# Patient Record
Sex: MALE | Race: WHITE | Employment: UNEMPLOYED | URBAN - METROPOLITAN AREA
[De-identification: names, ages, dates, MRNs, and addresses within clinical notes are randomized per-mention and may not be internally consistent; named-entity substitution may affect disease eponyms.]

---

## 2017-03-08 ENCOUNTER — HOSPITAL ENCOUNTER (EMERGENCY)
Age: 82
Discharge: HOME | End: 2017-03-08
Attending: EMERGENCY MEDICINE
Payer: MEDICARE

## 2017-03-08 ENCOUNTER — APPOINTMENT (OUTPATIENT)
Dept: GENERAL RADIOLOGY | Age: 82
End: 2017-03-08
Attending: EMERGENCY MEDICINE
Payer: MEDICARE

## 2017-03-08 VITALS
OXYGEN SATURATION: 99 % | RESPIRATION RATE: 17 BRPM | HEIGHT: 64 IN | TEMPERATURE: 97.9 F | HEART RATE: 75 BPM | DIASTOLIC BLOOD PRESSURE: 73 MMHG | WEIGHT: 145 LBS | SYSTOLIC BLOOD PRESSURE: 184 MMHG | BODY MASS INDEX: 24.75 KG/M2

## 2017-03-08 DIAGNOSIS — R53.1 WEAKNESS: Primary | ICD-10-CM

## 2017-03-08 LAB
ALBUMIN SERPL BCP-MCNC: 3.3 G/DL (ref 3.2–4.6)
ALBUMIN/GLOB SERPL: 0.8 {RATIO} (ref 1.2–3.5)
ALP SERPL-CCNC: 83 U/L (ref 50–136)
ALT SERPL-CCNC: 17 U/L (ref 12–65)
ANION GAP BLD CALC-SCNC: 10 MMOL/L (ref 7–16)
APTT PPP: 27.3 SEC (ref 23.5–31.7)
AST SERPL W P-5'-P-CCNC: 16 U/L (ref 15–37)
BASOPHILS # BLD AUTO: 0 K/UL (ref 0–0.2)
BASOPHILS # BLD: 1 % (ref 0–2)
BILIRUB SERPL-MCNC: 0.3 MG/DL (ref 0.2–1.1)
BUN SERPL-MCNC: 30 MG/DL (ref 8–23)
CALCIUM SERPL-MCNC: 8.5 MG/DL (ref 8.3–10.4)
CHLORIDE SERPL-SCNC: 105 MMOL/L (ref 98–107)
CO2 SERPL-SCNC: 27 MMOL/L (ref 21–32)
CREAT SERPL-MCNC: 1.78 MG/DL (ref 0.8–1.5)
DIFFERENTIAL METHOD BLD: ABNORMAL
EOSINOPHIL # BLD: 0.3 K/UL (ref 0–0.8)
EOSINOPHIL NFR BLD: 4 % (ref 0.5–7.8)
ERYTHROCYTE [DISTWIDTH] IN BLOOD BY AUTOMATED COUNT: 14.1 % (ref 11.9–14.6)
GLOBULIN SER CALC-MCNC: 3.9 G/DL (ref 2.3–3.5)
GLUCOSE SERPL-MCNC: 96 MG/DL (ref 65–100)
HCT VFR BLD AUTO: 40.9 % (ref 41.1–50.3)
HGB BLD-MCNC: 14.1 G/DL (ref 13.6–17.2)
IMM GRANULOCYTES # BLD: 0 K/UL (ref 0–0.5)
IMM GRANULOCYTES NFR BLD AUTO: 0.2 % (ref 0–5)
INR PPP: 1 (ref 0.9–1.2)
LYMPHOCYTES # BLD AUTO: 23 % (ref 13–44)
LYMPHOCYTES # BLD: 1.9 K/UL (ref 0.5–4.6)
MAGNESIUM SERPL-MCNC: 2 MG/DL (ref 1.8–2.4)
MCH RBC QN AUTO: 31.8 PG (ref 26.1–32.9)
MCHC RBC AUTO-ENTMCNC: 34.5 G/DL (ref 31.4–35)
MCV RBC AUTO: 92.3 FL (ref 79.6–97.8)
MONOCYTES # BLD: 0.8 K/UL (ref 0.1–1.3)
MONOCYTES NFR BLD AUTO: 9 % (ref 4–12)
NEUTS SEG # BLD: 5 K/UL (ref 1.7–8.2)
NEUTS SEG NFR BLD AUTO: 63 % (ref 43–78)
PLATELET # BLD AUTO: 172 K/UL (ref 150–450)
PMV BLD AUTO: 10.7 FL (ref 10.8–14.1)
POTASSIUM SERPL-SCNC: 3.9 MMOL/L (ref 3.5–5.1)
PROT SERPL-MCNC: 7.2 G/DL (ref 6.3–8.2)
PROTHROMBIN TIME: 10.6 SEC (ref 9.6–12)
RBC # BLD AUTO: 4.43 M/UL (ref 4.23–5.67)
SODIUM SERPL-SCNC: 142 MMOL/L (ref 136–145)
TROPONIN I SERPL-MCNC: 0.02 NG/ML (ref 0.02–0.05)
WBC # BLD AUTO: 8 K/UL (ref 4.3–11.1)

## 2017-03-08 PROCEDURE — 85025 COMPLETE CBC W/AUTO DIFF WBC: CPT | Performed by: EMERGENCY MEDICINE

## 2017-03-08 PROCEDURE — 99285 EMERGENCY DEPT VISIT HI MDM: CPT | Performed by: EMERGENCY MEDICINE

## 2017-03-08 PROCEDURE — 85610 PROTHROMBIN TIME: CPT | Performed by: EMERGENCY MEDICINE

## 2017-03-08 PROCEDURE — 85730 THROMBOPLASTIN TIME PARTIAL: CPT | Performed by: EMERGENCY MEDICINE

## 2017-03-08 PROCEDURE — 81003 URINALYSIS AUTO W/O SCOPE: CPT | Performed by: EMERGENCY MEDICINE

## 2017-03-08 PROCEDURE — 71020 XR CHEST PA LAT: CPT

## 2017-03-08 PROCEDURE — 84484 ASSAY OF TROPONIN QUANT: CPT | Performed by: EMERGENCY MEDICINE

## 2017-03-08 PROCEDURE — 93005 ELECTROCARDIOGRAM TRACING: CPT | Performed by: EMERGENCY MEDICINE

## 2017-03-08 PROCEDURE — 96360 HYDRATION IV INFUSION INIT: CPT | Performed by: EMERGENCY MEDICINE

## 2017-03-08 PROCEDURE — 82271 OCCULT BLOOD OTHER SOURCES: CPT

## 2017-03-08 PROCEDURE — 83735 ASSAY OF MAGNESIUM: CPT | Performed by: EMERGENCY MEDICINE

## 2017-03-08 PROCEDURE — 80053 COMPREHEN METABOLIC PANEL: CPT | Performed by: EMERGENCY MEDICINE

## 2017-03-08 PROCEDURE — 74011250636 HC RX REV CODE- 250/636: Performed by: EMERGENCY MEDICINE

## 2017-03-08 RX ADMIN — SODIUM CHLORIDE 500 ML: 900 INJECTION, SOLUTION INTRAVENOUS at 18:30

## 2017-03-08 NOTE — ED PROVIDER NOTES
HPI Comments: 81 yo male with a history of CAD s/p CABG, HTN, and anemia on iron presents to ED with black stool that began this morning. Patient also with over 2 weeks of generalized fatigue/weakness. He has been seen at [facility] ED and by PMD for same. Patient denies focal weakness or numbness. No headache. No dizziness. No fever/chills. No chest pain, shortness of breath, or syncope. No abdominal pain or nausea/vomiting. Patient is a 80 y.o. male presenting with anal bleeding. The history is provided by the patient and a relative. Rectal Bleeding    Pertinent negatives include no abdominal pain, no dysuria, no chills, no fever, no nausea, no vomiting and no diarrhea. Past Medical History:   Diagnosis Date    CAD (coronary artery disease)        Past Surgical History:   Procedure Laterality Date    CARDIAC SURG PROCEDURE UNLIST           History reviewed. No pertinent family history. Social History     Social History    Marital status:      Spouse name: N/A    Number of children: N/A    Years of education: N/A     Occupational History    Not on file. Social History Main Topics    Smoking status: Current Every Day Smoker    Smokeless tobacco: Not on file    Alcohol use Not on file    Drug use: Not on file    Sexual activity: Not on file     Other Topics Concern    Not on file     Social History Narrative    No narrative on file         ALLERGIES: Review of patient's allergies indicates no known allergies. Review of Systems   Constitutional: Positive for fatigue. Negative for chills and fever. HENT: Negative for congestion, rhinorrhea and sore throat. Eyes: Negative for pain and discharge. Respiratory: Negative for chest tightness and shortness of breath. Cardiovascular: Negative for chest pain, palpitations and leg swelling. Gastrointestinal: Negative for abdominal pain, diarrhea, nausea and vomiting.    Endocrine: Negative for cold intolerance and heat intolerance. Genitourinary: Negative for dysuria, flank pain and hematuria. Musculoskeletal: Negative for arthralgias, joint swelling and neck stiffness. Skin: Negative for pallor and wound. Allergic/Immunologic: Negative for environmental allergies. Neurological: Positive for weakness. Negative for dizziness, syncope, speech difficulty, light-headedness and numbness. Hematological: Negative for adenopathy. Psychiatric/Behavioral: Negative for suicidal ideas. Vitals:    03/08/17 1715 03/08/17 1812   BP: 155/64 137/63   Pulse: 77    Resp: 18    Temp: 98 °F (36.7 °C)    SpO2: 96%    Weight: 65.8 kg (145 lb)    Height: 5' 4\" (1.626 m)             Physical Exam   Constitutional: He is oriented to person, place, and time. He appears well-developed and well-nourished. No distress. HENT:   Head: Normocephalic and atraumatic. Mouth/Throat: Oropharynx is clear and moist.   Eyes: Conjunctivae and EOM are normal.   Neck: Normal range of motion. Neck supple. Cardiovascular: Normal rate, regular rhythm and intact distal pulses. Pulmonary/Chest: Effort normal and breath sounds normal. No respiratory distress. Abdominal: Soft. He exhibits no distension. There is no tenderness. Genitourinary:   Genitourinary Comments: Dark stool, hemoccult negative   Musculoskeletal: Normal range of motion. He exhibits no edema or tenderness. Neurological: He is alert and oriented to person, place, and time. Skin: Skin is warm and dry. Psychiatric: He has a normal mood and affect. MDM  Number of Diagnoses or Management Options  Diagnosis management comments: CXR with no acute disease. Labs reviewed, creatinine of 1.78, no comparison, patient given IVF here in ED, otherwise labs unremarkable. Patient with ~ 2 weeks of generalized weakness, normal exam here in ED, no signs of GI bleeding, will d/c home, PMD follow up.     ED Course       Procedures

## 2017-03-08 NOTE — ED TRIAGE NOTES
Reports black stools since this AM. Is not on a blood thinner. Denies pain. Reports 2 weeks ago was at ED for shaking. States feeling weak and has had some confusion.

## 2017-03-09 LAB
ATRIAL RATE: 75 BPM
CALCULATED P AXIS, ECG09: 56 DEGREES
CALCULATED R AXIS, ECG10: 11 DEGREES
CALCULATED T AXIS, ECG11: 8 DEGREES
DIAGNOSIS, 93000: NORMAL
DIASTOLIC BP, ECG02: NORMAL MMHG
P-R INTERVAL, ECG05: 182 MS
Q-T INTERVAL, ECG07: 396 MS
QRS DURATION, ECG06: 82 MS
QTC CALCULATION (BEZET), ECG08: 442 MS
SYSTOLIC BP, ECG01: NORMAL MMHG
VENTRICULAR RATE, ECG03: 75 BPM

## 2017-03-09 NOTE — ED NOTES
I have reviewed discharge instructions with the patient. The patient verbalized understanding. Ambulatory with family at bedside.

## 2017-03-09 NOTE — DISCHARGE INSTRUCTIONS
Weakness: Care Instructions  Your Care Instructions  Weakness is a lack of physical or muscle strength. You may feel that you need to make extra effort to move your arms, legs, or other muscles. Generalized weakness means that you feel weak in most areas of your body. Another type of weakness may affect just one muscle or group of muscles. You may feel weak and tired after you have done too much activity, such as taking an extra-long hike. This is not a serious problem. It often goes away on its own. Feeling weak can also be caused by medical conditions like thyroid problems, depression, or a virus. Sometimes the cause can be serious. Your doctor may want to do more tests to try to find the cause of the weakness. The doctor has checked you carefully, but problems can develop later. If you notice any problems or new symptoms, get medical treatment right away. Follow-up care is a key part of your treatment and safety. Be sure to make and go to all appointments, and call your doctor if you are having problems. It's also a good idea to know your test results and keep a list of the medicines you take. How can you care for yourself at home? · Rest when you feel tired. · Be safe with medicines. If your doctor prescribed medicine, take it exactly as prescribed. Call your doctor if you think you are having a problem with your medicine. You will get more details on the specific medicines your doctor prescribes. · Do not skip meals. Eating a balanced diet may increase your energy level. · Get some physical activity every day, but do not get too tired. When should you call for help? Call your doctor now or seek immediate medical care if:  · You have new or worse weakness. · You are dizzy or lightheaded, or you feel like you may faint. Watch closely for changes in your health, and be sure to contact your doctor if:  · You do not get better as expected. Where can you learn more?   Go to http://phil.info/. Enter [id number] in the search box to learn more about \"Weakness: Care Instructions. \"  Current as of: May 27, 2016  Content Version: 11.1  © 7194-5640 [employer]. Care instructions adapted under license by [employer] (which disclaims liability or warranty for this information). If you have questions about a medical condition or this instruction, always ask your healthcare professional. [address] any warranty or liability for your use of this information.

## 2017-06-12 ENCOUNTER — APPOINTMENT (OUTPATIENT)
Dept: CT IMAGING | Age: 82
End: 2017-06-12
Attending: EMERGENCY MEDICINE
Payer: MEDICARE

## 2017-06-12 ENCOUNTER — APPOINTMENT (OUTPATIENT)
Dept: GENERAL RADIOLOGY | Age: 82
End: 2017-06-12
Attending: EMERGENCY MEDICINE
Payer: MEDICARE

## 2017-06-12 ENCOUNTER — HOSPITAL ENCOUNTER (EMERGENCY)
Age: 82
Discharge: HOME | End: 2017-06-12
Attending: EMERGENCY MEDICINE
Payer: MEDICARE

## 2017-06-12 VITALS
HEART RATE: 76 BPM | BODY MASS INDEX: 24.75 KG/M2 | TEMPERATURE: 98.1 F | OXYGEN SATURATION: 95 % | WEIGHT: 145 LBS | RESPIRATION RATE: 18 BRPM | SYSTOLIC BLOOD PRESSURE: 120 MMHG | HEIGHT: 64 IN | DIASTOLIC BLOOD PRESSURE: 50 MMHG

## 2017-06-12 DIAGNOSIS — F03.90 DEMENTIA WITHOUT BEHAVIORAL DISTURBANCE, UNSPECIFIED DEMENTIA TYPE: ICD-10-CM

## 2017-06-12 DIAGNOSIS — R60.0 BILATERAL EDEMA OF LOWER EXTREMITY: Primary | ICD-10-CM

## 2017-06-12 LAB
ALBUMIN SERPL BCP-MCNC: 3.3 G/DL (ref 3.2–4.6)
ALBUMIN/GLOB SERPL: 0.9 {RATIO} (ref 1.2–3.5)
ALP SERPL-CCNC: 67 U/L (ref 50–136)
ALT SERPL-CCNC: 20 U/L (ref 12–65)
ANION GAP BLD CALC-SCNC: 4 MMOL/L (ref 7–16)
AST SERPL W P-5'-P-CCNC: 17 U/L (ref 15–37)
BASOPHILS # BLD AUTO: 0 K/UL (ref 0–0.2)
BASOPHILS # BLD: 0 % (ref 0–2)
BILIRUB SERPL-MCNC: 0.4 MG/DL (ref 0.2–1.1)
BNP SERPL-MCNC: 132 PG/ML
BUN SERPL-MCNC: 18 MG/DL (ref 8–23)
CALCIUM SERPL-MCNC: 9 MG/DL (ref 8.3–10.4)
CHLORIDE SERPL-SCNC: 104 MMOL/L (ref 98–107)
CO2 SERPL-SCNC: 31 MMOL/L (ref 21–32)
CREAT SERPL-MCNC: 1.63 MG/DL (ref 0.8–1.5)
DIFFERENTIAL METHOD BLD: ABNORMAL
EOSINOPHIL # BLD: 0.3 K/UL (ref 0–0.8)
EOSINOPHIL NFR BLD: 3 % (ref 0.5–7.8)
ERYTHROCYTE [DISTWIDTH] IN BLOOD BY AUTOMATED COUNT: 14.1 % (ref 11.9–14.6)
GLOBULIN SER CALC-MCNC: 3.8 G/DL (ref 2.3–3.5)
GLUCOSE SERPL-MCNC: 92 MG/DL (ref 65–100)
HCT VFR BLD AUTO: 36.3 % (ref 41.1–50.3)
HGB BLD-MCNC: 12.1 G/DL (ref 13.6–17.2)
IMM GRANULOCYTES # BLD: 0 K/UL (ref 0–0.5)
IMM GRANULOCYTES NFR BLD AUTO: 0.1 % (ref 0–5)
LYMPHOCYTES # BLD AUTO: 25 % (ref 13–44)
LYMPHOCYTES # BLD: 1.9 K/UL (ref 0.5–4.6)
MCH RBC QN AUTO: 31.3 PG (ref 26.1–32.9)
MCHC RBC AUTO-ENTMCNC: 33.3 G/DL (ref 31.4–35)
MCV RBC AUTO: 94 FL (ref 79.6–97.8)
MONOCYTES # BLD: 0.6 K/UL (ref 0.1–1.3)
MONOCYTES NFR BLD AUTO: 8 % (ref 4–12)
NEUTS SEG # BLD: 5 K/UL (ref 1.7–8.2)
NEUTS SEG NFR BLD AUTO: 64 % (ref 43–78)
PLATELET # BLD AUTO: 155 K/UL (ref 150–450)
PMV BLD AUTO: 11.5 FL (ref 10.8–14.1)
POTASSIUM SERPL-SCNC: 4 MMOL/L (ref 3.5–5.1)
PROT SERPL-MCNC: 7.1 G/DL (ref 6.3–8.2)
RBC # BLD AUTO: 3.86 M/UL (ref 4.23–5.67)
SODIUM SERPL-SCNC: 139 MMOL/L (ref 136–145)
TROPONIN I SERPL-MCNC: <0.04 NG/ML (ref 0.02–0.05)
TSH SERPL DL<=0.005 MIU/L-ACNC: 1.88 UIU/ML (ref 0.36–3.74)
WBC # BLD AUTO: 7.8 K/UL (ref 4.3–11.1)

## 2017-06-12 PROCEDURE — 93005 ELECTROCARDIOGRAM TRACING: CPT | Performed by: EMERGENCY MEDICINE

## 2017-06-12 PROCEDURE — 71020 XR CHEST PA LAT: CPT

## 2017-06-12 PROCEDURE — 84484 ASSAY OF TROPONIN QUANT: CPT | Performed by: EMERGENCY MEDICINE

## 2017-06-12 PROCEDURE — 84443 ASSAY THYROID STIM HORMONE: CPT | Performed by: EMERGENCY MEDICINE

## 2017-06-12 PROCEDURE — 99285 EMERGENCY DEPT VISIT HI MDM: CPT | Performed by: EMERGENCY MEDICINE

## 2017-06-12 PROCEDURE — 85025 COMPLETE CBC W/AUTO DIFF WBC: CPT | Performed by: EMERGENCY MEDICINE

## 2017-06-12 PROCEDURE — 70450 CT HEAD/BRAIN W/O DYE: CPT

## 2017-06-12 PROCEDURE — 80053 COMPREHEN METABOLIC PANEL: CPT | Performed by: EMERGENCY MEDICINE

## 2017-06-12 PROCEDURE — 81003 URINALYSIS AUTO W/O SCOPE: CPT | Performed by: EMERGENCY MEDICINE

## 2017-06-12 PROCEDURE — 83880 ASSAY OF NATRIURETIC PEPTIDE: CPT | Performed by: EMERGENCY MEDICINE

## 2017-06-12 RX ORDER — DOCUSATE SODIUM 100 MG/1
100 CAPSULE, LIQUID FILLED ORAL 2 TIMES DAILY
COMMUNITY

## 2017-06-12 RX ORDER — LANOLIN ALCOHOL/MO/W.PET/CERES
325 CREAM (GRAM) TOPICAL
COMMUNITY

## 2017-06-12 RX ORDER — DICLOFENAC SODIUM 10 MG/G
GEL TOPICAL 4 TIMES DAILY
COMMUNITY

## 2017-06-12 RX ORDER — OMEPRAZOLE 20 MG/1
20 CAPSULE, DELAYED RELEASE ORAL DAILY
COMMUNITY

## 2017-06-12 RX ORDER — LISINOPRIL 40 MG/1
40 TABLET ORAL DAILY
COMMUNITY
End: 2017-08-22

## 2017-06-12 RX ORDER — LEVOTHYROXINE SODIUM 100 UG/1
100 TABLET ORAL
COMMUNITY

## 2017-06-12 RX ORDER — MELOXICAM 7.5 MG/1
7.5 TABLET ORAL DAILY
COMMUNITY
End: 2017-08-22

## 2017-06-12 RX ORDER — NICOTINE 7MG/24HR
1 PATCH, TRANSDERMAL 24 HOURS TRANSDERMAL EVERY 24 HOURS
COMMUNITY

## 2017-06-12 RX ORDER — HYDROCHLOROTHIAZIDE 25 MG/1
25 TABLET ORAL DAILY
COMMUNITY
End: 2017-08-22

## 2017-06-12 RX ORDER — BACLOFEN 10 MG/1
10 TABLET ORAL 2 TIMES DAILY
COMMUNITY

## 2017-06-12 RX ORDER — FINASTERIDE 5 MG/1
5 TABLET, FILM COATED ORAL DAILY
COMMUNITY

## 2017-06-12 NOTE — ED TRIAGE NOTES
Pt in with family. Per family pt with increased confusion over past 2 weeks. Pt with fall yesterday. States pt in incontinent of stool and urine. States was told to come to ed by nurse at [place]. Pt denies pain. Pt alert to place and time on triage. Pt states he was out of lasix until yesterday and has increased swelling in legs. Denies sob or cp.

## 2017-06-13 LAB
ATRIAL RATE: 60 BPM
CALCULATED P AXIS, ECG09: 15 DEGREES
CALCULATED R AXIS, ECG10: 2 DEGREES
CALCULATED T AXIS, ECG11: 19 DEGREES
DIAGNOSIS, 93000: NORMAL
P-R INTERVAL, ECG05: 174 MS
Q-T INTERVAL, ECG07: 424 MS
QRS DURATION, ECG06: 72 MS
QTC CALCULATION (BEZET), ECG08: 424 MS
VENTRICULAR RATE, ECG03: 60 BPM

## 2017-06-13 NOTE — DISCHARGE INSTRUCTIONS
Continue current medications. Call to speak with , Ami Faustin at [phone] after 9 AM tomorrow. Leg and Ankle Edema: Care Instructions  Your Care Instructions  Swelling in the legs, ankles, and feet is called edema. It is common after you sit or stand for a while. Long plane flights or car rides often cause swelling in the legs and feet. You may also have swelling if you have to stand for long periods of time at your job. Problems with the veins in the legs (varicose veins) and changes in hormones can also cause swelling. Sometimes the swelling in the ankles and feet is caused by a more serious problem, such as heart failure, infection, blood clots, or liver or kidney disease. Follow-up care is a key part of your treatment and safety. Be sure to make and go to all appointments, and call your doctor if you are having problems. Its also a good idea to know your test results and keep a list of the medicines you take. How can you care for yourself at home? · If your doctor gave you medicine, take it as prescribed. Call your doctor if you think you are having a problem with your medicine. · Whenever you are resting, raise your legs up. Try to keep the swollen area higher than the level of your heart. · Take breaks from standing or sitting in one position. ¨ Walk around to increase the blood flow in your lower legs. ¨ Move your feet and ankles often while you stand, or tighten and relax your leg muscles. · Wear support stockings. Put them on in the morning, before swelling gets worse. · Eat a balanced diet. Lose weight if you need to. · Limit the amount of salt (sodium) in your diet. Salt holds fluid in the body and may increase swelling. When should you call for help? Call 911 anytime you think you may need emergency care. For example, call if:  · You have symptoms of a blood clot in your lung (called a pulmonary embolism). These may include:  ¨ Sudden chest pain.   ¨ Trouble breathing. ¨ Coughing up blood. Call your doctor now or seek immediate medical care if:  · You have signs of a blood clot, such as:  ¨ Pain in your calf, back of the knee, thigh, or groin. ¨ Redness and swelling in your leg or groin. · You have symptoms of infection, such as:  ¨ Increased pain, swelling, warmth, or redness. ¨ Red streaks or pus. ¨ A fever. Watch closely for changes in your health, and be sure to contact your doctor if:  · Your swelling is getting worse. · You have new or worsening pain in your legs. · You do not get better as expected. Where can you learn more? Go to http://kevan-gabriel.info/. Enter I431 in the search box to learn more about \"Leg and Ankle Edema: Care Instructions. \"  Current as of: May 27, 2016  Content Version: 11.2  © 1473-4765 [employer]. Care instructions adapted under license by [employer] (which disclaims liability or warranty for this information). If you have questions about a medical condition or this instruction, always ask your healthcare professional. [place] [address] any warranty or liability for your use of this information.

## 2017-06-13 NOTE — PROGRESS NOTES
Referral from Dr Zhou Crawford to follow up with son regarding plan for patient when he goes to snf or rehab (?). Call to son and left message on voicemail.

## 2017-06-13 NOTE — ED PROVIDER NOTES
HPI Comments: 17-year-old gentleman lives with a son. Uses a walker granulate. Has a history of hypertension coronary artery disease and bypass grafting. Also history of I believe congestive heart failure. That out of his hydrochlorothiazide and lisinopril for the last 3 weeks. Seen increasing swelling in his legs. No chest pain or shortness of breath. Does have to seem to have confusion and some weakness that is worse over the last couple days as well. No fever no vomiting no focal numbness or weakness. Patient is a 80 y.o. male presenting with lower extremity edema. The history is provided by the patient. Leg Swelling    This is a new problem. The current episode started more than 1 week ago. The problem has been gradually worsening. The pain is mild. Pertinent negatives include no numbness, no back pain and no neck pain. There has been no history of extremity trauma. Past Medical History:   Diagnosis Date    CAD (coronary artery disease)        Past Surgical History:   Procedure Laterality Date    CARDIAC SURG PROCEDURE UNLIST           History reviewed. No pertinent family history. Social History     Social History    Marital status:      Spouse name: N/A    Number of children: N/A    Years of education: N/A     Occupational History    Not on file. Social History Main Topics    Smoking status: Current Every Day Smoker    Smokeless tobacco: Not on file    Alcohol use Not on file    Drug use: Not on file    Sexual activity: Not on file     Other Topics Concern    Not on file     Social History Narrative         ALLERGIES: Review of patient's allergies indicates no known allergies. Review of Systems   Constitutional: Negative for chills and fever. HENT: Negative for ear pain. Respiratory: Negative for cough and shortness of breath. Cardiovascular: Positive for leg swelling. Negative for chest pain and palpitations.    Gastrointestinal: Negative for abdominal pain, diarrhea and vomiting. Genitourinary: Negative for dysuria and flank pain. Musculoskeletal: Negative for back pain and neck pain. Skin: Negative for color change and rash. Neurological: Negative for syncope, weakness, numbness and headaches. All other systems reviewed and are negative. Vitals:    06/12/17 1710   BP: 122/46   Pulse: 75   Resp: 19   Temp: 98.1 °F (36.7 °C)   SpO2: 95%   Weight: 65.8 kg (145 lb)   Height: 5' 4\" (1.626 m)            Physical Exam   Constitutional: He appears well-developed and well-nourished. No distress. HENT:   Head: Normocephalic and atraumatic. Mouth/Throat: Oropharynx is clear and moist. No oropharyngeal exudate. Eyes: Conjunctivae and EOM are normal. Pupils are equal, round, and reactive to light. Neck: Normal range of motion. Neck supple. Cardiovascular: Normal rate, regular rhythm and intact distal pulses. No murmur heard. Pulmonary/Chest: Breath sounds normal. No respiratory distress. Abdominal: Soft. Bowel sounds are normal. He exhibits no mass. There is no tenderness. There is no rebound and no guarding. No hernia. Musculoskeletal: He exhibits edema (1-2+ lower extremity pitting bilateral). Neurological: He is alert. He has normal strength. He is disoriented. Gait normal. GCS eye subscore is 4. GCS verbal subscore is 4. GCS motor subscore is 5. Nl speech  No drift. Normal finger-nose testing. Skin: Skin is warm and dry. Psychiatric: He has a normal mood and affect. His speech is normal.   Nursing note and vitals reviewed. MDM  Number of Diagnoses or Management Options  Diagnosis management comments: Patient did have a fall last night, we'll obtain head imaging to rule out stroke or bleed. Assessment dehydration, electrolyte abnormality, infection or congestive heart failure.        Amount and/or Complexity of Data Reviewed  Clinical lab tests: ordered and reviewed  Tests in the radiology section of CPT®: ordered and reviewed  Tests in the medicine section of CPT®: ordered and reviewed  Independent visualization of images, tracings, or specimens: yes    Risk of Complications, Morbidity, and/or Mortality  Presenting problems: moderate  Diagnostic procedures: low  Management options: moderate      ED Course       Procedures    Results Include:    Recent Results (from the past 24 hour(s))   CBC WITH AUTOMATED DIFF    Collection Time: 06/12/17  6:16 PM   Result Value Ref Range    WBC 7.8 4.3 - 11.1 K/uL    RBC 3.86 (L) 4.23 - 5.67 M/uL    HGB 12.1 (L) 13.6 - 17.2 g/dL    HCT 36.3 (L) 41.1 - 50.3 %    MCV 94.0 79.6 - 97.8 FL    MCH 31.3 26.1 - 32.9 PG    MCHC 33.3 31.4 - 35.0 g/dL    RDW 14.1 11.9 - 14.6 %    PLATELET 352 319 - 159 K/uL    MPV 11.5 10.8 - 14.1 FL    DF AUTOMATED      NEUTROPHILS 64 43 - 78 %    LYMPHOCYTES 25 13 - 44 %    MONOCYTES 8 4.0 - 12.0 %    EOSINOPHILS 3 0.5 - 7.8 %    BASOPHILS 0 0.0 - 2.0 %    IMMATURE GRANULOCYTES 0.1 0.0 - 5.0 %    ABS. NEUTROPHILS 5.0 1.7 - 8.2 K/UL    ABS. LYMPHOCYTES 1.9 0.5 - 4.6 K/UL    ABS. MONOCYTES 0.6 0.1 - 1.3 K/UL    ABS. EOSINOPHILS 0.3 0.0 - 0.8 K/UL    ABS. BASOPHILS 0.0 0.0 - 0.2 K/UL    ABS. IMM. GRANS. 0.0 0.0 - 0.5 K/UL   METABOLIC PANEL, COMPREHENSIVE    Collection Time: 06/12/17  6:16 PM   Result Value Ref Range    Sodium 139 136 - 145 mmol/L    Potassium 4.0 3.5 - 5.1 mmol/L    Chloride 104 98 - 107 mmol/L    CO2 31 21 - 32 mmol/L    Anion gap 4 (L) 7 - 16 mmol/L    Glucose 92 65 - 100 mg/dL    BUN 18 8 - 23 MG/DL    Creatinine 1.63 (H) 0.8 - 1.5 MG/DL    GFR est AA 52 (L) >60 ml/min/1.73m2    GFR est non-AA 43 (L) >60 ml/min/1.73m2    Calcium 9.0 8.3 - 10.4 MG/DL    Bilirubin, total 0.4 0.2 - 1.1 MG/DL    ALT (SGPT) 20 12 - 65 U/L    AST (SGOT) 17 15 - 37 U/L    Alk.  phosphatase 67 50 - 136 U/L    Protein, total 7.1 6.3 - 8.2 g/dL    Albumin 3.3 3.2 - 4.6 g/dL    Globulin 3.8 (H) 2.3 - 3.5 g/dL    A-G Ratio 0.9 (L) 1.2 - 3.5     TROPONIN I    Collection Time: 06/12/17  6:16 PM   Result Value Ref Range    Troponin-I, Qt. <0.04 0.02 - 0.05 NG/ML   BNP    Collection Time: 06/12/17  6:16 PM   Result Value Ref Range     pg/mL   TSH 3RD GENERATION    Collection Time: 06/12/17  6:16 PM   Result Value Ref Range    TSH 1.876 0.358 - 3.740 uIU/mL   EKG, 12 LEAD, INITIAL    Collection Time: 06/12/17  8:34 PM   Result Value Ref Range    Ventricular Rate 60 BPM    Atrial Rate 60 BPM    P-R Interval 174 ms    QRS Duration 72 ms    Q-T Interval 424 ms    QTC Calculation (Bezet) 424 ms    Calculated P Axis 15 degrees    Calculated R Axis 2 degrees    Calculated T Axis 19 degrees    Diagnosis       Sinus rhythm with occasional Premature ventricular complexes and Premature   atrial complexes  Anterior infarct , age undetermined  Abnormal ECG  When compared with ECG of 12-JUN-2017 20:34,  Previous ECG has undetermined rhythm, needs review       Xr Chest Pa Lat    Result Date: 6/12/2017  Frontal and lateral views of the chest COMPARISON: March 8, 2017 INDICATION: Chest pain FINDINGS: There are stable midline sternotomy wires. Left shoulder prosthesis is partially visualized. There are degenerative changes of the spine. Lungs are underinflated. No focal pulmonary consolidation, pleural effusion or pneumothorax. No pulmonary edema. Cardiac mediastinal contour appears within normal limits. IMPRESSION: No pulmonary consolidation. No significant change. Ct Head Wo Cont    Result Date: 6/12/2017  Noncontrast CT of the brain. COMPARISON: None. INDICATION: Fall. Trauma. Altered mental status TECHNIQUE: Contiguous axial images were obtained from the skull base through the vertex without IV contrast. Radiation dose reduction techniques were used for this study:  Our CT scanners use one or all of the following: Automated exposure control, adjustment of the mA and/or kVp according to patient's size, iterative reconstruction.  FINDINGS: There is no acute intracranial hemorrhage or evidence for acute territorial infarction. There is no mass effect, midline shift or hydrocephalus. There is no extra-axial fluid collection. The cerebellum and brainstem are grossly unremarkable. Periventricular diffuse hypodensities are nonspecific and likely secondary to chronic small vessel disease. There is generalized cerebral volume loss, age-related. The visualized orbits and the globes are intact. There is mild mucosal thickening of the ethmoid air cells. Mastoid air cells are aerated. Surrounding bones are intact. IMPRESSION: No acute intracranial abnormality. No sign of any other problems requiring admission to the hospital.  Patient's son takes care of him but he evidently is leaving to either go to long term or rehabilitation in the next 45 days and the patient may not have anyone to take care of him. We'll put him for social service and case management to contact family to see if any of the arrangements regarding assisted living can be made. Suspect patient's edema will be taking care of by the medications he is now restarted.

## 2017-06-14 NOTE — PROGRESS NOTES
Son's significant other called back. States patient lives at home with his son but his son is 'in a lot of trouble' and is 'probably going to care home'. States he has a court date coming up so they are not sure yet States patient needs assistance with ADLs and cannot live on his own. States son Mary Mclain is his stepson and he has no one else. States she has been in [facility] the home to assess him today. Vicki Louie is also looking into [facility] in [facility] and sent her paperwork yesterday to fill out for the [facility] nursing home in [facility]. States she would rather he not go to [facility] as it is a long drive for her to visit. Offered to find a nursing home closer to her but she states they don't have the money for it. Understands that the [facility] may be their only option. States she is 'tired' and can't keep doing all this. Notified I would call Ricardo Hannah at the [facility] and discuss with her. Call to Rudy Bowman and left a message.

## 2017-06-14 NOTE — PROGRESS NOTES
Call received from Haydee MELENDEZ at the [place]. [facility] patient is on her 'concerned veterans' list and is a high priority. [facility] she has already set up home health aides and a safe place at adult  until he gets into [facility] in [facility]. [facility] there is a 3-4 mth wait there but she is following him closely to make sure he is taken care of in the meantime. Denies need for assistance.

## 2017-06-14 NOTE — PROGRESS NOTES
No call from son. Called again ([phone]) and left another message. Also called son's wife [facility] ([phone]) and left a message with her as well.

## 2017-08-01 ENCOUNTER — HOSPITAL ENCOUNTER (INPATIENT)
Age: 82
LOS: 21 days | Discharge: NURSING, REHAB OR LONG-TERM CARE | DRG: 853 | End: 2017-08-22
Attending: HOSPITALIST | Admitting: HOSPITALIST
Payer: MEDICARE

## 2017-08-01 PROBLEM — N39.0 UTI (URINARY TRACT INFECTION): Status: ACTIVE | Noted: 2017-08-01

## 2017-08-01 PROBLEM — R50.9 FEVER: Status: ACTIVE | Noted: 2017-08-01

## 2017-08-01 PROBLEM — R41.82 ALTERED MENTAL STATUS: Status: ACTIVE | Noted: 2017-08-01

## 2017-08-01 PROBLEM — J18.9 CAP (COMMUNITY ACQUIRED PNEUMONIA): Status: ACTIVE | Noted: 2017-08-01

## 2017-08-01 LAB
ANION GAP BLD CALC-SCNC: 7 MMOL/L (ref 7–16)
BASOPHILS # BLD AUTO: 0 K/UL (ref 0–0.2)
BASOPHILS # BLD: 0 % (ref 0–2)
BUN SERPL-MCNC: 28 MG/DL (ref 8–23)
CALCIUM SERPL-MCNC: 7.8 MG/DL (ref 8.3–10.4)
CHLORIDE SERPL-SCNC: 107 MMOL/L (ref 98–107)
CO2 SERPL-SCNC: 26 MMOL/L (ref 21–32)
CREAT SERPL-MCNC: 1.78 MG/DL (ref 0.8–1.5)
CRP SERPL-MCNC: 9.2 MG/DL (ref 0–0.9)
DIFFERENTIAL METHOD BLD: ABNORMAL
EOSINOPHIL # BLD: 0 K/UL (ref 0–0.8)
EOSINOPHIL NFR BLD: 0 % (ref 0.5–7.8)
ERYTHROCYTE [DISTWIDTH] IN BLOOD BY AUTOMATED COUNT: 14 % (ref 11.9–14.6)
FOLATE SERPL-MCNC: 13.7 NG/ML (ref 3.1–17.5)
GLUCOSE SERPL-MCNC: 118 MG/DL (ref 65–100)
HCT VFR BLD AUTO: 30.8 % (ref 41.1–50.3)
HGB BLD-MCNC: 10.6 G/DL (ref 13.6–17.2)
IMM GRANULOCYTES # BLD: 0 K/UL (ref 0–0.5)
IMM GRANULOCYTES NFR BLD AUTO: 0.3 % (ref 0–5)
LYMPHOCYTES # BLD AUTO: 10 % (ref 13–44)
LYMPHOCYTES # BLD: 1.1 K/UL (ref 0.5–4.6)
MAGNESIUM SERPL-MCNC: 1.9 MG/DL (ref 1.8–2.4)
MCH RBC QN AUTO: 31.4 PG (ref 26.1–32.9)
MCHC RBC AUTO-ENTMCNC: 34.4 G/DL (ref 31.4–35)
MCV RBC AUTO: 91.1 FL (ref 79.6–97.8)
MM INDURATION POC: NORMAL (ref 0–5)
MONOCYTES # BLD: 1.2 K/UL (ref 0.1–1.3)
MONOCYTES NFR BLD AUTO: 11 % (ref 4–12)
NEUTS SEG # BLD: 9 K/UL (ref 1.7–8.2)
NEUTS SEG NFR BLD AUTO: 79 % (ref 43–78)
PLATELET # BLD AUTO: 116 K/UL (ref 150–450)
PMV BLD AUTO: 11 FL (ref 10.8–14.1)
POTASSIUM SERPL-SCNC: 3.6 MMOL/L (ref 3.5–5.1)
PPD POC: NORMAL NEGATIVE
RBC # BLD AUTO: 3.38 M/UL (ref 4.23–5.67)
SODIUM SERPL-SCNC: 140 MMOL/L (ref 136–145)
TSH SERPL DL<=0.005 MIU/L-ACNC: 0.38 UIU/ML (ref 0.36–3.74)
VIT B12 SERPL-MCNC: 225 PG/ML (ref 193–986)
WBC # BLD AUTO: 11.4 K/UL (ref 4.3–11.1)

## 2017-08-01 PROCEDURE — 74011250636 HC RX REV CODE- 250/636: Performed by: HOSPITALIST

## 2017-08-01 PROCEDURE — 94760 N-INVAS EAR/PLS OXIMETRY 1: CPT

## 2017-08-01 PROCEDURE — 86580 TB INTRADERMAL TEST: CPT | Performed by: HOSPITALIST

## 2017-08-01 PROCEDURE — 82607 VITAMIN B-12: CPT | Performed by: HOSPITALIST

## 2017-08-01 PROCEDURE — 74011000302 HC RX REV CODE- 302: Performed by: HOSPITALIST

## 2017-08-01 PROCEDURE — 80048 BASIC METABOLIC PNL TOTAL CA: CPT | Performed by: HOSPITALIST

## 2017-08-01 PROCEDURE — 99407 BEHAV CHNG SMOKING > 10 MIN: CPT

## 2017-08-01 PROCEDURE — 65270000029 HC RM PRIVATE

## 2017-08-01 PROCEDURE — 84443 ASSAY THYROID STIM HORMONE: CPT | Performed by: HOSPITALIST

## 2017-08-01 PROCEDURE — 97162 PT EVAL MOD COMPLEX 30 MIN: CPT

## 2017-08-01 PROCEDURE — 85025 COMPLETE CBC W/AUTO DIFF WBC: CPT | Performed by: NURSE PRACTITIONER

## 2017-08-01 PROCEDURE — 77010033678 HC OXYGEN DAILY

## 2017-08-01 PROCEDURE — 82746 ASSAY OF FOLIC ACID SERUM: CPT | Performed by: HOSPITALIST

## 2017-08-01 PROCEDURE — 74011000258 HC RX REV CODE- 258: Performed by: HOSPITALIST

## 2017-08-01 PROCEDURE — 36415 COLL VENOUS BLD VENIPUNCTURE: CPT | Performed by: HOSPITALIST

## 2017-08-01 PROCEDURE — 83735 ASSAY OF MAGNESIUM: CPT | Performed by: HOSPITALIST

## 2017-08-01 PROCEDURE — 74011250637 HC RX REV CODE- 250/637: Performed by: HOSPITALIST

## 2017-08-01 PROCEDURE — 86140 C-REACTIVE PROTEIN: CPT | Performed by: HOSPITALIST

## 2017-08-01 RX ORDER — SODIUM CHLORIDE 0.9 % (FLUSH) 0.9 %
5-10 SYRINGE (ML) INJECTION AS NEEDED
Status: DISCONTINUED | OUTPATIENT
Start: 2017-08-01 | End: 2017-08-22 | Stop reason: HOSPADM

## 2017-08-01 RX ORDER — PANTOPRAZOLE SODIUM 40 MG/1
40 TABLET, DELAYED RELEASE ORAL
Status: DISCONTINUED | OUTPATIENT
Start: 2017-08-01 | End: 2017-08-22 | Stop reason: HOSPADM

## 2017-08-01 RX ORDER — NICOTINE 7MG/24HR
1 PATCH, TRANSDERMAL 24 HOURS TRANSDERMAL EVERY 24 HOURS
Status: DISCONTINUED | OUTPATIENT
Start: 2017-08-01 | End: 2017-08-22 | Stop reason: HOSPADM

## 2017-08-01 RX ORDER — ASPIRIN 81 MG/1
81 TABLET ORAL DAILY
COMMUNITY

## 2017-08-01 RX ORDER — ONDANSETRON 2 MG/ML
4 INJECTION INTRAMUSCULAR; INTRAVENOUS
Status: DISCONTINUED | OUTPATIENT
Start: 2017-08-01 | End: 2017-08-22 | Stop reason: HOSPADM

## 2017-08-01 RX ORDER — ENOXAPARIN SODIUM 100 MG/ML
40 INJECTION SUBCUTANEOUS EVERY 24 HOURS
Status: DISCONTINUED | OUTPATIENT
Start: 2017-08-01 | End: 2017-08-01

## 2017-08-01 RX ORDER — SODIUM CHLORIDE 9 MG/ML
125 INJECTION, SOLUTION INTRAVENOUS CONTINUOUS
Status: DISCONTINUED | OUTPATIENT
Start: 2017-08-01 | End: 2017-08-05

## 2017-08-01 RX ORDER — LEVOTHYROXINE SODIUM 100 UG/1
100 TABLET ORAL
Status: DISCONTINUED | OUTPATIENT
Start: 2017-08-01 | End: 2017-08-22 | Stop reason: HOSPADM

## 2017-08-01 RX ORDER — ALBUTEROL SULFATE 0.83 MG/ML
2.5 SOLUTION RESPIRATORY (INHALATION)
Status: DISCONTINUED | OUTPATIENT
Start: 2017-08-01 | End: 2017-08-03

## 2017-08-01 RX ORDER — LANOLIN ALCOHOL/MO/W.PET/CERES
325 CREAM (GRAM) TOPICAL
Status: DISCONTINUED | OUTPATIENT
Start: 2017-08-01 | End: 2017-08-22 | Stop reason: HOSPADM

## 2017-08-01 RX ORDER — ACETAMINOPHEN 325 MG/1
650 TABLET ORAL
Status: DISCONTINUED | OUTPATIENT
Start: 2017-08-01 | End: 2017-08-22 | Stop reason: HOSPADM

## 2017-08-01 RX ORDER — ASPIRIN 81 MG/1
81 TABLET ORAL DAILY
Status: DISCONTINUED | OUTPATIENT
Start: 2017-08-01 | End: 2017-08-22 | Stop reason: HOSPADM

## 2017-08-01 RX ORDER — BACLOFEN 10 MG/1
10 TABLET ORAL 2 TIMES DAILY
Status: DISCONTINUED | OUTPATIENT
Start: 2017-08-01 | End: 2017-08-22 | Stop reason: HOSPADM

## 2017-08-01 RX ORDER — DOCUSATE SODIUM 100 MG/1
100 CAPSULE, LIQUID FILLED ORAL 2 TIMES DAILY
Status: DISCONTINUED | OUTPATIENT
Start: 2017-08-01 | End: 2017-08-22 | Stop reason: HOSPADM

## 2017-08-01 RX ORDER — ENOXAPARIN SODIUM 100 MG/ML
30 INJECTION SUBCUTANEOUS EVERY 24 HOURS
Status: DISCONTINUED | OUTPATIENT
Start: 2017-08-02 | End: 2017-08-05

## 2017-08-01 RX ORDER — FINASTERIDE 5 MG/1
5 TABLET, FILM COATED ORAL DAILY
Status: DISCONTINUED | OUTPATIENT
Start: 2017-08-01 | End: 2017-08-22 | Stop reason: HOSPADM

## 2017-08-01 RX ORDER — SODIUM CHLORIDE 0.9 % (FLUSH) 0.9 %
5-10 SYRINGE (ML) INJECTION EVERY 8 HOURS
Status: DISCONTINUED | OUTPATIENT
Start: 2017-08-01 | End: 2017-08-22 | Stop reason: HOSPADM

## 2017-08-01 RX ADMIN — FINASTERIDE 5 MG: 5 TABLET, FILM COATED ORAL at 08:29

## 2017-08-01 RX ADMIN — Medication 10 ML: at 02:58

## 2017-08-01 RX ADMIN — ENOXAPARIN SODIUM 40 MG: 40 INJECTION SUBCUTANEOUS at 08:29

## 2017-08-01 RX ADMIN — DOCUSATE SODIUM 100 MG: 100 CAPSULE, LIQUID FILLED ORAL at 08:29

## 2017-08-01 RX ADMIN — Medication 10 ML: at 06:00

## 2017-08-01 RX ADMIN — DOCUSATE SODIUM 100 MG: 100 CAPSULE, LIQUID FILLED ORAL at 17:50

## 2017-08-01 RX ADMIN — SODIUM CHLORIDE 125 ML/HR: 900 INJECTION, SOLUTION INTRAVENOUS at 09:17

## 2017-08-01 RX ADMIN — BACLOFEN 10 MG: 10 TABLET ORAL at 17:50

## 2017-08-01 RX ADMIN — ASPIRIN 81 MG: 81 TABLET, COATED ORAL at 08:29

## 2017-08-01 RX ADMIN — CEFTRIAXONE 1 G: 1 INJECTION, POWDER, FOR SOLUTION INTRAMUSCULAR; INTRAVENOUS at 20:45

## 2017-08-01 RX ADMIN — PANTOPRAZOLE SODIUM 40 MG: 40 TABLET, DELAYED RELEASE ORAL at 06:00

## 2017-08-01 RX ADMIN — SODIUM CHLORIDE 125 ML/HR: 900 INJECTION, SOLUTION INTRAVENOUS at 16:31

## 2017-08-01 RX ADMIN — Medication 10 ML: at 14:00

## 2017-08-01 RX ADMIN — AZITHROMYCIN MONOHYDRATE 500 MG: 500 INJECTION, POWDER, LYOPHILIZED, FOR SOLUTION INTRAVENOUS at 21:16

## 2017-08-01 RX ADMIN — TUBERCULIN PURIFIED PROTEIN DERIVATIVE 5 UNITS: 5 INJECTION INTRADERMAL at 04:35

## 2017-08-01 RX ADMIN — BACLOFEN 10 MG: 10 TABLET ORAL at 08:29

## 2017-08-01 RX ADMIN — ACETAMINOPHEN 650 MG: 325 TABLET ORAL at 04:34

## 2017-08-01 RX ADMIN — Medication 10 ML: at 21:16

## 2017-08-01 RX ADMIN — SODIUM CHLORIDE 125 ML/HR: 900 INJECTION, SOLUTION INTRAVENOUS at 01:03

## 2017-08-01 RX ADMIN — FERROUS SULFATE TAB 325 MG (65 MG ELEMENTAL FE) 325 MG: 325 (65 FE) TAB at 06:00

## 2017-08-01 RX ADMIN — LEVOTHYROXINE SODIUM 100 MCG: 100 TABLET ORAL at 06:00

## 2017-08-01 NOTE — PROGRESS NOTES
Bedside report given to oncoming nurse, opportunity for questions given. Patient resting quietly in bed respirations are even and unlabored with no sign of distress noted at this time.

## 2017-08-01 NOTE — PROGRESS NOTES
Hospitalist Progress Note    2017  Admit Date: 2017 12:17 AM   NAME: Sherron Seals   :  10/18/1931   MRN:  [id number]   Attending: Michele Hurd MD  PCP:  None  Treatment Team: Attending Provider: Michele Hurd MD    Full Code   SUBJECTIVE:   Mr. Eileen Linton is a 81 yo male who presented as transfer from [address] [place] [address] with dx of UTI, fever, worsening renal failure, hypoxia, pneumonia (however CXR clear). Found to have UTI, febrile at 100.7, hypoxic, leukocytosis and ARF at [address] Pt started on rocephin and zithromycin and transferred DT. Today pt denies SOB, n/v/d, CP. On 3 L NC. On RA at home.       Past Medical History:   Diagnosis Date    CAD (coronary artery disease)      Recent Results (from the past 24 hour(s))   TSH 3RD GENERATION    Collection Time: 17  6:20 AM   Result Value Ref Range    TSH 0.378 0.358 - 3.740 uIU/mL   MAGNESIUM    Collection Time: 17  6:20 AM   Result Value Ref Range    Magnesium 1.9 1.8 - 2.4 mg/dL   VITAMIN B12    Collection Time: 17  6:20 AM   Result Value Ref Range    Vitamin B12 225 193 - 986 pg/mL   FOLATE    Collection Time: 17  6:20 AM   Result Value Ref Range    Folate 13.7 3.1 - 17.5 ng/mL   C REACTIVE PROTEIN, QT    Collection Time: 17  6:20 AM   Result Value Ref Range    C-Reactive protein 9.2 (H) 0.0 - 0.9 mg/dL   METABOLIC PANEL, BASIC    Collection Time: 17  6:20 AM   Result Value Ref Range    Sodium 140 136 - 145 mmol/L    Potassium 3.6 3.5 - 5.1 mmol/L    Chloride 107 98 - 107 mmol/L    CO2 26 21 - 32 mmol/L    Anion gap 7 7 - 16 mmol/L    Glucose 118 (H) 65 - 100 mg/dL    BUN 28 (H) 8 - 23 MG/DL    Creatinine 1.78 (H) 0.8 - 1.5 MG/DL    GFR est AA 47 (L) >60 ml/min/1.73m2    GFR est non-AA 39 (L) >60 ml/min/1.73m2    Calcium 7.8 (L) 8.3 - 10.4 MG/DL   CBC WITH AUTOMATED DIFF    Collection Time: 17  6:20 AM   Result Value Ref Range    WBC 11.4 (H) 4.3 - 11.1 K/uL    RBC 3.38 (L) 4.23 - 5.67 M/uL    HGB 10.6 (L) 13.6 - 17.2 g/dL    HCT 30.8 (L) 41.1 - 50.3 %    MCV 91.1 79.6 - 97.8 FL    MCH 31.4 26.1 - 32.9 PG    MCHC 34.4 31.4 - 35.0 g/dL    RDW 14.0 11.9 - 14.6 %    PLATELET 266 (L) 565 - 450 K/uL    MPV 11.0 10.8 - 14.1 FL    DF AUTOMATED      NEUTROPHILS 79 (H) 43 - 78 %    LYMPHOCYTES 10 (L) 13 - 44 %    MONOCYTES 11 4.0 - 12.0 %    EOSINOPHILS 0 (L) 0.5 - 7.8 %    BASOPHILS 0 0.0 - 2.0 %    IMMATURE GRANULOCYTES 0.3 0.0 - 5.0 %    ABS. NEUTROPHILS 9.0 (H) 1.7 - 8.2 K/UL    ABS. LYMPHOCYTES 1.1 0.5 - 4.6 K/UL    ABS. MONOCYTES 1.2 0.1 - 1.3 K/UL    ABS. EOSINOPHILS 0.0 0.0 - 0.8 K/UL    ABS. BASOPHILS 0.0 0.0 - 0.2 K/UL    ABS. IMM.  GRANS. 0.0 0.0 - 0.5 K/UL   PLEASE READ & DOCUMENT PPD TEST IN 24 HRS    Collection Time: 08/01/17  1:34 PM   Result Value Ref Range    PPD  Negative    mm Induration  00mm     No Known Allergies  Current Facility-Administered Medications   Medication Dose Route Frequency Provider Last Rate Last Dose    0.9% sodium chloride infusion  125 mL/hr IntraVENous CONTINUOUS Romario Ramos  mL/hr at 08/01/17 1631 125 mL/hr at 08/01/17 1631    sodium chloride (NS) flush 5-10 mL  5-10 mL IntraVENous Q8H Romario Ramos MD   10 mL at 08/01/17 1400    sodium chloride (NS) flush 5-10 mL  5-10 mL IntraVENous PRN Romario Ramos MD        acetaminophen (TYLENOL) tablet 650 mg  650 mg Oral Q4H PRN Romario Ramos MD   650 mg at 08/01/17 0434    albuterol (PROVENTIL VENTOLIN) nebulizer solution 2.5 mg  2.5 mg Nebulization Q6H PRN Romario Ramos MD        ondansetron [facility]) injection 4 mg  4 mg IntraVENous Q4H PRN Romario Ramos MD        aspirin delayed-release tablet 81 mg  81 mg Oral DAILY Romario Ramos MD   81 mg at 08/01/17 4312    baclofen (LIORESAL) tablet 10 mg  10 mg Oral BID Romario Ramos MD   10 mg at 08/01/17 1850    docusate sodium (COLACE) capsule 100 mg  100 mg Oral BID Romario Ramos MD   100 mg at 08/01/17 9472    ferrous sulfate tablet 325 mg  325 mg Oral CLEMENTINAB Esme Aldrich MD   325 mg at 17 0600    finasteride (PROSCAR) tablet 5 mg  5 mg Oral DAILY Esme Aldrich MD   5 mg at 17 8726    levothyroxine (SYNTHROID) tablet 100 mcg  100 mcg Oral MARSHALL Aldrich MD   100 mcg at 17 0600    nicotine (NICODERM CQ) 7 mg/24 hr patch 1 Patch  1 Patch TransDERmal Q24H Esme Aldrich MD   1 Patch at 17 0434    pantoprazole (PROTONIX) tablet 40 mg  40 mg Oral CLEMENTINAB Esme Aldrich MD   40 mg at 17 0600    azithromycin (ZITHROMAX) 500 mg in 0.9% sodium chloride (MBP/ADV) 250 mL  500 mg IntraVENous Q24H Esme Aldrich MD        cefTRIAXone (ROCEPHIN) 1 g in 0.9% sodium chloride (MBP/ADV) 50 mL MBP  1 g IntraVENous Q24H Peyton Driver, MD  Sueellen Lanes [place] 2017] READ PPD  1 Each Other Q24H Peyton Driver, MD  Sueellen Lanes [place] 2017] enoxaparin (LOVENOX) injection 30 mg  30 mg SubCUTAneous Q24H Esme Aldrich MD           Review of Systems negative with exception of pertinent positives noted above  PHYSICAL EXAM     Visit Vitals    /44 (BP 1 Location: Right arm)    Pulse 89    Temp 98.6 °F (37 °C)    Resp 19    Ht 5' 4\" (1.626 m)    Wt 67 kg (147 lb 9.6 oz)    SpO2 98%    BMI 25.34 kg/m2      Temp (24hrs), Av.4 °F (37.4 °C), Min:98.6 °F (37 °C), Max:100.8 °F (38.2 °C)    Oxygen Therapy  O2 Sat (%): 98 % (17 1553)  O2 Device: Nasal cannula (17 1406)  O2 Flow Rate (L/min): 3 l/min (17 1406)    Intake/Output Summary (Last 24 hours) at 17 1717  Last data filed at 17 0850   Gross per 24 hour   Intake              120 ml   Output              250 ml   Net             -130 ml      General: No acute distress    Lungs: Diminished all lobes bilaterally. Resp even and nonlabored  Heart:  S1S2 present without murmurs rubs gallops. RRR. No edema  Abdomen: Soft, non tender, non distended. BS present  Extremities: Moves ext spontaneously. Strength 4/5.   Neurologic:  No focal deficits.  A/O X3    Results summary of Diagnostic Studies/Procedures copied from within [facility] EMR:      Lavon Coe 96 Problems    Diagnosis Date Noted    CAP (community acquired pneumonia) 08/01/2017    Altered mental status 08/01/2017    UTI (urinary tract infection) 08/01/2017    Fever 08/01/2017     Plan:    CAP: continue rocephin, zithromax, CBC in AM    Acute metabolic encephalopathy: improved, a/o X3    UTI: continue rocephin, tx per [facility] UA results, no urine cx sent    ARF: continue IVF,  BMP in AM    Notes, labs, VS, diagnostic testing reviewed  Time spent with pt 30 min    DVT Prophylaxis: lovenox  Plan of Care Discussed with: Supervising MD Dr. Beni Ashley, care team, pt   Petros Engel NP

## 2017-08-01 NOTE — PROGRESS NOTES
TRANSFER - IN REPORT:    Verbal report received from Lillian Bourne, [address] (name) on Maria Del Rosario Pires  being received from [facility] (unit) for routine progression of care      Report consisted of patients Situation, Background, Assessment and   Recommendations(SBAR). Information from the following report(s) SBAR, Kardex, ED Summary, MAR, Recent Results and Cardiac Rhythm NSR was reviewed with the receiving nurse. Opportunity for questions and clarification was provided. Assessment completed upon patients arrival to unit and care assumed.

## 2017-08-01 NOTE — PROGRESS NOTES
Problem: Mobility Impaired (Adult and Pediatric)  Goal: *Acute Goals and Plan of Care (Insert Text)  STG:  (1.)Mr. Scheyder will move from supine to sit and sit to supine , scoot up and down and roll side to side with CONTACT GUARD ASSIST within 3 day(s). (2.)Mr. Scheyder will transfer from bed to chair and chair to bed with CONTACT GUARD ASSIST using the least restrictive device within 3 day(s). (3.)Mr. Scheyder will ambulate with CONTACT GUARD ASSIST for 30 feet with the least restrictive device within 3 day(s). LTG:  (1.)Mr. Scheyder will move from supine to sit and sit to supine , scoot up and down and roll side to side in bed with MODIFIED INDEPENDENCE within 7 day(s). (2.)Mr. Scheyder will transfer from bed to chair and chair to bed with MODIFIED INDEPENDENCE using the least restrictive device within 7 day(s). (3.)Mr. Scheyder will ambulate with SUPERVISION for 150+ feet with the least restrictive device within 7 day(s). ________________________________________________________________________________________________      PHYSICAL THERAPY: INITIAL ASSESSMENT, TREATMENT DAY: DAY OF ASSESSMENT, PM 8/1/2017  INPATIENT: Hospital Day: 1  Payor: Evan Simpson / Plan: [facility] MEDICARE CHOICE PPO/PFFS / Product Type: Managed Care Medicare /      NAME/AGE/GENDER: Jeanette Amanda is a 80 y.o. male          PRIMARY DIAGNOSIS: SEPSIS  CAP (community acquired pneumonia) CAP (community acquired pneumonia) CAP (community acquired pneumonia)        ICD-10: Treatment Diagnosis:       · Generalized Muscle Weakness (M62.81)  · Difficulty in walking, Not elsewhere classified (R26.2)   Precaution/Allergies:  Review of patient's allergies indicates no known allergies. ASSESSMENT:      Mr. Dulce Lucero is supine in bed upon contact and agreeable to PT evaluation this afternoon. Pt is oriented to person, place, and time. Pt reports 0/10 pain prior to activity.  Pt reports living alone with 6 steps to enter with B railing. Pt reports use of rollator for gait, independence with ADLs, reports 0 falls in past 6 months, and drives. Pt does not use supplemental O2 at baseline. Pt is min-modA with transition supine to sit. Pt at first has poor sitting balance but with cueing is able to maintain static sitting balance independently. Pt requesting to use bathroom. Pt is James with sit to stand to RW. Pt ambulated 10 ft into bathroom with James with increased difficulty with walker management and negotiation. Pt had episode of urinary incontinence while ambulating to bathroom but pt verbalized being unaware that he was urinating. Pt required max VC/TC for hand placement for safety with transition standing to sit on commode. Pt voided and required modA with sit to stand from commode (lower surface height) and began ambulating 10 ft with RW and James. Pt had increased difficulty with advancing L LE. Pt had an episode of instability and LOB with RW and was assisted back to bed with total A X 3 and assisted supine in bed with total A. Pt left supine in bed with all needs met and within reach. Pt would benefit from rehab following discharge from acute setting. Romina London will benefit from skilled PT (medically necessary) to address decreased strength, decreased balance, decreased functional tolerance, decreased cardiopulmonary endurance affecting participation in basic ADLs and functional tasks. This section established at most recent assessment   PROBLEM LIST (Impairments causing functional limitations):  1. Decreased Strength  2. Decreased ADL/Functional Activities  3. Decreased Transfer Abilities  4. Decreased Ambulation Ability/Technique  5. Decreased Balance  6. Decreased Activity Tolerance  7. Decreased Pacing Skills  8. Increased Fatigue  9. Increased Shortness of Breath  10.  Decreased Levy with Home Exercise Program    INTERVENTIONS PLANNED: (Benefits and precautions of physical therapy have been discussed with the patient.)  1. Balance Exercise  2. Bed Mobility  3. Family Education  4. Gait Training  5. Home Exercise Program (HEP)  6. Neuromuscular Re-education/Strengthening  7. Range of Motion (ROM)  8. Therapeutic Activites  9. Therapeutic Exercise/Strengthening  10. Transfer Training  11. Group Therapy      TREATMENT PLAN: Frequency/Duration: 3 times a week for duration of hospital stay  Rehabilitation Potential For Stated Goals: FAIR      RECOMMENDED REHABILITATION/EQUIPMENT: (at time of discharge pending progress): Continue Skilled Therapy and Rehab. HISTORY:   History of Present Injury/Illness (Reason for Referral):  See H&P below  81 y/o male with history of HTN, hypothyroidism, \"renal disorder\" and dementia is sent in transfer from [facility] with diagnosis of UTI, fever, worsening renal failure, hypoxemia and URI/pna. He is alert but a poor historian. His main complaint is of shivering. No family or visitors present to give more history. He states he lives with family. Workup at [facility] showed a UTI, temp 100.7, O2 sat 90% RA with PaO2 69. Cr up to 1.8 from 1.3. WBC ct 13.1, lactic acid 1.5 and . Chest xray was reported as normal. Do not know what patient's mental status baseline is to know if he is having delerium from acute illness or not. He was given IVF, rocephin and azithromycin and transferred to [address] [place] [address]. Patient denies chest pain, shortness of breath, nausea or vomiting. Has has malaise, weakness, cough and congestion. Past Medical History/Comorbidities:   Mr. Alix Romero  has a past medical history of CAD (coronary artery disease).   Mr. Alix Romero  has a past surgical history that includes cardiac surg procedure unlist.  Social History/Living Environment:   Home Environment: Private residence  # Steps to Enter: 6  Rails to Enter: Yes  Hand Rails : Bilateral  One/Two Story Residence: One story  Living Alone: Yes  Support Systems: Child(jessica)  Patient Expects to be Discharged to[de-identified] Private residence  Current DME Used/Available at Home: Lanelle Hope, rollator, Clearnce Acre, straight, Shower chair  Tub or Shower Type: Tub/Shower combination  Prior Level of Function/Work/Activity:  Lives alone, use of rollator for gait, indep with ADLs, 0 falls. Number of Personal Factors/Comorbidities that affect the Plan of Care: 1-2: MODERATE COMPLEXITY   EXAMINATION:   Most Recent Physical Functioning:   Gross Assessment:  AROM: Generally decreased, functional  Strength: Generally decreased, functional  Coordination: Generally decreased, functional  Sensation: Intact               Posture:     Balance:  Sitting: Intact  Standing: Impaired;Pull to stand; With support Bed Mobility:  Supine to Sit: Minimum assistance; Moderate assistance  Sit to Supine: Total assistance  Wheelchair Mobility:     Transfers:  Sit to Stand: Contact guard assistance;Minimum assistance  Stand to Sit: Minimum assistance  Gait:     Base of Support: Narrowed  Speed/Roshni: Slow;Shuffled  Step Length: Left shortened;Right shortened  Gait Abnormalities: Decreased step clearance; Step to gait  Distance (ft): 10 Feet (ft) (X2)  Assistive Device: Walker, rolling  Ambulation - Level of Assistance: Minimal assistance; Moderate assistance  Interventions: Safety awareness training; Tactile cues; Verbal cues       Body Structures Involved:  1. Heart  2. Lungs  3. Bones  4. Joints  5. Muscles Body Functions Affected:  1. Sensory/Pain  2. Cardio  3. Respiratory  4. Neuromusculoskeletal  5. Movement Related Activities and Participation Affected:  1. General Tasks and Demands  2. Mobility  3. Self Care  4. Domestic Life  5. Interpersonal Interactions and Relationships  6.  Community, Social and [place]   Number of elements that affect the Plan of Care: 4+: HIGH COMPLEXITY   CLINICAL PRESENTATION:   Presentation: Evolving clinical presentation with changing clinical characteristics: MODERATE COMPLEXITY   CLINICAL DECISION MAKIN [facility] [address] [id number] AM-PAC 6 Clicks   Basic Mobility Inpatient Short Form  How much difficulty does the patient currently have. .. Unable A Lot A Little None   1. Turning over in bed (including adjusting bedclothes, sheets and blankets)? [ ] 1   [ ] 2   [X] 3   [ ] 4   2. Sitting down on and standing up from a chair with arms ( e.g., wheelchair, bedside commode, etc.)   [ ] 1   [X] 2   [ ] 3   [ ] 4   3. Moving from lying on back to sitting on the side of the bed? [ ] 1   [ ] 2   [X] 3   [ ] 4   How much help from another person does the patient currently need. .. Total A Lot A Little None   4. Moving to and from a bed to a chair (including a wheelchair)? [ ] 1   [X] 2   [ ] 3   [ ] 4   5. Need to walk in hospital room? [ ] 1   [X] 2   [ ] 3   [ ] 4   6. Climbing 3-5 steps with a railing? [X] 1   [ ] 2   [ ] 3   [ ] 4   © 2007, Trustees of [address] [place], [place] [address], under license to [employer]. All rights reserved    Score:  Initial: 13 Most Recent: X (Date: -- )     Interpretation of Tool:  Represents activities that are increasingly more difficult (i.e. Bed mobility, Transfers, Gait). Score 24 23 22-20 19-15 14-10 9-7 6       Modifier CH CI CJ CK CL CM CN         · Mobility - Walking and Moving Around:               - CURRENT STATUS:    CL - 60%-79% impaired, limited or restricted               - GOAL STATUS:           CK - 40%-59% impaired, limited or restricted               - D/C STATUS:                       ---------------To be determined---------------  Payor: [employer] MEDICARE / Plan: [facility] [employer] MEDICARE CHOICE PPO/PFFS / Product Type: Managed Care Medicare /       Medical Necessity:     · Patient is expected to demonstrate progress in strength, range of motion, balance, coordination and functional technique to decrease assistance required with gait, transfers, and functional mobility.   Reason for Services/Other Comments:  · Patient continues to require skilled intervention due to decreased strength, decreased balance, decreased functional tolerance, decreased cardiopulmonary endurance affecting participation in basic ADLs and functional tasks. Use of outcome tool(s) and clinical judgement create a POC that gives a: Questionable prediction of patient's progress: MODERATE COMPLEXITY                 TREATMENT:   (In addition to Assessment/Re-Assessment sessions the following treatments were rendered)   Pre-treatment Symptoms/Complaints:  No complaints  Pain: Initial:   Pain Intensity 1: 0  Post Session:  0/10      Assessment/Reassessment only, no treatment provided today     Braces/Orthotics/Lines/Etc:   · IV  · O2 Device: Nasal cannula  Treatment/Session Assessment:    · Response to Treatment:  Pt participated well with PT ambulating 10 ft X 2 with RW and James-modA, increased difficulty advancing L LE  · Interdisciplinary Collaboration:  · Physical Therapist  · Registered Nurse  · Certified Nursing Assistant/Patient Care Technician  · After treatment position/precautions:  · Supine in bed  · Bed/Chair-wheels locked  · Bed in low position  · Call light within reach  · RN notified  · Compliance with Program/Exercises: Will assess as treatment progresses. · Recommendations/Intent for next treatment session: \"Next visit will focus on advancements to more challenging activities and reduction in assistance provided\".   Total Treatment Duration:  PT Patient Time In/Time Out  Time In: 1353  Time Out: [address]

## 2017-08-01 NOTE — PROGRESS NOTES
Bedside report  received from Oleksandr Jones, [facility]. Assessment completed. Bed is in low and locked position with floor free of objects. Patient AxOx3, and resting quietly in bed. No needs noted at present. Respirations even and non labored. Breath sounds are clear equal and diminished. Verbalized understanding to call for needs. Call light within reach. Will continue to monitor pt.

## 2017-08-01 NOTE — PROGRESS NOTES
Pt was brought to room via EMS pt. placed on bed, gown placed on pt, vitals checked, assessment done. Dual skin assessment done by this RN and Leopold Risser, RN. Pt has small, red, blanchable area on sacrum with no skin breakdown noted. Pt toe nails are yellow tinted but no injury noted on feet. Pt skin otherwise clean dry and intact with no breakdown noted.

## 2017-08-01 NOTE — H&P
Hospitalist H&P/Consult Note     Admit Date:  2017 12:17 AM   Name:  Amadou Callejas   Age:  80 y.o.  :  10/18/1931   MRN:  [id number]   PCP:  PROVIDER UNKNOWN  Treatment Team: Attending Provider: Nicole Linton MD    HPI:   79 y/o male with history of HTN, hypothyroidism, \"renal disorder\" and dementia is sent in transfer from [address] [place] [address] with diagnosis of UTI, fever, worsening renal failure, hypoxemia and URI/pna. He is alert but a poor historian. His main complaint is of shivering. No family or visitors present to give more history. He states he lives with family. Workup at [address] [place] [address] showed a UTI, temp 100.7, O2 sat 90% RA with PaO2 69. Cr up to 1.8 from 1.3. WBC ct 13.1, lactic acid 1.5 and . Chest xray was reported as normal. Do not know what patient's mental status baseline is to know if he is having delerium from acute illness or not. He was given IVF, rocephin and azithromycin and transferred to [address] [place] [address]. Patient denies chest pain, shortness of breath, nausea or vomiting. Has has malaise, weakness, cough and congestion. 10 systems reviewed and negative except as noted in HPI. Past Medical History:   Diagnosis Date    CAD (coronary artery disease)       Past Surgical History:   Procedure Laterality Date    CARDIAC SURG PROCEDURE UNLIST        Prior to Admission Medications   Prescriptions Last Dose Informant Patient Reported? Taking?   aspirin delayed-release 81 mg tablet 2017 at Unknown time  Yes Yes   Sig: Take 81 mg by mouth daily. Indications: myocardial infarction prevention, Myocardial Reinfarction Prevention   baclofen (LIORESAL) 10 mg tablet 2017 at Unknown time  Yes Yes   Sig: Take 10 mg by mouth two (2) times a day. diclofenac (VOLTAREN) 1 % gel Not Taking at Unknown time  Yes No   Sig: Apply  to affected area four (4) times daily. docusate sodium (COLACE) 100 mg capsule Not Taking at Unknown time  Yes No   Sig: Take 100 mg by mouth two (2) times a day. ferrous sulfate 325 mg (65 mg iron) tablet 2017 at Unknown time  Yes Yes   Sig: Take 325 mg by mouth Daily (before breakfast). finasteride (PROSCAR) 5 mg tablet 2017 at Unknown time  Yes Yes   Sig: Take 5 mg by mouth daily. hydroCHLOROthiazide (HYDRODIURIL) 25 mg tablet 2017 at Unknown time  Yes Yes   Sig: Take 25 mg by mouth daily. levothyroxine (SYNTHROID) 100 mcg tablet 2017 at Unknown time  Yes Yes   Sig: Take 100 mcg by mouth Daily (before breakfast). lisinopril (PRINIVIL, ZESTRIL) 40 mg tablet 2017 at Unknown time  Yes Yes   Sig: Take 40 mg by mouth daily. meloxicam (MOBIC) 7.5 mg tablet 2017 at Unknown time  Yes Yes   Sig: Take 7.5 mg by mouth daily. nicotine (NICODERM CQ) 7 mg/24 hr Not Taking at Unknown time  Yes No   Si Patch by TransDERmal route every twenty-four (24) hours. omeprazole (PRILOSEC) 20 mg capsule 2017 at Unknown time  Yes Yes   Sig: Take 20 mg by mouth daily. Facility-Administered Medications: None     No Known Allergies   Social History   Substance Use Topics    Smoking status: Current Every Day Smoker    Smokeless tobacco: Not on file    Alcohol use Not on file      No family history on file. There is no immunization history for the selected administration types on file for this patient. Objective:     Patient Vitals for the past 24 hrs:   Temp Pulse Resp BP SpO2   17 0028 98.6 °F (37 °C) 67 18 146/66 100 %     Oxygen Therapy  O2 Sat (%): 100 % (17 0028)  No intake or output data in the 24 hours ending 17 0159    Physical Exam:  General:    Alert but poor historian, knows he is in [address]:   Normal sclera. Extraocular movements intact. ENT:  Normocephalic, atraumatic. Dry mucous membranes  CV:   RRR. No murmur, rub, or gallop. Lungs:  Coarse bilaterally  Abdomen: Soft, nontender, nondistended. Bowel sounds normal.   Extremities: Warm and dry. No cyanosis or edema.   Neurologic: CN II-XII grossly intact. Sensation intact. Skin:     No rashes or jaundice. No wounds. Psych:  Normal mood and affect. I reviewed the labs, imaging, EKGs, telemetry, and other studies done this admission. Data Review:   No results found for this or any previous visit (from the past 24 hour(s)).     Imaging Studies:  CXR Results  (Last 48 hours)    None        CT Results  (Last 48 hours)    None          Assessment and Plan:     Hospital Problems as of 8/1/2017  Never Reviewed          Codes Class Noted - Resolved POA    * (Principal)CAP (community acquired pneumonia) ICD-10-CM: J18.9  ICD-9-CM: 486  8/1/2017 - Present Unknown        Altered mental status ICD-10-CM: R41.82  ICD-9-CM: 780.97  8/1/2017 - Present Yes        UTI (urinary tract infection) ICD-10-CM: N39.0  ICD-9-CM: 599.0  8/1/2017 - Present Yes        Fever ICD-10-CM: R50.9  ICD-9-CM: 780.60  8/1/2017 - Present Yes              PLAN:  · Admit inpatient, medical bed  · Bedrest, IVF--monitor renal function  · Hold ACE I and HCTZ  · O2 as needed  · IV rocephin and azithromycin  · PPD, case management consult for HHS vs rehab, PT/OT evals  · Nebulizer Rx, flutter valve and incentive spirometry  · Check TSH, B12, folate, Mg      Estimated LOS:  2 midnights    Signed:  Dalila Regalado MD

## 2017-08-01 NOTE — PROGRESS NOTES
Problem: Interdisciplinary Rounds  Goal: Interdisciplinary Rounds  Outcome: Progressing Towards Goal  Interdisciplinary team rounds were held 8/1/2017 with the following team members:Care Management, Nursing and Clinical Coordinator and the patient. Plan of care discussed. See clinical pathway and/or care plan for interventions and desired outcomes.

## 2017-08-02 LAB
ANION GAP BLD CALC-SCNC: 8 MMOL/L (ref 7–16)
BASOPHILS # BLD AUTO: 0 K/UL (ref 0–0.2)
BASOPHILS # BLD: 0 % (ref 0–2)
BUN SERPL-MCNC: 19 MG/DL (ref 8–23)
CALCIUM SERPL-MCNC: 7.9 MG/DL (ref 8.3–10.4)
CHLORIDE SERPL-SCNC: 112 MMOL/L (ref 98–107)
CO2 SERPL-SCNC: 23 MMOL/L (ref 21–32)
CREAT SERPL-MCNC: 1.25 MG/DL (ref 0.8–1.5)
CRP SERPL-MCNC: 14.9 MG/DL (ref 0–0.9)
DIFFERENTIAL METHOD BLD: ABNORMAL
EOSINOPHIL # BLD: 0.1 K/UL (ref 0–0.8)
EOSINOPHIL NFR BLD: 1 % (ref 0.5–7.8)
ERYTHROCYTE [DISTWIDTH] IN BLOOD BY AUTOMATED COUNT: 14 % (ref 11.9–14.6)
GLUCOSE SERPL-MCNC: 98 MG/DL (ref 65–100)
HCT VFR BLD AUTO: 32.4 % (ref 41.1–50.3)
HGB BLD-MCNC: 11.2 G/DL (ref 13.6–17.2)
IMM GRANULOCYTES # BLD: 0 K/UL (ref 0–0.5)
IMM GRANULOCYTES NFR BLD AUTO: 0.4 % (ref 0–5)
LYMPHOCYTES # BLD AUTO: 11 % (ref 13–44)
LYMPHOCYTES # BLD: 1 K/UL (ref 0.5–4.6)
MCH RBC QN AUTO: 31.6 PG (ref 26.1–32.9)
MCHC RBC AUTO-ENTMCNC: 34.6 G/DL (ref 31.4–35)
MCV RBC AUTO: 91.5 FL (ref 79.6–97.8)
MM INDURATION POC: 0 MM (ref 0–5)
MONOCYTES # BLD: 1.2 K/UL (ref 0.1–1.3)
MONOCYTES NFR BLD AUTO: 12 % (ref 4–12)
NEUTS SEG # BLD: 7.6 K/UL (ref 1.7–8.2)
NEUTS SEG NFR BLD AUTO: 76 % (ref 43–78)
PLATELET # BLD AUTO: 110 K/UL (ref 150–450)
PMV BLD AUTO: 10.7 FL (ref 10.8–14.1)
POTASSIUM SERPL-SCNC: 3.8 MMOL/L (ref 3.5–5.1)
PPD POC: NEGATIVE NEGATIVE
RBC # BLD AUTO: 3.54 M/UL (ref 4.23–5.67)
SODIUM SERPL-SCNC: 143 MMOL/L (ref 136–145)
WBC # BLD AUTO: 9.9 K/UL (ref 4.3–11.1)

## 2017-08-02 PROCEDURE — 94760 N-INVAS EAR/PLS OXIMETRY 1: CPT

## 2017-08-02 PROCEDURE — 36415 COLL VENOUS BLD VENIPUNCTURE: CPT | Performed by: HOSPITALIST

## 2017-08-02 PROCEDURE — 74011250636 HC RX REV CODE- 250/636: Performed by: HOSPITALIST

## 2017-08-02 PROCEDURE — 80048 BASIC METABOLIC PNL TOTAL CA: CPT | Performed by: HOSPITALIST

## 2017-08-02 PROCEDURE — 97166 OT EVAL MOD COMPLEX 45 MIN: CPT

## 2017-08-02 PROCEDURE — 74011250637 HC RX REV CODE- 250/637: Performed by: HOSPITALIST

## 2017-08-02 PROCEDURE — 74011000258 HC RX REV CODE- 258: Performed by: HOSPITALIST

## 2017-08-02 PROCEDURE — 77010033678 HC OXYGEN DAILY

## 2017-08-02 PROCEDURE — 86140 C-REACTIVE PROTEIN: CPT | Performed by: HOSPITALIST

## 2017-08-02 PROCEDURE — 85025 COMPLETE CBC W/AUTO DIFF WBC: CPT | Performed by: HOSPITALIST

## 2017-08-02 PROCEDURE — 65270000029 HC RM PRIVATE

## 2017-08-02 RX ADMIN — FERROUS SULFATE TAB 325 MG (65 MG ELEMENTAL FE) 325 MG: 325 (65 FE) TAB at 05:59

## 2017-08-02 RX ADMIN — BACLOFEN 10 MG: 10 TABLET ORAL at 17:15

## 2017-08-02 RX ADMIN — LEVOTHYROXINE SODIUM 100 MCG: 100 TABLET ORAL at 05:59

## 2017-08-02 RX ADMIN — AZITHROMYCIN MONOHYDRATE 500 MG: 500 INJECTION, POWDER, LYOPHILIZED, FOR SOLUTION INTRAVENOUS at 22:06

## 2017-08-02 RX ADMIN — BACLOFEN 10 MG: 10 TABLET ORAL at 08:53

## 2017-08-02 RX ADMIN — Medication 10 ML: at 05:59

## 2017-08-02 RX ADMIN — FINASTERIDE 5 MG: 5 TABLET, FILM COATED ORAL at 08:53

## 2017-08-02 RX ADMIN — SODIUM CHLORIDE 125 ML/HR: 900 INJECTION, SOLUTION INTRAVENOUS at 03:00

## 2017-08-02 RX ADMIN — PANTOPRAZOLE SODIUM 40 MG: 40 TABLET, DELAYED RELEASE ORAL at 05:59

## 2017-08-02 RX ADMIN — SODIUM CHLORIDE 125 ML/HR: 900 INJECTION, SOLUTION INTRAVENOUS at 11:12

## 2017-08-02 RX ADMIN — DOCUSATE SODIUM 100 MG: 100 CAPSULE, LIQUID FILLED ORAL at 17:15

## 2017-08-02 RX ADMIN — ENOXAPARIN SODIUM 30 MG: 40 INJECTION SUBCUTANEOUS at 09:04

## 2017-08-02 RX ADMIN — SODIUM CHLORIDE 125 ML/HR: 900 INJECTION, SOLUTION INTRAVENOUS at 20:08

## 2017-08-02 RX ADMIN — ACETAMINOPHEN 650 MG: 325 TABLET ORAL at 20:21

## 2017-08-02 RX ADMIN — ASPIRIN 81 MG: 81 TABLET, COATED ORAL at 08:53

## 2017-08-02 RX ADMIN — Medication 10 ML: at 22:07

## 2017-08-02 RX ADMIN — Medication 5 ML: at 14:00

## 2017-08-02 RX ADMIN — DOCUSATE SODIUM 100 MG: 100 CAPSULE, LIQUID FILLED ORAL at 08:53

## 2017-08-02 RX ADMIN — CEFTRIAXONE 1 G: 1 INJECTION, POWDER, FOR SOLUTION INTRAMUSCULAR; INTRAVENOUS at 20:21

## 2017-08-02 NOTE — PROGRESS NOTES
Problem: Self Care Deficits Care Plan (Adult)  Goal: *Acute Goals and Plan of Care (Insert Text)  1. Patient will complete lower body bathing and dressing with modified independence and adaptive equipment as needed. 2. Patient will complete toileting with modified independence. 3. Patient will tolerate 23 minutes of OT treatment with less than 3 rest breaks to increase activity tolerance for ADLs. 4. Patient will complete functional transfers with modified independence and adaptive equipment as needed. Timeframe: 7 visits     Comments:       OCCUPATIONAL THERAPY: Initial Assessment and AM 8/2/2017  INPATIENT: Hospital Day: 2  Payor: Peña Mendoza / Plan: [facility] HUMANA MEDICARE CHOICE PPO/PFFS / Product Type: Managed Care Medicare /      NAME/AGE/GENDER: Maria Del Rosario Pires is a 80 y.o. male          PRIMARY DIAGNOSIS:  SEPSIS  CAP (community acquired pneumonia) CAP (community acquired pneumonia) CAP (community acquired pneumonia)        ICD-10: Treatment Diagnosis:        · Generalized Muscle Weakness (M62.81)   Precautions/Allergies:         Review of patient's allergies indicates no known allergies. ASSESSMENT:      Mr. Betzy Gomez presents to hospital for above. Pt reports that he lives alone in a [facility], there are [facility] with bilateral hand rails. Pt has access to a cane and rollator, but he mostly uses the rollator for functional mobility. He is typically independent/mod I with ADLs/IADLs. Pt is no longer driving. Per chart, pt PMHx includes dementia and reports that he lives with family, so information gathered above may be inaccurate. Today, he is supine in bed upon arrival. He is alert and oriented to person, disoriented to place and situation. He moves supine to sitting at EOB with CGA/min A. Pt demonstrates intact sitting balance at EOB. Pt completes STS and functional transfer from bed to chair with CGA/min A and RW, demonstrating fair static/dynamic standing balance.  Pt requires tactile and verbal cueing with RW during transfer as he appear confused at times. He was left seated in chair with all needs met and all belongings within reach. Pt is functioning below his baseline and requires skilled OT services aimed at maximizing safety and independence with ADLs at pt lives alone. Will follow during acute stay. This section established at most recent assessment   PROBLEM LIST (Impairments causing functional limitations):  1. Decreased Strength  2. Decreased ADL/Functional Activities  3. Decreased Transfer Abilities  4. Decreased Ambulation Ability/Technique  5. Decreased Balance  6. Decreased Activity Tolerance  7. Decreased Pacing Skills  8. Decreased Work Simplification/Energy Conservation Techniques  9. Increased Fatigue  10. Increased Shortness of Breath  11. Decreased [place] with Home Exercise Program  12. Decreased Cognition    INTERVENTIONS PLANNED: (Benefits and precautions of occupational therapy have been discussed with the patient.)  1. Activities of daily living training  2. Adaptive equipment training  3. Balance training  4. Group therapy  5. Therapeutic activity  6. Therapeutic exercise      TREATMENT PLAN: Frequency/Duration: Follow patient 3x/week to address above goals. Rehabilitation Potential For Stated Goals: GOOD      RECOMMENDED REHABILITATION/EQUIPMENT: (at time of discharge pending progress): Continue Skilled Therapy. OCCUPATIONAL PROFILE AND HISTORY:   History of Present Injury/Illness (Reason for Referral):  See H&P  Past Medical History/Comorbidities:   Mr. Torie Goldman  has a past medical history of CAD (coronary artery disease).   Mr. Torie Goldman  has a past surgical history that includes cardiac surg procedure unlist.  Social History/Living Environment:   Home Environment: Private residence  # Steps to Enter: 6  Rails to Enter: Yes  Hand Rails : Bilateral  One/Two Story Residence: One story  Living Alone: Yes  Support Systems: Child(jessica)  Patient Expects to be Discharged to[de-identified] Private residence  Current DME Used/Available at Home: beba Abad Arvin Monica, straight, [address] chair  Tub or Shower Type: Tub/Shower combination  Prior Level of Function/Work/Activity:  [place]/mod I with ADLs/IADLs  Personal Factors:          Sex:  male        Age:  80 y.o. Number of Personal Factors/Comorbidities that affect the Plan of Care: Expanded review of therapy/medical records (1-2):  MODERATE COMPLEXITY   ASSESSMENT OF OCCUPATIONAL PERFORMANCE[de-identified]   Activities of Daily Living:           Basic ADLs (From Assessment) Complex ADLs (From Assessment)   Basic ADL  Feeding: Independent  Oral Facial Hygiene/Grooming: Modified Independent  Bathing: Minimum assistance  Upper Body Dressing: Modified independent  Lower Body Dressing: Moderate assistance  Toileting: Minimum assistance Instrumental ADL  Meal Preparation: Moderate assistance  Homemaking: Maximum assistance  Medication Management: Supervision  Financial Management: Setup   Grooming/Bathing/Dressing Activities of Daily Living     Cognitive Retraining  Safety/Judgement: Awareness of environment; Fall prevention;Home safety                       Bed/Mat Mobility  Supine to Sit: Minimum assistance  Sit to Stand: Contact guard assistance  Bed to Chair: Minimum assistance  Scooting: Moderate assistance          Most Recent Physical Functioning:   Gross Assessment:  AROM: Generally decreased, functional  Strength: Generally decreased, functional  Coordination: Within functional limits  Sensation: Intact               Posture:     Balance:  Sitting: Intact  Standing: Impaired  Standing - Static: Fair  Standing - Dynamic : Fair Bed Mobility:  Supine to Sit: Minimum assistance  Scooting:  Moderate assistance  Wheelchair Mobility:     Transfers:  Sit to Stand: Contact guard assistance  Stand to Sit: Contact guard assistance  Bed to Chair: Minimum assistance                    Patient Vitals for the past 6 hrs:       BP SpO2 Pulse 17 1101 126/56 100 % 60        Mental Status  Neurologic State: Alert  Orientation Level: Oriented to person, Disoriented to place, Disoriented to situation  Cognition: Follows commands  Perception: Appears intact  Perseveration: No perseveration noted  Safety/Judgement: Awareness of environment, Fall prevention, Home safety                               Physical Skills Involved:  1. Range of Motion  2. Balance  3. Strength  4. Activity Tolerance  5. Gross Motor Control Cognitive Skills Affected (resulting in the inability to perform in a timely and safe manner):  1. Executive Function  2. Immediate Memory  3. Short Term Recall  4. Comprehension Psychosocial Skills Affected:  1. Habits/Routines  2. Environmental Adaptation   Number of elements that affect the Plan of Care: 5+:  HIGH COMPLEXITY   CLINICAL DECISION MAKIN[address] [place] [address] AM-PAC 6 Clicks   Daily Activity Inpatient Short Form  How much help from another person does the patient currently need. .. Total A Lot A Little None   1. Putting on and taking off regular lower body clothing?   [ ] 1   [X] 2   [ ] 3   [ ] 4   2. Bathing (including washing, rinsing, drying)? [ ] 1   [ ] 2   [X] 3   [ ] 4   3. Toileting, which includes using toilet, bedpan or urinal?   [ ] 1   [ ] 2   [X] 3   [ ] 4   4. Putting on and taking off regular upper body clothing?   [ ] 1   [ ] 2   [ ] 3   [X] 4   5. Taking care of personal grooming such as brushing teeth? [ ] 1   [ ] 2   [ ] 3   [X] 4   6. Eating meals? [ ] 1   [ ] 2   [ ] 3   [X] 4   © , Trustees of [address] [place] [address], under license to [employer]. All rights reserved    Score:  Initial: 20 Most Recent: X (Date: -- )     Interpretation of Tool:  Represents activities that are increasingly more difficult (i.e. Bed mobility, Transfers, Gait).        Score 24 23 22-20 19-15 14-10 9-7 6       Modifier CH CI CJ CK CL CM CN         · Self Care:               - CURRENT STATUS:    CJ - 20%-39% impaired, limited or restricted               - GOAL STATUS:           CI - 1%-19% impaired, limited or restricted               - D/C STATUS:                       ---------------To be determined---------------  Payor: HUMANA MEDICARE / Plan: [facility] HUMANA MEDICARE CHOICE PPO/PFFS / Product Type: Managed Care Medicare /       Medical Necessity:     · Patient is expected to demonstrate progress in strength, balance, coordination, functional technique and activity tolerance to improve safety during ADLs. Reason for Services/Other Comments:  · Patient continues to require skilled intervention due to medical complications. Use of outcome tool(s) and clinical judgement create a POC that gives a: MODERATE COMPLEXITY             TREATMENT:   (In addition to Assessment/Re-Assessment sessions the following treatments were rendered)      Pre-treatment Symptoms/Complaints:    Pain: Initial:   Pain Intensity 1: 0  Post Session:  same      Assessment/Reassessment only, no treatment provided today     Braces/Orthotics/Lines/Etc:   · IV  · O2 Device: Nasal cannula  Treatment/Session Assessment:    · Response to Treatment:  eval only. · Interdisciplinary Collaboration:  · Physical Therapist  · Occupational Therapist  · Registered Nurse  · Certified Nursing Assistant/Patient Care Technician  · After treatment position/precautions:  · Up in chair  · Bed alarm/tab alert on  · Bed/Chair-wheels locked  · Call light within reach  · Compliance with Program/Exercises: compliant all of the time today. · Recommendations/Intent for next treatment session: \"Next visit will focus on advancements to more challenging activities and reduction in assistance provided\".   Total Treatment Duration:  OT Patient Time In/Time Out  Time In: 6594  Time Out: [address], [address]

## 2017-08-02 NOTE — PROGRESS NOTES
Pt. Alert and Oriented respirations are even and unlabored with no signs of distress, pt has no complaints of pain at this time. Bedside report given to oncoming nurse, opportunity for questions given.

## 2017-08-02 NOTE — PROGRESS NOTES
Family at beside. Family member states she is the granddtr is concerned about pt welfare.  consult ordered.

## 2017-08-02 NOTE — PROGRESS NOTES
Progress Note    2017  Admit Date: 2017 12:17 AM   NAME: Gilberto Gallegos   :  10/18/1931   MRN:  [id number]   Attending: Fabio Varela MD  PCP:  None  Treatment Team: Attending Provider: Fabio Varela MD; Utilization Review: Sylvia Person RN    Full Code   SUBJECTIVE:   Mr. Leonie Pike is a 79 yo male who presented as transfer from [facility] with dx of UTI, fever, worsening renal failure, hypoxia, pneumonia (however CXR clear). Found to have UTI, febrile at 100.7, hypoxic, leukocytosis and ARF at [facility]. Pt started on rocephin and zithromycin and transferred DT. Today pt sitting up in chair denies SOB, n/v/d, CP. On 3 L NC. On RA at home.   Anxious to be DC.         Past Medical History:   Diagnosis Date    CAD (coronary artery disease)      Recent Results (from the past 24 hour(s))   PLEASE READ & DOCUMENT PPD TEST IN 72 HRS    Collection Time: 17  4:30 AM   Result Value Ref Range    PPD negative Negative    mm Induration 0 mm   C REACTIVE PROTEIN, QT    Collection Time: 17  8:03 AM   Result Value Ref Range    C-Reactive protein 14.9 (H) 0.0 - 0.9 mg/dL   METABOLIC PANEL, BASIC    Collection Time: 17  8:03 AM   Result Value Ref Range    Sodium 143 136 - 145 mmol/L    Potassium 3.8 3.5 - 5.1 mmol/L    Chloride 112 (H) 98 - 107 mmol/L    CO2 23 21 - 32 mmol/L    Anion gap 8 7 - 16 mmol/L    Glucose 98 65 - 100 mg/dL    BUN 19 8 - 23 MG/DL    Creatinine 1.25 0.8 - 1.5 MG/DL    GFR est AA >60 >60 ml/min/1.73m2    GFR est non-AA 58 (L) >60 ml/min/1.73m2    Calcium 7.9 (L) 8.3 - 10.4 MG/DL   CBC WITH AUTOMATED DIFF    Collection Time: 17  8:03 AM   Result Value Ref Range    WBC 9.9 4.3 - 11.1 K/uL    RBC 3.54 (L) 4.23 - 5.67 M/uL    HGB 11.2 (L) 13.6 - 17.2 g/dL    HCT 32.4 (L) 41.1 - 50.3 %    MCV 91.5 79.6 - 97.8 FL    MCH 31.6 26.1 - 32.9 PG    MCHC 34.6 31.4 - 35.0 g/dL    RDW 14.0 11.9 - 14.6 %    PLATELET 673 (L) 628 - 450 K/uL    MPV 10.7 (L) 10.8 - 14.1 FL    DF AUTOMATED      NEUTROPHILS 76 43 - 78 %    LYMPHOCYTES 11 (L) 13 - 44 %    MONOCYTES 12 4.0 - 12.0 %    EOSINOPHILS 1 0.5 - 7.8 %    BASOPHILS 0 0.0 - 2.0 %    IMMATURE GRANULOCYTES 0.4 0.0 - 5.0 %    ABS. NEUTROPHILS 7.6 1.7 - 8.2 K/UL    ABS. LYMPHOCYTES 1.0 0.5 - 4.6 K/UL    ABS. MONOCYTES 1.2 0.1 - 1.3 K/UL    ABS. EOSINOPHILS 0.1 0.0 - 0.8 K/UL    ABS. BASOPHILS 0.0 0.0 - 0.2 K/UL    ABS. IMM.  GRANS. 0.0 0.0 - 0.5 K/UL     No Known Allergies  Current Facility-Administered Medications   Medication Dose Route Frequency Provider Last Rate Last Dose    0.9% sodium chloride infusion  125 mL/hr IntraVENous CONTINUOUS John Vivas  mL/hr at 08/02/17 1112 125 mL/hr at 08/02/17 1112    sodium chloride (NS) flush 5-10 mL  5-10 mL IntraVENous Q8H John Vivas MD   5 mL at 08/02/17 1400    sodium chloride (NS) flush 5-10 mL  5-10 mL IntraVENous PRN John Vivas MD        acetaminophen (TYLENOL) tablet 650 mg  650 mg Oral Q4H PRN John Vivas MD   650 mg at 08/01/17 0434    albuterol (PROVENTIL VENTOLIN) nebulizer solution 2.5 mg  2.5 mg Nebulization Q6H PRN John Vivas MD        [facility]) injection 4 mg  4 mg IntraVENous Q4H PRN John Vivas MD        aspirin delayed-release tablet 81 mg  81 mg Oral DAILY John Vivas MD   81 mg at 08/02/17 0046    baclofen (LIORESAL) tablet 10 mg  10 mg Oral BID John Vivas MD   10 mg at 08/02/17 8505    docusate sodium (COLACE) capsule 100 mg  100 mg Oral BID John Vivas MD   100 mg at 08/02/17 1808    ferrous sulfate tablet 325 mg  325 mg Oral ACB John Vivas MD   325 mg at 08/02/17 0559    finasteride (PROSCAR) tablet 5 mg  5 mg Oral DAILY John Vivas MD   5 mg at 08/02/17 4843    levothyroxine (SYNTHROID) tablet 100 mcg  100 mcg Oral ACB John Vivas MD   100 mcg at 08/02/17 0559    nicotine (NICODERM CQ) 7 mg/24 hr patch 1 Patch  1 Patch TransDERmal Q24H John Vivas MD   1 Patch at 17 0313    pantoprazole (PROTONIX) tablet 40 mg  40 mg Oral ACB Christian Lopez MD   40 mg at 17 0559    azithromycin (ZITHROMAX) 500 mg in 0.9% sodium chloride (MBP/ADV) 250 mL  500 mg IntraVENous Q24H Christian Lopez  mL/hr at 17 2116 500 mg at 17 2116    cefTRIAXone (ROCEPHIN) 1 g in 0.9% sodium chloride (MBP/ADV) 50 mL MBP  1 g IntraVENous Q24H Christian Lopez  mL/hr at 17 2045 1 g at 17 204    READ PPD  1 Each Other Q24H Christian Lopez MD   1 Each at 17 0400    enoxaparin (LOVENOX) injection 30 mg  30 mg SubCUTAneous Q24H Christian Lopez MD   30 mg at 17 6111       Review of Systems negative with exception of pertinent positives noted above  PHYSICAL EXAM     Visit Vitals    /43    Pulse 74    Temp 97.1 °F (36.2 °C)    Resp 19    Ht 5' 4\" (1.626 m)    Wt 67 kg (147 lb 9.6 oz)    SpO2 99%    BMI 25.34 kg/m2      Temp (24hrs), Av.5 °F (36.9 °C), Min:97.1 °F (36.2 °C), Max:100.4 °F (38 °C)    Oxygen Therapy  O2 Sat (%): 99 % (17 1458)  O2 Device: Nasal cannula (17)  O2 Flow Rate (L/min): 3 l/min (17)    Intake/Output Summary (Last 24 hours) at 17 1532  Last data filed at 17 1200   Gross per 24 hour   Intake              640 ml   Output              225 ml   Net              415 ml      General:                 No acute distress    Lungs:                    CTA bilaterally. Resp even and nonlabored  Heart:                      S1S2 present without murmurs rubs gallops. RRR. No edema  Abdomen:              Soft, non tender, non distended. BS present  Extremities:           Moves ext spontaneously. Strength 4/5. Neurologic:            No focal deficits.  A/O X3    Results summary of Diagnostic Studies/Procedures copied from within [facility] EMR:         Lavon Coe 96 Problems    Diagnosis Date Noted    CAP (community acquired pneumonia) 2017    Altered mental status 08/01/2017    UTI (urinary tract infection) 08/01/2017    Fever 08/01/2017     Plan:    CAP: continue rocephin, zithromax, CBC in AM     Acute metabolic encephalopathy: improved, a/o X3     UTI: continue rocephin, tx per [facility] UA results, no urine cx sent     ARF: improved, continue IVF,  BMP in AM     Notes, labs, VS, diagnostic testing reviewed  Time spent with pt 30 min     DVT Prophylaxis: lovenox  Plan of Care Discussed with: Supervising MD Dr. Keven Mendez, care team, pt   Prosper Encarnacion, SHAWANDA

## 2017-08-02 NOTE — PROGRESS NOTES
Pt became SOB while be assisted to the bathroom and hands were shaking. O2 sat on RA 95-96% will continue to monitor.

## 2017-08-02 NOTE — PROGRESS NOTES
[address] from [address] Patient in bed resting, awakens easily. No c/o pain or distress. Will continue to monitor.

## 2017-08-02 NOTE — PROGRESS NOTES
Per order pt off O2 @ 3L. Resp even and unlabored O2 sat at 95%. No s/sx distress. No c/o sob. Will continue to monitor.

## 2017-08-03 ENCOUNTER — APPOINTMENT (OUTPATIENT)
Dept: ULTRASOUND IMAGING | Age: 82
DRG: 853 | End: 2017-08-03
Attending: HOSPITALIST
Payer: MEDICARE

## 2017-08-03 LAB
ANION GAP BLD CALC-SCNC: 12 MMOL/L (ref 7–16)
BUN SERPL-MCNC: 15 MG/DL (ref 8–23)
CALCIUM SERPL-MCNC: 8.3 MG/DL (ref 8.3–10.4)
CHLORIDE SERPL-SCNC: 113 MMOL/L (ref 98–107)
CO2 SERPL-SCNC: 19 MMOL/L (ref 21–32)
CREAT SERPL-MCNC: 1.3 MG/DL (ref 0.8–1.5)
ERYTHROCYTE [DISTWIDTH] IN BLOOD BY AUTOMATED COUNT: 14.2 % (ref 11.9–14.6)
GLUCOSE SERPL-MCNC: 135 MG/DL (ref 65–100)
HCT VFR BLD AUTO: 38 % (ref 41.1–50.3)
HGB BLD-MCNC: 13.1 G/DL (ref 13.6–17.2)
MCH RBC QN AUTO: 32.4 PG (ref 26.1–32.9)
MCHC RBC AUTO-ENTMCNC: 34.5 G/DL (ref 31.4–35)
MCV RBC AUTO: 94.1 FL (ref 79.6–97.8)
MM INDURATION POC: 0 MM (ref 0–5)
PLATELET # BLD AUTO: 133 K/UL (ref 150–450)
PMV BLD AUTO: 10.7 FL (ref 10.8–14.1)
POTASSIUM SERPL-SCNC: 3.6 MMOL/L (ref 3.5–5.1)
PPD POC: NEGATIVE NEGATIVE
RBC # BLD AUTO: 4.04 M/UL (ref 4.23–5.67)
SODIUM SERPL-SCNC: 144 MMOL/L (ref 136–145)
WBC # BLD AUTO: 9.8 K/UL (ref 4.3–11.1)

## 2017-08-03 PROCEDURE — 76770 US EXAM ABDO BACK WALL COMP: CPT

## 2017-08-03 PROCEDURE — 94640 AIRWAY INHALATION TREATMENT: CPT

## 2017-08-03 PROCEDURE — 97530 THERAPEUTIC ACTIVITIES: CPT

## 2017-08-03 PROCEDURE — 74011000258 HC RX REV CODE- 258: Performed by: HOSPITALIST

## 2017-08-03 PROCEDURE — 36415 COLL VENOUS BLD VENIPUNCTURE: CPT | Performed by: HOSPITALIST

## 2017-08-03 PROCEDURE — 85027 COMPLETE CBC AUTOMATED: CPT | Performed by: HOSPITALIST

## 2017-08-03 PROCEDURE — 65270000029 HC RM PRIVATE

## 2017-08-03 PROCEDURE — 80048 BASIC METABOLIC PNL TOTAL CA: CPT | Performed by: HOSPITALIST

## 2017-08-03 PROCEDURE — 74011250637 HC RX REV CODE- 250/637: Performed by: HOSPITALIST

## 2017-08-03 PROCEDURE — 74011000250 HC RX REV CODE- 250: Performed by: NURSE PRACTITIONER

## 2017-08-03 PROCEDURE — 74011250636 HC RX REV CODE- 250/636: Performed by: HOSPITALIST

## 2017-08-03 RX ORDER — BUDESONIDE 0.5 MG/2ML
500 INHALANT ORAL
Status: DISCONTINUED | OUTPATIENT
Start: 2017-08-03 | End: 2017-08-22 | Stop reason: HOSPADM

## 2017-08-03 RX ORDER — IPRATROPIUM BROMIDE AND ALBUTEROL SULFATE 2.5; .5 MG/3ML; MG/3ML
3 SOLUTION RESPIRATORY (INHALATION)
Status: DISCONTINUED | OUTPATIENT
Start: 2017-08-03 | End: 2017-08-22

## 2017-08-03 RX ADMIN — Medication 10 ML: at 05:32

## 2017-08-03 RX ADMIN — Medication 5 ML: at 21:18

## 2017-08-03 RX ADMIN — SODIUM CHLORIDE 125 ML/HR: 900 INJECTION, SOLUTION INTRAVENOUS at 17:41

## 2017-08-03 RX ADMIN — PANTOPRAZOLE SODIUM 40 MG: 40 TABLET, DELAYED RELEASE ORAL at 05:32

## 2017-08-03 RX ADMIN — FINASTERIDE 5 MG: 5 TABLET, FILM COATED ORAL at 09:03

## 2017-08-03 RX ADMIN — ACETAMINOPHEN 650 MG: 325 TABLET ORAL at 04:46

## 2017-08-03 RX ADMIN — ASPIRIN 81 MG: 81 TABLET, COATED ORAL at 09:03

## 2017-08-03 RX ADMIN — FERROUS SULFATE TAB 325 MG (65 MG ELEMENTAL FE) 325 MG: 325 (65 FE) TAB at 05:32

## 2017-08-03 RX ADMIN — BUDESONIDE 500 MCG: 0.5 SUSPENSION RESPIRATORY (INHALATION) at 18:40

## 2017-08-03 RX ADMIN — LEVOTHYROXINE SODIUM 100 MCG: 100 TABLET ORAL at 05:32

## 2017-08-03 RX ADMIN — IPRATROPIUM BROMIDE AND ALBUTEROL SULFATE 3 ML: 2.5; .5 SOLUTION RESPIRATORY (INHALATION) at 18:40

## 2017-08-03 RX ADMIN — CEFTRIAXONE 1 G: 1 INJECTION, POWDER, FOR SOLUTION INTRAMUSCULAR; INTRAVENOUS at 21:13

## 2017-08-03 RX ADMIN — Medication 5 ML: at 14:00

## 2017-08-03 RX ADMIN — AZITHROMYCIN MONOHYDRATE 500 MG: 500 INJECTION, POWDER, LYOPHILIZED, FOR SOLUTION INTRAVENOUS at 21:13

## 2017-08-03 RX ADMIN — BACLOFEN 10 MG: 10 TABLET ORAL at 09:03

## 2017-08-03 RX ADMIN — DOCUSATE SODIUM 100 MG: 100 CAPSULE, LIQUID FILLED ORAL at 18:00

## 2017-08-03 RX ADMIN — ENOXAPARIN SODIUM 30 MG: 40 INJECTION SUBCUTANEOUS at 09:04

## 2017-08-03 RX ADMIN — BACLOFEN 10 MG: 10 TABLET ORAL at 18:00

## 2017-08-03 RX ADMIN — DOCUSATE SODIUM 100 MG: 100 CAPSULE, LIQUID FILLED ORAL at 09:03

## 2017-08-03 NOTE — PROGRESS NOTES
Bedside report  received from Kishan Leblanc, [address] [place] [address]. Assessment completed. Bed is in low and locked position with floor free of objects. Patient AxOx3, and resting quietly in bed. No needs noted at present. Respirations even and non labored. Verbalized understanding to call for needs. Call light within reach. Will continue to monitor pt.

## 2017-08-03 NOTE — PROGRESS NOTES
Audible wheezing heard while transfering pt back to bed from chair. Resp contacted.  Spoke with Saida-coming to the floor for eval. O2 sat 94%

## 2017-08-03 NOTE — PROGRESS NOTES
Problem: Mobility Impaired (Adult and Pediatric)  Goal: *Acute Goals and Plan of Care (Insert Text)  STG:  (1.)Mr. Scheyder will move from supine to sit and sit to supine , scoot up and down and roll side to side with CONTACT GUARD ASSIST within 3 day(s). (2.)Mr. Scheyder will transfer from bed to chair and chair to bed with CONTACT GUARD ASSIST using the least restrictive device within 3 day(s). (3.)Mr. Scheyder will ambulate with CONTACT GUARD ASSIST for 30 feet with the least restrictive device within 3 day(s). LTG:  (1.)Mr. Scheyder will move from supine to sit and sit to supine , scoot up and down and roll side to side in bed with MODIFIED INDEPENDENCE within 7 day(s). (2.)Mr. Scheyder will transfer from bed to chair and chair to bed with MODIFIED INDEPENDENCE using the least restrictive device within 7 day(s). (3.)Mr. Scheyder will ambulate with SUPERVISION for 150+ feet with the least restrictive device within 7 day(s). PHYSICAL THERAPY: Daily Note, Treatment Day: 1st and AM 8/3/2017  INPATIENT: Hospital Day: 3  Payor: Deedee Parson / Plan: [facility] HUMANA MEDICARE CHOICE PPO/PFFS / Product Type: [employer] Medicare /      NAME/AGE/GENDER: Ariela Sierra is a 80 y.o. male          PRIMARY DIAGNOSIS: SEPSIS  CAP (community acquired pneumonia) CAP (community acquired pneumonia) CAP (community acquired pneumonia)        ICD-10: Treatment Diagnosis:       · Generalized Muscle Weakness (M62.81)  · Difficulty in walking, Not elsewhere classified (R26.2)   Precaution/Allergies:  Review of patient's allergies indicates no known allergies. ASSESSMENT:      Mr. Mariajose Colunga was supine upon contact and agreeable to PT. Patient able to perform supine to sit with SBA and transfer to standing with CGA. Once standing patient able to increase gait distance to 61' with use of rolling walker, CGA-min assist and occasional cues for sequencing with rolling walker.  Patient returns to recliner chair to rest the reports he needs to use the bathroom. Patient able to ambulate to bathroom x 6' without assistive device requiring cues for improved safety when navigating into bathroom. Patient demonstrates fair static/dynamic standing balance during standing ADL activity. Patient returns to EOB and to supine with SBA. Overall good progress towards physical therapy goals as noted by increased gait distance. Will continue efforts. No goals have been met thus far. This section established at most recent assessment   PROBLEM LIST (Impairments causing functional limitations):  1. Decreased Strength  2. Decreased ADL/Functional Activities  3. Decreased Transfer Abilities  4. Decreased Ambulation Ability/Technique  5. Decreased Balance  6. Decreased Activity Tolerance  7. Decreased Pacing Skills  8. Increased Fatigue  9. Increased Shortness of Breath  10. Decreased [place] with Home Exercise Program    INTERVENTIONS PLANNED: (Benefits and precautions of physical therapy have been discussed with the patient.)  1. Balance Exercise  2. Bed Mobility  3. Family Education  4. Gait Training  5. Home Exercise Program (HEP)  6. Neuromuscular Re-education/Strengthening  7. Range of Motion (ROM)  8. Therapeutic Activites  9. Therapeutic Exercise/Strengthening  10. Transfer Training  11. Group Therapy      TREATMENT PLAN: Frequency/Duration: 3 times a week for duration of hospital stay  Rehabilitation Potential For Stated Goals: FAIR      RECOMMENDED REHABILITATION/EQUIPMENT: (at time of discharge pending progress): Continue Skilled Therapy and Rehab. HISTORY:   History of Present Injury/Illness (Reason for Referral):  See H&P below  81 y/o male with history of HTN, hypothyroidism, \"renal disorder\" and dementia is sent in transfer from [address] [place] [address] with diagnosis of UTI, fever, worsening renal failure, hypoxemia and URI/pna. He is alert but a poor historian. His main complaint is of shivering.  No family or visitors present to give more history. He states he lives with family. Workup at [facility] showed a UTI, temp 100.7, O2 sat 90% RA with PaO2 69. Cr up to 1.8 from 1.3. WBC ct 13.1, lactic acid 1.5 and . Chest xray was reported as normal. Do not know what patient's mental status baseline is to know if he is having delerium from acute illness or not. He was given IVF, rocephin and azithromycin and transferred to [address] [place] [address]. Patient denies chest pain, shortness of breath, nausea or vomiting. Has has malaise, weakness, cough and congestion. Past Medical History/Comorbidities:   Mr. Sawyer Floyd  has a past medical history of CAD (coronary artery disease). Mr. Sawyer Floyd  has a past surgical history that includes cardiac surg procedure unlist.  Social History/Living Environment:   Home Environment: Private residence  # Steps to Enter: 6  Rails to Enter: Yes  Hand Rails : Bilateral  One/Two Story Residence: One story  Living Alone: Yes  Support Systems: Child(jessica)  Patient Expects to be Discharged to[de-identified] Private residence  Current DME Used/Available at Home: Rom Miners, rollator, Jacqulyne Skeeters, straight, Shower chair  Tub or Shower Type: Tub/Shower combination  Prior Level of Function/Work/Activity:  Lives alone, use of rollator for gait, indep with ADLs, 0 falls. Number of Personal Factors/Comorbidities that affect the Plan of Care: 1-2: MODERATE COMPLEXITY   EXAMINATION:   Most Recent Physical Functioning:   Gross Assessment:                  Posture:     Balance:  Sitting: Intact  Standing: Impaired  Standing - Static: Fair  Standing - Dynamic : Fair Bed Mobility:  Supine to Sit: Stand-by asssistance; Additional time  Wheelchair Mobility:     Transfers:  Sit to Stand: Contact guard assistance  Stand to Sit: Contact guard assistance  Gait:     Base of Support: Narrowed  Speed/[place]: Slow;Shuffled  Step Length: Right shortened;Left shortened  Gait Abnormalities: Decreased step clearance;Trunk sway increased; Path deviations; Shuffling gait  Distance (ft): 60 Feet (ft)  Assistive Device: Walker, rolling  Ambulation - Level of Assistance: Minimal assistance;Contact guard assistance  Interventions: Safety awareness training; Tactile cues; Verbal cues       Body Structures Involved:  1. Heart  2. Lungs  3. Bones  4. Joints  5. Muscles Body Functions Affected:  1. Sensory/Pain  2. Cardio  3. Respiratory  4. Neuromusculoskeletal  5. Movement Related Activities and Participation Affected:  1. General Tasks and Demands  2. Mobility  3. Self Care  4. Domestic Life  5. Interpersonal Interactions and Relationships  6. Community, Social and [place]   Number of elements that affect the Plan of Care: 4+: HIGH COMPLEXITY   CLINICAL PRESENTATION:   Presentation: Evolving clinical presentation with changing clinical characteristics: MODERATE COMPLEXITY   CLINICAL DECISION MAKIN [facility] Mobility Inpatient Short Form  How much difficulty does the patient currently have. .. Unable A Lot A Little None   1. Turning over in bed (including adjusting bedclothes, sheets and blankets)? [ ] 1   [ ] 2   [X] 3   [ ] 4   2. Sitting down on and standing up from a chair with arms ( e.g., wheelchair, bedside commode, etc.)   [ ] 1   [X] 2   [ ] 3   [ ] 4   3. Moving from lying on back to sitting on the side of the bed? [ ] 1   [ ] 2   [X] 3   [ ] 4   How much help from another person does the patient currently need. .. Total A Lot A Little None   4. Moving to and from a bed to a chair (including a wheelchair)? [ ] 1   [X] 2   [ ] 3   [ ] 4   5. Need to walk in hospital room? [ ] 1   [X] 2   [ ] 3   [ ] 4   6. Climbing 3-5 steps with a railing? [X] 1   [ ] 2   [ ] 3   [ ] 4   © , Trustees of [address] [place] [address] [address] [id number], under license to [employer].  All rights reserved    Score:  Initial: 13 Most Recent: X (Date: -- )     Interpretation of Tool:  Represents activities that are increasingly more difficult (i.e. Bed mobility, Transfers, Gait). Score 24 23 22-20 19-15 14-10 9-7 6       Modifier CH CI CJ CK CL CM CN         · Mobility - Walking and Moving Around:               - CURRENT STATUS:    CL - 60%-79% impaired, limited or restricted               - GOAL STATUS:           CK - 40%-59% impaired, limited or restricted               - D/C STATUS:                       ---------------To be determined---------------  Payor: HUMANA MEDICARE / Plan: [facility] HUMANA MEDICARE CHOICE PPO/PFFS / Product Type: Managed Care Medicare /       Medical Necessity:     · Patient is expected to demonstrate progress in strength, range of motion, balance, coordination and functional technique to decrease assistance required with gait, transfers, and functional mobility. Reason for Services/Other Comments:  · Patient continues to require skilled intervention due to decreased strength, decreased balance, decreased functional tolerance, decreased cardiopulmonary endurance affecting participation in basic ADLs and functional tasks. Use of outcome tool(s) and clinical judgement create a POC that gives a: Questionable prediction of patient's progress: MODERATE COMPLEXITY                 TREATMENT:   (In addition to Assessment/Re-Assessment sessions the following treatments were rendered)   Pre-treatment Symptoms/Complaints:  No complaints  Pain: Initial:   Pain Intensity 1: 0  Post Session:  0/10      Therapeutic Activity: (    26 Minutes): Therapeutic activities including bed mobility training, transfer training from various surface heights, static/dynamic standing balance activities, ambulation on level ground, posture training, instruction in sequencing with rolling walker, and patient education to improve mobility, strength, balance and activity tolerance. Required moderate Safety awareness training; Tactile cues; Verbal cues to promote static and dynamic balance in standing and promote coordination of bilateral, lower extremity(s). Braces/Orthotics/Lines/Etc:   · IV  Treatment/Session Assessment:    · Response to Treatment:  See above  · Interdisciplinary Collaboration:  · Physical Therapy Assistant and Registered Nurse  · After treatment position/precautions:  · Supine in bed, Bed alarm/tab alert on, Bed/Chair-wheels locked, Bed in low position, Call light within reach and RN notified  · Compliance with Program/Exercises: Will assess as treatment progresses. · Recommendations/Intent for next treatment session: \"Next visit will focus on advancements to more challenging activities and reduction in assistance provided\".   Total Treatment Duration:PT Patient Time In/Time Out  Time In: 1340  Time Out: 120 Roxi Collins, JANE

## 2017-08-03 NOTE — PROGRESS NOTES
Received report from [place], [facility]. Pt is awake and watching. No c/o pain or distress. Will continue to monitor.

## 2017-08-03 NOTE — PROGRESS NOTES
Patient resting quietly in bed respirations are even and unlabored with no sign of distress noted at this time. Bedside report given to oncoming nurse, opportunity for questions given.

## 2017-08-03 NOTE — PROGRESS NOTES
Progress Note    8/3/2017  Admit Date: 2017 12:17 AM   NAME: Nathanael Sheerer Scheyder   :  10/18/1931   MRN:  [id number]   Attending: Keyon Hassan MD  PCP:  None  Treatment Team: Attending Provider: Keyon Hassan MD; Utilization Review: Eli Wallace RN; Care Manager: April García    Full Code   SUBJECTIVE:   Mr. Klaus Carr is a 81 yo male who presented as transfer from [address] [place] [address] with dx of UTI, fever, worsening renal failure, hypoxia, pneumonia (however CXR clear).  Found to have UTI, febrile at 100.7, hypoxic, leukocytosis and ARF at [address]  Pt started on rocephin and zithromycin and transferred DT. Pt febrile last night. On RA now. Reports feeling better. Does have minimal wheezing. Denies CP, SOB, n/v/d.        Past Medical History:   Diagnosis Date    CAD (coronary artery disease)      Recent Results (from the past 24 hour(s))   PLEASE READ & DOCUMENT PPD TEST IN 48 HRS    Collection Time: 17  4:49 AM   Result Value Ref Range    PPD negative Negative    mm Induration 0 mm   CBC W/O DIFF    Collection Time: 17  9:31 AM   Result Value Ref Range    WBC 9.8 4.3 - 11.1 K/uL    RBC 4.04 (L) 4.23 - 5.67 M/uL    HGB 13.1 (L) 13.6 - 17.2 g/dL    HCT 38.0 (L) 41.1 - 50.3 %    MCV 94.1 79.6 - 97.8 FL    MCH 32.4 26.1 - 32.9 PG    MCHC 34.5 31.4 - 35.0 g/dL    RDW 14.2 11.9 - 14.6 %    PLATELET 506 (L) 016 - 450 K/uL    MPV 10.7 (L) 10.8 - 75.8 FL   METABOLIC PANEL, BASIC    Collection Time: 17  9:31 AM   Result Value Ref Range    Sodium 144 136 - 145 mmol/L    Potassium 3.6 3.5 - 5.1 mmol/L    Chloride 113 (H) 98 - 107 mmol/L    CO2 19 (L) 21 - 32 mmol/L    Anion gap 12 7 - 16 mmol/L    Glucose 135 (H) 65 - 100 mg/dL    BUN 15 8 - 23 MG/DL    Creatinine 1.30 0.8 - 1.5 MG/DL    GFR est AA >60 >60 ml/min/1.73m2    GFR est non-AA 56 (L) >60 ml/min/1.73m2    Calcium 8.3 8.3 - 10.4 MG/DL     No Known Allergies  Current Facility-Administered Medications   Medication Dose Route Frequency Provider Last Rate Last Dose    0.9% sodium chloride infusion  125 mL/hr IntraVENous CONTINUOUS Jeri Mitchell  mL/hr at 08/02/17 2008 125 mL/hr at 08/02/17 2008    sodium chloride (NS) flush 5-10 mL  5-10 mL IntraVENous Q8H Jeri Mitchell MD   5 mL at 08/03/17 1400    sodium chloride (NS) flush 5-10 mL  5-10 mL IntraVENous PRN Jeri Mitchell MD        acetaminophen (TYLENOL) tablet 650 mg  650 mg Oral Q4H PRN Jeri Mitchell MD   650 mg at 08/03/17 0446    albuterol (PROVENTIL VENTOLIN) nebulizer solution 2.5 mg  2.5 mg Nebulization Q6H PRN Jeri Mitchell MD        ondansetron [facility]) injection 4 mg  4 mg IntraVENous Q4H PRN Jeri Mitchell MD        aspirin delayed-release tablet 81 mg  81 mg Oral DAILY Jeri Mitchell MD   81 mg at 08/03/17 9250    baclofen (LIORESAL) tablet 10 mg  10 mg Oral BID Jeri Mitchell MD   10 mg at 08/03/17 7812    docusate sodium (COLACE) capsule 100 mg  100 mg Oral BID Jeri Mitchell MD   100 mg at 08/03/17 8751    ferrous sulfate tablet 325 mg  325 mg Oral ACB Jeri Mitchell MD   325 mg at 08/03/17 0532    finasteride (PROSCAR) tablet 5 mg  5 mg Oral DAILY Jeri Mitchell MD   5 mg at 08/03/17 7356    levothyroxine (SYNTHROID) tablet 100 mcg  100 mcg Oral ACB Jeri Mitchell MD   100 mcg at 08/03/17 0532    nicotine (NICODERM CQ) 7 mg/24 hr patch 1 Patch  1 Patch TransDERmal Q24H Jeri Mitchell MD   1 Patch at 08/03/17 0420    pantoprazole (PROTONIX) tablet 40 mg  40 mg Oral ACB Jeri Mitchell MD   40 mg at 08/03/17 0532    azithromycin (ZITHROMAX) 500 mg in 0.9% sodium chloride (MBP/ADV) 250 mL  500 mg IntraVENous Q24H Jeri Mitchell  mL/hr at 08/02/17 2206 500 mg at 08/02/17 2206    cefTRIAXone (ROCEPHIN) 1 g in 0.9% sodium chloride (MBP/ADV) 50 mL MBP  1 g IntraVENous Q24H Jeri Mitchell  mL/hr at 08/02/17 2021 1 g at 08/02/17 2021    READ PPD  1 Each Other Q24H Jeri Mitchell MD   1 Each at 08/03/17 0400    enoxaparin (LOVENOX) injection 30 mg  30 mg SubCUTAneous Q24H Janny Mota MD   30 mg at 17 7420       Review of Systems negative with exception of pertinent positives noted above  PHYSICAL EXAM     Visit Vitals    /90 (BP 1 Location: Right arm)    Pulse 81    Temp 97.7 °F (36.5 °C)    Resp 18    Ht 5' 4\" (1.626 m)    Wt 67 kg (147 lb 9.6 oz)    SpO2 96%    BMI 25.34 kg/m2      Temp (24hrs), Av.5 °F (37.5 °C), Min:97.6 °F (36.4 °C), Max:102.2 °F (39 °C)    Oxygen Therapy  O2 Sat (%): 96 % (17 163)  O2 Device: Room air (17)  O2 Flow Rate (L/min): 3 l/min (17)    Intake/Output Summary (Last 24 hours) at 17 1708  Last data filed at 17 1328   Gross per 24 hour   Intake              540 ml   Output              100 ml   Net              440 ml      General:                 No acute distress    Lungs:                    mild scattered wheezing bilaterally. Resp even and nonlabored  Heart:                      S1S2 present without murmurs rubs gallops. RRR. No edema  Abdomen:              Soft, non tender, non distended. BS present  Extremities:           Moves ext spontaneously.  Strength 4/5. Neurologic:            No focal deficits. A/O X3    Results summary of Diagnostic Studies/Procedures copied from within [facility] EMR:         [address] Problems    Diagnosis Date Noted    CAP (community acquired pneumonia) 2017    Altered mental status 2017    UTI (urinary tract infection) 2017    Fever 2017     Plan:    CAP: continue rocephin, zithromax.  Add duoneb       Acute metabolic encephalopathy: improved, a/o X3      UTI: continue rocephin, tx per [facility] UA results, no urine cx sent      ARF: improved, continue IVF    Likely DC in AM      Notes, labs, VS, diagnostic testing reviewed  Time spent with pt 30 min      DVT Prophylaxis: lovenox  Plan of Care Discussed with: Supervising MD Dr. Marisol Wang, care team, pt   Deb Crain, NP

## 2017-08-03 NOTE — PROGRESS NOTES
Care Management Interventions  Transition of Care Consult (CM Consult): [address]: Yes  Physical Therapy Consult: Yes  Occupational Therapy Consult: Yes  Current Support Network: Lives Alone  Confirm Follow Up Transport: Self  Plan discussed with Pt/Family/Caregiver: Yes  Freedom of Choice Offered: Yes  Discharge Location  Discharge Placement: Home with home health  AL dc screening. Pt is alert and oriented in all spheres. Pt states he lives alone. Uses a rollator to ambulate. Independent with ADLs. Drives self to appts. No HH history. No str history. Pt doesn't have a pcp. SW will follow up with [facility] appointment line to get pt a pcp. Pt would benefit from [facility]. SW following.

## 2017-08-04 ENCOUNTER — APPOINTMENT (OUTPATIENT)
Dept: GENERAL RADIOLOGY | Age: 82
DRG: 853 | End: 2017-08-04
Attending: INTERNAL MEDICINE
Payer: MEDICARE

## 2017-08-04 ENCOUNTER — APPOINTMENT (OUTPATIENT)
Dept: CT IMAGING | Age: 82
DRG: 853 | End: 2017-08-04
Attending: INTERNAL MEDICINE
Payer: MEDICARE

## 2017-08-04 LAB
APPEARANCE UR: CLEAR
BACTERIA URNS QL MICRO: 0 /HPF
BILIRUB UR QL: NEGATIVE
CASTS URNS QL MICRO: ABNORMAL /LPF
COLOR UR: YELLOW
CREAT SERPL-MCNC: 1.3 MG/DL (ref 0.8–1.5)
EPI CELLS #/AREA URNS HPF: ABNORMAL /HPF
GLUCOSE UR STRIP.AUTO-MCNC: 100 MG/DL
HGB UR QL STRIP: ABNORMAL
KETONES UR QL STRIP.AUTO: NEGATIVE MG/DL
LEUKOCYTE ESTERASE UR QL STRIP.AUTO: ABNORMAL
NITRITE UR QL STRIP.AUTO: NEGATIVE
PH UR STRIP: 5.5 [PH] (ref 5–9)
PROT UR STRIP-MCNC: 30 MG/DL
RBC #/AREA URNS HPF: ABNORMAL /HPF
SP GR UR REFRACTOMETRY: 1.04 (ref 1–1.02)
UROBILINOGEN UR QL STRIP.AUTO: 0.2 EU/DL (ref 0.2–1)
WBC URNS QL MICRO: ABNORMAL /HPF

## 2017-08-04 PROCEDURE — 74011250637 HC RX REV CODE- 250/637: Performed by: HOSPITALIST

## 2017-08-04 PROCEDURE — 75635 CT ANGIO ABDOMINAL ARTERIES: CPT

## 2017-08-04 PROCEDURE — 36415 COLL VENOUS BLD VENIPUNCTURE: CPT | Performed by: INTERNAL MEDICINE

## 2017-08-04 PROCEDURE — 97530 THERAPEUTIC ACTIVITIES: CPT

## 2017-08-04 PROCEDURE — 74011000258 HC RX REV CODE- 258: Performed by: HOSPITALIST

## 2017-08-04 PROCEDURE — 87186 SC STD MICRODIL/AGAR DIL: CPT | Performed by: INTERNAL MEDICINE

## 2017-08-04 PROCEDURE — 82565 ASSAY OF CREATININE: CPT | Performed by: INTERNAL MEDICINE

## 2017-08-04 PROCEDURE — 65270000029 HC RM PRIVATE

## 2017-08-04 PROCEDURE — 94760 N-INVAS EAR/PLS OXIMETRY 1: CPT

## 2017-08-04 PROCEDURE — 74011636320 HC RX REV CODE- 636/320: Performed by: HOSPITALIST

## 2017-08-04 PROCEDURE — 87088 URINE BACTERIA CULTURE: CPT | Performed by: INTERNAL MEDICINE

## 2017-08-04 PROCEDURE — 74011000250 HC RX REV CODE- 250: Performed by: NURSE PRACTITIONER

## 2017-08-04 PROCEDURE — 77030011943

## 2017-08-04 PROCEDURE — 74011250636 HC RX REV CODE- 250/636: Performed by: HOSPITALIST

## 2017-08-04 PROCEDURE — 71010 XR CHEST SNGL V: CPT

## 2017-08-04 PROCEDURE — 94640 AIRWAY INHALATION TREATMENT: CPT

## 2017-08-04 PROCEDURE — 81001 URINALYSIS AUTO W/SCOPE: CPT | Performed by: INTERNAL MEDICINE

## 2017-08-04 PROCEDURE — 87040 BLOOD CULTURE FOR BACTERIA: CPT | Performed by: INTERNAL MEDICINE

## 2017-08-04 PROCEDURE — 87086 URINE CULTURE/COLONY COUNT: CPT | Performed by: INTERNAL MEDICINE

## 2017-08-04 PROCEDURE — 92610 EVALUATE SWALLOWING FUNCTION: CPT

## 2017-08-04 RX ORDER — SODIUM CHLORIDE 0.9 % (FLUSH) 0.9 %
10 SYRINGE (ML) INJECTION
Status: ACTIVE | OUTPATIENT
Start: 2017-08-04 | End: 2017-08-04

## 2017-08-04 RX ADMIN — ASPIRIN 81 MG: 81 TABLET, COATED ORAL at 08:36

## 2017-08-04 RX ADMIN — IPRATROPIUM BROMIDE AND ALBUTEROL SULFATE 3 ML: 2.5; .5 SOLUTION RESPIRATORY (INHALATION) at 11:50

## 2017-08-04 RX ADMIN — BACLOFEN 10 MG: 10 TABLET ORAL at 16:06

## 2017-08-04 RX ADMIN — IPRATROPIUM BROMIDE AND ALBUTEROL SULFATE 3 ML: 2.5; .5 SOLUTION RESPIRATORY (INHALATION) at 16:03

## 2017-08-04 RX ADMIN — DOCUSATE SODIUM 100 MG: 100 CAPSULE, LIQUID FILLED ORAL at 16:06

## 2017-08-04 RX ADMIN — Medication 5 ML: at 05:37

## 2017-08-04 RX ADMIN — LEVOTHYROXINE SODIUM 100 MCG: 100 TABLET ORAL at 05:38

## 2017-08-04 RX ADMIN — Medication 5 ML: at 14:00

## 2017-08-04 RX ADMIN — IPRATROPIUM BROMIDE AND ALBUTEROL SULFATE 3 ML: 2.5; .5 SOLUTION RESPIRATORY (INHALATION) at 04:24

## 2017-08-04 RX ADMIN — BUDESONIDE 500 MCG: 0.5 SUSPENSION RESPIRATORY (INHALATION) at 08:45

## 2017-08-04 RX ADMIN — FINASTERIDE 5 MG: 5 TABLET, FILM COATED ORAL at 08:36

## 2017-08-04 RX ADMIN — IPRATROPIUM BROMIDE AND ALBUTEROL SULFATE 3 ML: 2.5; .5 SOLUTION RESPIRATORY (INHALATION) at 08:45

## 2017-08-04 RX ADMIN — DOCUSATE SODIUM 100 MG: 100 CAPSULE, LIQUID FILLED ORAL at 08:36

## 2017-08-04 RX ADMIN — CEFTRIAXONE 1 G: 1 INJECTION, POWDER, FOR SOLUTION INTRAMUSCULAR; INTRAVENOUS at 20:27

## 2017-08-04 RX ADMIN — SODIUM CHLORIDE 125 ML/HR: 900 INJECTION, SOLUTION INTRAVENOUS at 08:12

## 2017-08-04 RX ADMIN — IPRATROPIUM BROMIDE AND ALBUTEROL SULFATE 3 ML: 2.5; .5 SOLUTION RESPIRATORY (INHALATION) at 00:10

## 2017-08-04 RX ADMIN — IPRATROPIUM BROMIDE AND ALBUTEROL SULFATE 3 ML: 2.5; .5 SOLUTION RESPIRATORY (INHALATION) at 20:00

## 2017-08-04 RX ADMIN — ENOXAPARIN SODIUM 30 MG: 40 INJECTION SUBCUTANEOUS at 08:36

## 2017-08-04 RX ADMIN — BACLOFEN 10 MG: 10 TABLET ORAL at 08:36

## 2017-08-04 RX ADMIN — PANTOPRAZOLE SODIUM 40 MG: 40 TABLET, DELAYED RELEASE ORAL at 05:38

## 2017-08-04 RX ADMIN — AZITHROMYCIN MONOHYDRATE 500 MG: 500 INJECTION, POWDER, LYOPHILIZED, FOR SOLUTION INTRAVENOUS at 21:55

## 2017-08-04 RX ADMIN — BUDESONIDE 500 MCG: 0.5 SUSPENSION RESPIRATORY (INHALATION) at 20:00

## 2017-08-04 RX ADMIN — IOPAMIDOL 125 ML: 755 INJECTION, SOLUTION INTRAVENOUS at 15:07

## 2017-08-04 RX ADMIN — Medication 5 ML: at 22:00

## 2017-08-04 RX ADMIN — ACETAMINOPHEN 650 MG: 325 TABLET ORAL at 04:12

## 2017-08-04 RX ADMIN — FERROUS SULFATE TAB 325 MG (65 MG ELEMENTAL FE) 325 MG: 325 (65 FE) TAB at 05:38

## 2017-08-04 NOTE — PROGRESS NOTES
Pt had an uneventful night. No issues or worries throughout the night. Call light is within reach. Will continue to monitor. Bedside report to be given to oncoming nurse.

## 2017-08-04 NOTE — PROGRESS NOTES
STG: Pt will tolerate regular textures/thin liquids without signs/sx of aspiration with 100% accuracy  STG: Pt will participate with trials of mixed consistencies to ensure tolerance x1  LTG: Pt will tolerate the least restrictive diet at discharge without respiratory compromise    Speech language pathology: bedside swallow note: Initial Assessment    NAME/AGE/GENDER: Efrain Alvarez is a 80 y.o. male  DATE: 8/4/2017  PRIMARY DIAGNOSIS: SEPSIS  CAP (community acquired pneumonia)       ICD-10: Treatment Diagnosis: dysphagia; oropharyngeal R13.12  INTERDISCIPLINARY COLLABORATION: Registered Nurse  PRECAUTIONS/ALLERGIES: Review of patient's allergies indicates no known allergies. ASSESSMENT:Based on the objective data described below, Mr. Klaus Carr presents with no overt signs/sx of aspiration with several trials of thin liquids via the cup or straw. Patient denies any difficulty swallowing and reports a good appetite. RN reports patient ate his breakfast sitting edge of bed without difficulty this am and has been taking his pills without incident. Patient was drinking coffee sitting up in the chair as ST entered. Tolerated regular textures with sips of thin liquids from the cup with normal mastication time and initiation of the swallow. Clear vocal quality without cough or throat clear. While eating the mixed consistencies patient demonstrated cough x1 toward the end of the entire 4oz container with a larger bite. Recommend continue current diet. Fully upright with small bites/sips. Will follow for diet tolerance/re-assessment with mixed consistenies x1. Patient will benefit from skilled intervention to address the below impairments. ?????? ? ? This section established at most recent assessment??????????  PROBLEM LIST (Impairments causing functional limitations):  1. dysphagia  REHABILITATION POTENTIAL FOR STATED GOALS: Good  PLAN OF CARE:   Patient will benefit from skilled intervention to address the following impairments. RECOMMENDATIONS AND PLANNED INTERVENTIONS (Benefits and precautions of therapy have been discussed with the patient.):  · continue prescribed diet  MEDICATIONS:  · With liquid  COMPENSATORY STRATEGIES/MODIFICATIONS INCLUDING:  · Small sips and bites  OTHER RECOMMENDATIONS (including follow up treatment recommendations): · Family training/education  · Patient education  · diet tolerance  RECOMMENDED DIET MODIFICATIONS DISCUSSED WITH:  · Family  · Patient  FREQUENCY/DURATION: Continue to follow patient 3 times a week for duration of hospital stay to address above goals. RECOMMENDED REHABILITATION/EQUIPMENT: (at time of discharge pending progress):   Rehab. SUBJECTIVE:   Cooperative. History of Present Injury/Illness: Mr. Jaylin Conti  has a past medical history of CAD (coronary artery disease). He also  has a past surgical history that includes cardiac surg procedure unlist.  Present Symptoms: cough with mixed x1  Pain Intensity 1: 0  Current Medications:   No current facility-administered medications on file prior to encounter. Current Outpatient Prescriptions on File Prior to Encounter   Medication Sig Dispense Refill    baclofen (LIORESAL) 10 mg tablet Take 10 mg by mouth two (2) times a day.  ferrous sulfate 325 mg (65 mg iron) tablet Take 325 mg by mouth Daily (before breakfast).  meloxicam (MOBIC) 7.5 mg tablet Take 7.5 mg by mouth daily.  hydroCHLOROthiazide (HYDRODIURIL) 25 mg tablet Take 25 mg by mouth daily.  omeprazole (PRILOSEC) 20 mg capsule Take 20 mg by mouth daily.  levothyroxine (SYNTHROID) 100 mcg tablet Take 100 mcg by mouth Daily (before breakfast).  finasteride (PROSCAR) 5 mg tablet Take 5 mg by mouth daily.  lisinopril (PRINIVIL, ZESTRIL) 40 mg tablet Take 40 mg by mouth daily.  docusate sodium (COLACE) 100 mg capsule Take 100 mg by mouth two (2) times a day.       nicotine (NICODERM CQ) 7 mg/24 hr 1 Patch by TransDERmal route every twenty-four (24) hours.  diclofenac (VOLTAREN) 1 % gel Apply  to affected area four (4) times daily. Current Dietary Status:  Regular textures      History of reflux:  no  Social History/Home Situation: home alone   Home Environment: Private residence  # Steps to Enter: 6  Rails to Enter: Yes  Hand Rails : Bilateral  One/Two Story Residence: One story  Living Alone: Yes  Support Systems: Child(jessica)  Patient Expects to be Discharged to[de-identified] Private residence  Current DME Used/Available at Home: Koleen Dakota, rollator, Reida Nipple, straight, Shower chair  Tub or Shower Type: Tub/Shower combination  OBJECTIVE:   Respiratory Status:  Room air     CXR Results: pending  MRI/CT Results:n/a  Oral Motor Structure/Speech:  Oral-Motor Structure/Motor Speech  Labial: No impairment  Dentition: Intact, Natural  Oral Hygiene: fair  Lingual: No impairment    Cognitive and Communication Status:  Neurologic State: Alert  Orientation Level: Oriented X4  Cognition: Appropriate for age attention/concentration  Perception: Appears intact  Perseveration: No perseveration noted  Safety/Judgement: Awareness of environment; Fall prevention;Home safety    BEDSIDE SWALLOW EVALUATION  Oral Assessment:  Oral Assessment  Labial: No impairment  Dentition: Intact; Natural  Oral Hygiene: fair  Lingual: No impairment  P.O. Trials:  Patient Position: upright in chair    The patient was given tsp to straw amounts of the following:   Consistency Presented: Thin liquid; Solid;Mixed consistency  How Presented: Cup/sip;Spoon;Straw;Self-fed/presented    ORAL PHASE:  Bolus Acceptance: No impairment  Bolus Formation/Control: No impairment  Propulsion: No impairment     Oral Residue: None    PHARYNGEAL PHASE:  Initiation of Swallow: No impairment  Laryngeal Elevation: Functional  Aspiration Signs/Symptoms: Strong cough  Vocal Quality: No impairment           Pharyngeal Phase Characteristics: No impairment, issues, or problems     OTHER OBSERVATIONS:  Rate/bite size: WNL   Endurance: WNL     Tool Used: Dysphagia Outcome and Severity Scale (KATINA)    Score Comments   Normal Diet  [] 7 With no strategies or extra time needed   Functional Swallow  [x] 6 May have mild oral or pharyngeal delay       Mild Dysphagia    [] 5 Which may require one diet consistency restricted (those who demonstrate penetration which is entirely cleared on MBS would be included)   Mild-Moderate Dysphagia  [] 4 With 1-2 diet consistencies restricted       Moderate Dysphagia  [] 3 With 2 or more diet consistencies restricted       Moderately Severe Dysphagia  [] 2 With partial PO strategies (trials with ST only)       Severe Dysphagia  [] 1 With inability to tolerate any PO safely          Score:  Initial: 6 Most Recent: X (Date: -- )   Interpretation of Tool: The Dysphagia Outcome and Severity Scale (KATINA) is a simple, easy-to-use, 7-point scale developed to systematically rate the functional severity of dysphagia based on objective assessment and make recommendations for diet level, independence level, and type of nutrition. Score 7 6 5 4 3 2 1   Modifier CH CI CJ CK CL CM CN   ?  Swallowing:     - CURRENT STATUS: CI - 1%-19% impaired, limited or restricted    - GOAL STATUS:  CH - 0% impaired, limited or restricted    - D/C STATUS:  ---------------To be determined---------------  Payor: [employer] MEDICARE / Plan: [facility] [place] MEDICARE CHOICE PPO/PFFS / Product Type: Managed Care Medicare /     TREATMENT:    (In addition to Assessment/Re-Assessment sessions the following treatments were rendered)  Assessment/Reassessment only, no treatment provided today  MODALITIES:                                                                    ORAL MOTOR  EXERCISES:                                                                                                                                                                      LARYNGEAL / PHARYNGEAL EXERCISES: __________________________________________________________________________________________________  Safety:   After treatment position/precautions:  · RN notified  · Upright in Bed  Progression/Medical Necessity:   · Skilled intervention continues to be required due to unable to attend/participate in therapy as expected. Compliance with Program/Exercises: Will assess as treatment progresses. Reason for Continuation of Services/Other Comments:  · Patient continues to require skilled intervention due to patient unable to attend/participate in therapy as expected. Recommendations/Intent for next treatment session: \"Treatment next visit will focus on diet tolerance; mixed trials\".     Total Treatment Duration:  Time In: 1150  Time Out: 659 Marielle HUSTON, CCC-SLP

## 2017-08-04 NOTE — PROGRESS NOTES
Patient stable with no complaints at this time. Patient sitting in recliner with no s/s of pain or discomfort. Call light within reach and patient instructed to call if assistance is needed.   Report to be given to oncoming RN 7p-7a

## 2017-08-04 NOTE — PROGRESS NOTES
Problem: Mobility Impaired (Adult and Pediatric)  Goal: *Acute Goals and Plan of Care (Insert Text)  STG:  (1.)Mr. Scheyder will move from supine to sit and sit to supine , scoot up and down and roll side to side with CONTACT GUARD ASSIST within 3 day(s). GOAL MET 8/4/2017  (2.)Mr. Scheyder will transfer from bed to chair and chair to bed with CONTACT GUARD ASSIST using the least restrictive device within 3 day(s). (3.)Mr. Scheyder will ambulate with CONTACT GUARD ASSIST for 30 feet with the least restrictive device within 3 day(s). GOAL MET 8/4/2017      LTG:  (1.)Mr. Scheyder will move from supine to sit and sit to supine , scoot up and down and roll side to side in bed with MODIFIED INDEPENDENCE within 7 day(s). (2.)Mr. Scheyder will transfer from bed to chair and chair to bed with MODIFIED INDEPENDENCE using the least restrictive device within 7 day(s). (3.)Mr. Scheyder will ambulate with SUPERVISION for 150+ feet with the least restrictive device within 7 day(s). PHYSICAL THERAPY: Daily Note, Treatment Day: 2nd and PM 8/4/2017  INPATIENT: Hospital Day: 4  Payor: Rosibel Barreto / Plan: [employer] MEDICARE CHOICE PPO/PFFS / Product Type: Managed Care Medicare /      NAME/AGE/GENDER: Juvenal Garg is a 80 y.o. male          PRIMARY DIAGNOSIS: SEPSIS  CAP (community acquired pneumonia) CAP (community acquired pneumonia) CAP (community acquired pneumonia)        ICD-10: Treatment Diagnosis:       · Generalized Muscle Weakness (M62.81)  · Difficulty in walking, Not elsewhere classified (R26.2)   Precaution/Allergies:  Review of patient's allergies indicates no known allergies. ASSESSMENT:      Mr. Lestine Cooks was sitting up in recliner chair upon contact and agreeable to PT. Patient able to perform  transfer to standing with CGA. Once standing patient able to increase gait distance to 150' with use of rolling walker, CGA and occasional cues for sequencing with rolling walker.  Patient returns to EOB and to supine with SBA with needs in reach and posey pad alarmed. Overall good progress towards physical therapy goals as noted by increased gait distance. Will continue efforts. Above goals in red have been met. This section established at most recent assessment   PROBLEM LIST (Impairments causing functional limitations):  1. Decreased Strength  2. Decreased ADL/Functional Activities  3. Decreased Transfer Abilities  4. Decreased Ambulation Ability/Technique  5. Decreased Balance  6. Decreased Activity Tolerance  7. Decreased Pacing Skills  8. Increased Fatigue  9. Increased Shortness of Breath  10. Decreased [place] with Home Exercise Program    INTERVENTIONS PLANNED: (Benefits and precautions of physical therapy have been discussed with the patient.)  1. Balance Exercise  2. Bed Mobility  3. Family Education  4. Gait Training  5. Home Exercise Program (HEP)  6. Neuromuscular Re-education/Strengthening  7. Range of Motion (ROM)  8. Therapeutic Activites  9. Therapeutic Exercise/Strengthening  10. Transfer Training  11. Group Therapy      TREATMENT PLAN: Frequency/Duration: 3 times a week for duration of hospital stay  Rehabilitation Potential For Stated Goals: FAIR      RECOMMENDED REHABILITATION/EQUIPMENT: (at time of discharge pending progress): Continue Skilled Therapy and Rehab. HISTORY:   History of Present Injury/Illness (Reason for Referral):  See H&P below  81 y/o male with history of HTN, hypothyroidism, \"renal disorder\" and dementia is sent in transfer from [facility] with diagnosis of UTI, fever, worsening renal failure, hypoxemia and URI/pna. He is alert but a poor historian. His main complaint is of shivering. No family or visitors present to give more history. He states he lives with family. Workup at [facility] showed a UTI, temp 100.7, O2 sat 90% RA with PaO2 69. Cr up to 1.8 from 1.3. WBC ct 13.1, lactic acid 1.5 and .  Chest xray was reported as normal. Do not know what patient's mental status baseline is to know if he is having delerium from acute illness or not. He was given IVF, rocephin and azithromycin and transferred to [address] [place] [address]. Patient denies chest pain, shortness of breath, nausea or vomiting. Has has malaise, weakness, cough and congestion. Past Medical History/Comorbidities:   Mr. Toni Barger  has a past medical history of CAD (coronary artery disease). Mr. Toni Barger  has a past surgical history that includes cardiac surg procedure unlist.  Social History/Living Environment:   Home Environment: Private residence  # Steps to Enter: 6  Rails to Enter: Yes  Hand Rails : Bilateral  One/Two Story Residence: One story  Living Alone: Yes  Support Systems: Child(jessica)  Patient Expects to be Discharged to[de-identified] Private residence  Current DME Used/Available at Home: Aimee Stack, rollator, Ofilia Jewel, straight, Shower chair  Tub or Shower Type: Tub/Shower combination  Prior Level of Function/Work/Activity:  Lives alone, use of rollator for gait, indep with ADLs, 0 falls. Number of Personal Factors/Comorbidities that affect the Plan of Care: 1-2: MODERATE COMPLEXITY   EXAMINATION:   Most Recent Physical Functioning:   Gross Assessment:                  Posture:     Balance:  Sitting: Intact  Standing: Impaired; With support  Standing - Static: Good  Standing - Dynamic : Fair Bed Mobility:  Sit to Supine: Stand-by asssistance  Wheelchair Mobility:     Transfers:  Sit to Stand: Contact guard assistance  Stand to Sit: Contact guard assistance  Gait:     Base of Support: Narrowed  Speed/[place]: Slow;Shuffled  Step Length: Right shortened;Left shortened  Gait Abnormalities: Decreased step clearance;Trunk sway increased; Path deviations  Distance (ft): 150 Feet (ft)  Assistive Device: Walker, rolling  Ambulation - Level of Assistance: Contact guard assistance  Interventions: Safety awareness training; Tactile cues; Verbal cues       Body Structures Involved:  1. Heart  2. Lungs  3. Bones  4. Joints  5. Muscles Body Functions Affected:  1. Sensory/Pain  2. Cardio  3. Respiratory  4. Neuromusculoskeletal  5. Movement Related Activities and Participation Affected:  1. General Tasks and Demands  2. Mobility  3. Self Care  4. Domestic Life  5. Interpersonal Interactions and Relationships  6. Community, Social and [place]   Number of elements that affect the Plan of Care: 4+: HIGH COMPLEXITY   CLINICAL PRESENTATION:   Presentation: Evolving clinical presentation with changing clinical characteristics: MODERATE COMPLEXITY   CLINICAL DECISION MAKIN [facility] Mobility Inpatient Short Form  How much difficulty does the patient currently have. .. Unable A Lot A Little None   1. Turning over in bed (including adjusting bedclothes, sheets and blankets)? [ ] 1   [ ] 2   [X] 3   [ ] 4   2. Sitting down on and standing up from a chair with arms ( e.g., wheelchair, bedside commode, etc.)   [ ] 1   [X] 2   [ ] 3   [ ] 4   3. Moving from lying on back to sitting on the side of the bed? [ ] 1   [ ] 2   [X] 3   [ ] 4   How much help from another person does the patient currently need. .. Total A Lot A Little None   4. Moving to and from a bed to a chair (including a wheelchair)? [ ] 1   [X] 2   [ ] 3   [ ] 4   5. Need to walk in hospital room? [ ] 1   [X] 2   [ ] 3   [ ] 4   6. Climbing 3-5 steps with a railing? [X] 1   [ ] 2   [ ] 3   [ ] 4   © , Trustees of [address] [place] [address], under license to [employer]. All rights reserved    Score:  Initial: 13 Most Recent: X (Date: -- )     Interpretation of Tool:  Represents activities that are increasingly more difficult (i.e. Bed mobility, Transfers, Gait).        Score 24 23 22-20 19-15 14-10 9-7 6       Modifier CH CI CJ CK CL CM CN         · Mobility - Walking and Moving Around:               - CURRENT STATUS:    CL - 60%-79% impaired, limited or restricted  - GOAL STATUS:           CK - 40%-59% impaired, limited or restricted               - D/C STATUS:                       ---------------To be determined---------------  Payor: HU[employer] MEDICARE / Plan: [facility] [employer] MEDICARE CHOICE PPO/PFFS / Product Type: Managed Care Medicare /       Medical Necessity:     · Patient is expected to demonstrate progress in strength, range of motion, balance, coordination and functional technique to decrease assistance required with gait, transfers, and functional mobility. Reason for Services/Other Comments:  · Patient continues to require skilled intervention due to decreased strength, decreased balance, decreased functional tolerance, decreased cardiopulmonary endurance affecting participation in basic ADLs and functional tasks. Use of outcome tool(s) and clinical judgement create a POC that gives a: Questionable prediction of patient's progress: MODERATE COMPLEXITY                 TREATMENT:   (In addition to Assessment/Re-Assessment sessions the following treatments were rendered)   Pre-treatment Symptoms/Complaints:  No complaints  Pain: Initial:   Pain Intensity 1: 0  Post Session:  0/10      Therapeutic Activity: (    15 Minutes): Therapeutic activities including bed mobility training, transfer training from various surface heights, static/dynamic standing balance activities, ambulation on level ground, posture training, instruction in sequencing with rolling walker, and patient education to improve mobility, strength, balance and activity tolerance. Required moderate Safety awareness training; Tactile cues; Verbal cues to promote static and dynamic balance in standing and promote coordination of bilateral, lower extremity(s).        Braces/Orthotics/Lines/Etc:   · IV  Treatment/Session Assessment:    · Response to Treatment:  See above  · Interdisciplinary Collaboration:  · Physical Therapy Assistant and Registered Nurse  · After treatment position/precautions:  · Supine in bed, Bed alarm/tab alert on, Bed/Chair-wheels locked, Bed in low position, Call light within reach and RN notified  · Compliance with Program/Exercises: Will assess as treatment progresses. · Recommendations/Intent for next treatment session: \"Next visit will focus on advancements to more challenging activities and reduction in assistance provided\".   Total Treatment Duration  PT Patient Time In/Time Out  Time In: 1317  Time Out: [id number] [facility]

## 2017-08-04 NOTE — PROGRESS NOTES
Pt is sitting up in bed watching television. Pt is alert and oriented. Respirations are even and oriented. No SOB or pain is reported at this time. No signs or symptoms of distress are noted. Call light is within reach. Will continue to monitor.

## 2017-08-04 NOTE — PROGRESS NOTES
Patient in bed resting with no complaints at this time. Patient is alert and orientated with no distress noted. IV intact and patent with no s/s of infection noted. Respirations even and unlabored with heart rate regular. Patient unable to ambulate independently without assistance; needs x1 r/t weakness. Bed in low locked position with call light within reach. Patient instructed to call if assistance is needed. Will continue to monitor.

## 2017-08-04 NOTE — PROGRESS NOTES
Patient voices no concerns at this time. Patient is relaxing in chair with no visual s/s of pain or discomfort. Call light within reach and patient instructed to call if assistance is needed. Will continue to monitor.

## 2017-08-04 NOTE — PROGRESS NOTES
Progress Note    Patient: Senthil Pan MRN: [id number]  SSN: [id number]    YOB: 1931  Age: 80 y.o. Sex: male      Admit Date: 8/1/2017    LOS: 3 days     Subjective:   Patient is feeling well. He has had low grade temperature 100F today. Yesterday morning it was 101.2F  His breathing is better today, although his SaO2 is 90%. No CP or cough. No N/V. No chills or diaphoresis. No diarrhea. No abdominal pain. Review of Systems:  Pertinent per HPI.     Medications:  Current Facility-Administered Medications   Medication Dose Route Frequency    albuterol-ipratropium (DUO-NEB) 2.5 MG-0.5 MG/3 ML  3 mL Nebulization Q4H RT    budesonide (PULMICORT) 500 mcg/2 ml nebulizer suspension  500 mcg Nebulization BID RT    0.9% sodium chloride infusion  125 mL/hr IntraVENous CONTINUOUS    sodium chloride (NS) flush 5-10 mL  5-10 mL IntraVENous Q8H    sodium chloride (NS) flush 5-10 mL  5-10 mL IntraVENous PRN    acetaminophen (TYLENOL) tablet 650 mg  650 mg Oral Q4H PRN    ondansetron (ZOFRAN) injection 4 mg  4 mg IntraVENous Q4H PRN    aspirin delayed-release tablet 81 mg  81 mg Oral DAILY    baclofen (LIORESAL) tablet 10 mg  10 mg Oral BID    docusate sodium (COLACE) capsule 100 mg  100 mg Oral BID    ferrous sulfate tablet 325 mg  325 mg Oral ACB    finasteride (PROSCAR) tablet 5 mg  5 mg Oral DAILY    levothyroxine (SYNTHROID) tablet 100 mcg  100 mcg Oral ACB    nicotine (NICODERM CQ) 7 mg/24 hr patch 1 Patch  1 Patch TransDERmal Q24H    pantoprazole (PROTONIX) tablet 40 mg  40 mg Oral ACB    azithromycin (ZITHROMAX) 500 mg in 0.9% sodium chloride (MBP/ADV) 250 mL  500 mg IntraVENous Q24H    cefTRIAXone (ROCEPHIN) 1 g in 0.9% sodium chloride (MBP/ADV) 50 mL MBP  1 g IntraVENous Q24H    enoxaparin (LOVENOX) injection 30 mg  30 mg SubCUTAneous Q24H       Objective:     Vitals:    08/04/17 0402 08/04/17 0424 08/04/17 0809 08/04/17 0845   BP: 156/72  105/55    Pulse: 95  95    Resp: 20  18    Temp: 100 °F (37.8 °C)  98.9 °F (37.2 °C)    SpO2: 93% 90% 90% 94%   Weight:       Height:            Physical Exam:   General: awake, alert, no apparent distress  HEENT: anicteric. PERRL  Lungs: Clear to auscultation bilaterally. No rales, no wheezes, no rhonchi. Breathing nonlabored. Heart: Regular rate and rhythm. Abdomen: Soft, nontender, nondistended. Bowel sounds normal.  No rebound tenderness, guarding, or rigidity. Extremities:  No LE edema. Skin: Warm/dry. No rashes or lesions. Neurologic: nonfocal  Psych: AA Ox3. Normal mood and affect. Lab/Data Review:  No results found for this or any previous visit (from the past 24 hour(s)). I have reviewed new clinical data. Assessment:     Principal Problem:    CAP (community acquired pneumonia) (8/1/2017)    Active Problems:    Altered mental status (8/1/2017)      UTI (urinary tract infection) (8/1/2017)      Fever (8/1/2017)          Plan:   # PNA likely bacterial, community acquired.  -No CXR seen in the system. Will order one.  -continue Ceftriaxone and Azithromycin.   -will obtain a sputum culture if he is able to expectorate  -obtain a blood culture if T > 100.4    # UTI  -no records of UA or urine culture.  -will obtain a UA and culture  -patient is on Ceftriaxone which will covered UTI    # Abdominal aortic aneurysm and thrombus  -as reported in the abdominal ultrasound  -will obtain a CTA of the abdomen to better characterize the thrombus and aneurysm. # Acute metabolic encephalopathy, secondary to sepsis, UTI and PNA  -continue management of underlying disorders. 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 -continue IV fluids, gently in preparation of IV iodine contrast exposure. # Hypothyroidism  -continue Synthroid.     # Risk stratification  -moderate    # DVT prophylaxis: Lovenox    # Disposition: Undetermined at this time. Most likely home with [facility] PT at discharge.      Signed By: Ronald Martinez MD     August 4, 2017

## 2017-08-04 NOTE — PROGRESS NOTES
Initial visit by  to convey care and concern and encourage patient that are services are available if desired. No needs were voiced during the visit. Provided business card for future reference.      Claudia Lozano Ace 68  Board Certified

## 2017-08-05 LAB
ANION GAP BLD CALC-SCNC: 10 MMOL/L (ref 7–16)
BUN SERPL-MCNC: 10 MG/DL (ref 8–23)
CALCIUM SERPL-MCNC: 8 MG/DL (ref 8.3–10.4)
CHLORIDE SERPL-SCNC: 113 MMOL/L (ref 98–107)
CO2 SERPL-SCNC: 23 MMOL/L (ref 21–32)
CREAT SERPL-MCNC: 1.11 MG/DL (ref 0.8–1.5)
CRP SERPL-MCNC: 13 MG/DL (ref 0–0.9)
GLUCOSE BLD STRIP.AUTO-MCNC: 129 MG/DL (ref 65–100)
GLUCOSE SERPL-MCNC: 107 MG/DL (ref 65–100)
POTASSIUM SERPL-SCNC: 3.4 MMOL/L (ref 3.5–5.1)
SODIUM SERPL-SCNC: 146 MMOL/L (ref 136–145)

## 2017-08-05 PROCEDURE — 74011250636 HC RX REV CODE- 250/636: Performed by: HOSPITALIST

## 2017-08-05 PROCEDURE — 65270000029 HC RM PRIVATE

## 2017-08-05 PROCEDURE — 74011000250 HC RX REV CODE- 250: Performed by: NURSE PRACTITIONER

## 2017-08-05 PROCEDURE — 74011000258 HC RX REV CODE- 258: Performed by: HOSPITALIST

## 2017-08-05 PROCEDURE — 82962 GLUCOSE BLOOD TEST: CPT

## 2017-08-05 PROCEDURE — 74011250637 HC RX REV CODE- 250/637: Performed by: HOSPITALIST

## 2017-08-05 PROCEDURE — 36415 COLL VENOUS BLD VENIPUNCTURE: CPT | Performed by: INTERNAL MEDICINE

## 2017-08-05 PROCEDURE — 86140 C-REACTIVE PROTEIN: CPT | Performed by: INTERNAL MEDICINE

## 2017-08-05 PROCEDURE — 94640 AIRWAY INHALATION TREATMENT: CPT

## 2017-08-05 PROCEDURE — 80048 BASIC METABOLIC PNL TOTAL CA: CPT | Performed by: INTERNAL MEDICINE

## 2017-08-05 PROCEDURE — 94760 N-INVAS EAR/PLS OXIMETRY 1: CPT

## 2017-08-05 RX ORDER — ENOXAPARIN SODIUM 100 MG/ML
40 INJECTION SUBCUTANEOUS EVERY 24 HOURS
Status: DISCONTINUED | OUTPATIENT
Start: 2017-08-06 | End: 2017-08-11

## 2017-08-05 RX ADMIN — Medication 5 ML: at 14:00

## 2017-08-05 RX ADMIN — IPRATROPIUM BROMIDE AND ALBUTEROL SULFATE 3 ML: 2.5; .5 SOLUTION RESPIRATORY (INHALATION) at 09:42

## 2017-08-05 RX ADMIN — BACLOFEN 10 MG: 10 TABLET ORAL at 08:15

## 2017-08-05 RX ADMIN — IPRATROPIUM BROMIDE AND ALBUTEROL SULFATE 3 ML: 2.5; .5 SOLUTION RESPIRATORY (INHALATION) at 21:54

## 2017-08-05 RX ADMIN — CEFTRIAXONE 1 G: 1 INJECTION, POWDER, FOR SOLUTION INTRAMUSCULAR; INTRAVENOUS at 21:16

## 2017-08-05 RX ADMIN — IPRATROPIUM BROMIDE AND ALBUTEROL SULFATE 3 ML: 2.5; .5 SOLUTION RESPIRATORY (INHALATION) at 16:14

## 2017-08-05 RX ADMIN — IPRATROPIUM BROMIDE AND ALBUTEROL SULFATE 3 ML: 2.5; .5 SOLUTION RESPIRATORY (INHALATION) at 04:15

## 2017-08-05 RX ADMIN — FINASTERIDE 5 MG: 5 TABLET, FILM COATED ORAL at 08:15

## 2017-08-05 RX ADMIN — AZITHROMYCIN MONOHYDRATE 500 MG: 500 INJECTION, POWDER, LYOPHILIZED, FOR SOLUTION INTRAVENOUS at 21:16

## 2017-08-05 RX ADMIN — Medication 5 ML: at 21:16

## 2017-08-05 RX ADMIN — IPRATROPIUM BROMIDE AND ALBUTEROL SULFATE 3 ML: 2.5; .5 SOLUTION RESPIRATORY (INHALATION) at 12:08

## 2017-08-05 RX ADMIN — BUDESONIDE 500 MCG: 0.5 SUSPENSION RESPIRATORY (INHALATION) at 09:42

## 2017-08-05 RX ADMIN — LEVOTHYROXINE SODIUM 100 MCG: 100 TABLET ORAL at 06:01

## 2017-08-05 RX ADMIN — IPRATROPIUM BROMIDE AND ALBUTEROL SULFATE 3 ML: 2.5; .5 SOLUTION RESPIRATORY (INHALATION) at 00:00

## 2017-08-05 RX ADMIN — BACLOFEN 10 MG: 10 TABLET ORAL at 16:20

## 2017-08-05 RX ADMIN — DOCUSATE SODIUM 100 MG: 100 CAPSULE, LIQUID FILLED ORAL at 16:20

## 2017-08-05 RX ADMIN — Medication 5 ML: at 06:00

## 2017-08-05 RX ADMIN — ASPIRIN 81 MG: 81 TABLET, COATED ORAL at 08:15

## 2017-08-05 RX ADMIN — PANTOPRAZOLE SODIUM 40 MG: 40 TABLET, DELAYED RELEASE ORAL at 06:01

## 2017-08-05 RX ADMIN — BUDESONIDE 500 MCG: 0.5 SUSPENSION RESPIRATORY (INHALATION) at 21:54

## 2017-08-05 RX ADMIN — FERROUS SULFATE TAB 325 MG (65 MG ELEMENTAL FE) 325 MG: 325 (65 FE) TAB at 06:01

## 2017-08-05 RX ADMIN — ENOXAPARIN SODIUM 30 MG: 40 INJECTION SUBCUTANEOUS at 08:15

## 2017-08-05 RX ADMIN — DOCUSATE SODIUM 100 MG: 100 CAPSULE, LIQUID FILLED ORAL at 08:14

## 2017-08-05 RX ADMIN — SODIUM CHLORIDE 125 ML/HR: 900 INJECTION, SOLUTION INTRAVENOUS at 08:12

## 2017-08-05 NOTE — PROGRESS NOTES
Patient voices no concerns at this time. Patient is resting in bed with eyes closed. Call light within reach and patient instructed to call if assistance is needed. Will continue to monitor.

## 2017-08-05 NOTE — PROGRESS NOTES
Pt is sitting up in recliner. Pt is alert and oriented. NS is infusing in left FA IV at 125 mL/hr. No SOB or pain is reported at this time. No signs of distress are noted. Call light is within reach. Will continue to monitor.

## 2017-08-05 NOTE — PROGRESS NOTES
Patient stable with no complaints at this time. Patient resting in recliner with no visual s/s of pain or discomfort. Call light within reach and patient instructed to call if assistance is needed.   Report to be given to oncoming RN 7p-7a

## 2017-08-05 NOTE — CONSULTS
[facility]   [address] [place] [address] , [address] [place] [id number]  Phone: [phone]  Fax: [phone]    Date of visit: 8/5/2017    Referring physician: No referring provider defined for this encounter. Reason for referral: ROSENDO Cook is a 80 y.o. male sent in consultation for No chief complaint on file. HPI: 81 y/o male with history of HTN, hypothyroidism, \"renal disorder\" and dementia is sent in transfer from [facility] with diagnosis of UTI, fever, worsening renal failure, hypoxemia and URI/pna. He is alert but a poor historian. His main complaint is of shivering. No family or visitors present to give more history. He states he lives with family. Workup at [facility] showed a UTI, temp 100.7, O2 sat 90% RA with PaO2 69. Cr up to 1.8 from 1.3. WBC ct 13.1, lactic acid 1.5 and . Chest xray was reported as normal. Do not know what patient's mental status baseline is to know if he is having delerium from acute illness or not. He was given IVF, rocephin and azithromycin and transferred to [address] [place] [address]. Patient denies chest pain, shortness of breath, nausea or vomiting. Has has malaise, weakness, cough and congestion. An ultrasound of the retroperitoneum was ordered to evaluate possible mechanical obstruction resulting in acute renal insufficiency. His abdominal aorta was incidentally found to measure 4.1 cm on the study. A follow-up CTA was performed which demonstrated his infrarenal aorta to be significantly larger measuring greater than 6 cm in maximum diameter. He denies any abdominal or back pain. He denies prior knowledge of the existence of his aneurysm. He is undergone coronary bypass many years ago and he cannot recall the details of this procedure. He denies any recent cardiovascular issues    His acute renal insufficiency appears to have now resolved. ROS:    Constitutional: Negative for fever and chills.    HENT: Negative for congestion and sore throat. Skin: Negative for rash and itching. Eyes: Negative for blurred vision and double vision. Respiratory: Negative for cough and shortness of breath. Cardiovascular: Negative for chest pain, palpitations, claudication and leg swelling. Gastrointestinal: Negative for nausea, vomiting and abdominal pain. Genitourinary: Negative for dysuria, hematuria and flank pain. Musculoskeletal: Negative for joint pain and falls. Neurological: Negative for dizziness, sensory change, focal weakness, loss of consciousness and headaches.      Medical history:   Past Medical History:   Diagnosis Date    CAD (coronary artery disease)        Surgical history:   Past Surgical History:   Procedure Laterality Date    CARDIAC SURG PROCEDURE UNLIST         Allergies: No Known Allergies    Current medications:   Current Facility-Administered Medications   Medication Dose Route Frequency    albuterol-ipratropium (DUO-NEB) 2.5 MG-0.5 MG/3 ML  3 mL Nebulization Q4H RT    budesonide (PULMICORT) 500 mcg/2 ml nebulizer suspension  500 mcg Nebulization BID RT    sodium chloride (NS) flush 5-10 mL  5-10 mL IntraVENous Q8H    sodium chloride (NS) flush 5-10 mL  5-10 mL IntraVENous PRN    acetaminophen (TYLENOL) tablet 650 mg  650 mg Oral Q4H PRN    ondansetron (ZOFRAN) injection 4 mg  4 mg IntraVENous Q4H PRN    aspirin delayed-release tablet 81 mg  81 mg Oral DAILY    baclofen (LIORESAL) tablet 10 mg  10 mg Oral BID    docusate sodium (COLACE) capsule 100 mg  100 mg Oral BID    ferrous sulfate tablet 325 mg  325 mg Oral ACB    finasteride (PROSCAR) tablet 5 mg  5 mg Oral DAILY    levothyroxine (SYNTHROID) tablet 100 mcg  100 mcg Oral ACB    nicotine (NICODERM CQ) 7 mg/24 hr patch 1 Patch  1 Patch TransDERmal Q24H    pantoprazole (PROTONIX) tablet 40 mg  40 mg Oral ACB    azithromycin (ZITHROMAX) 500 mg in 0.9% sodium chloride (MBP/ADV) 250 mL  500 mg IntraVENous Q24H    cefTRIAXone (ROCEPHIN) 1 g in 0.9% sodium chloride (MBP/ADV) 50 mL MBP  1 g IntraVENous Q24H    enoxaparin (LOVENOX) injection 30 mg  30 mg SubCUTAneous Q24H       Social history:   History   Smoking Status    Current Every Day Smoker   Smokeless Tobacco    Not on file                               History   Alcohol use Not on file       Imaging:     CTA of abdomen, pelvis and lower extremities.     INDICATION: Ultrasound found on renal ultrasound.           COMPARISON: Renal ultrasound 8/3/2017.     Contrast: 150 cc [id number].     FINDINGS: Axial images were performed from the diaphragm through the ankle  level. Multiplanar reformatting as well as 3-D reformatting were performed and  reviewed on a dedicated workstation. Automatic dose reduction protocol was used.     The abdominal aorta is patent. Below the takeoff of the renal arteries there is  enlargement of the abdominal aorta with a somewhat saccular component to the  right of the main flow lumen. The aneurysm measures 4.0 x 6.7 cm the aorta is of  relatively normal size and contour at the level of the iliac arteries. The SMA  and celiac axis are widely patent. There is a moderate stenosis of the proximal  right renal artery. There is a high-grade stenosis of the left renal artery. It  is patent but is high-grade.     The iliac arteries are patent. There is a moderate stenosis of the distal common  iliac artery.     The right lower extremity shows bulky plaque at the common femoral artery. There  is a moderate stenosis at this level. The SFA shows diffuse disc disease with  areas of moderate and high-grade stenoses. The popliteal artery is patent. There  is relatively good 2 vessel runoff to the level of the ankle via the peroneal  and anterior tibial arteries. The posterior tibial artery appears largely  occluded. Michelle graph the left lower extremity mild stenosis of the common femoral  artery. The SFA is occluded proximally. The profunda femoris artery is patent.   The popliteal artery reconstitutes. There is relatively good 2 vessel runoff to  the level of the ankle via the peroneal artery and anterior tibial artery.     Upon review of the source images the lungs show chronic appearing changes in the  bases. In addition there are bilateral pleural effusions right being larger than  the left. The liver shows no gross masses. The gallbladder is relatively small  with a single appearing radiopaque stone measuring approximately 9 mm in  diameter. The spleen and pancreas show no abnormal masses. The adrenal glands  and kidneys show no abnormal masses. There are simple cyst of the left kidney  measuring 2.5 cm in diameter. The bowel is unopacified as per CTA protocol. The  bones are generally osteopenic. The spine shows mild degenerative change.     IMPRESSION  IMPRESSION:  1. Infrarenal abdominal aortic aneurysm with saccular component measuring 4.0, x  6.7 cm. 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 2. High-grade left renal artery stenosis. 3. Right lower extremity: Moderate stenosis of the common femoral artery and  diffuse high-grade disease of SFA. 4. Left lower extremity: Occlusion of SFA reconstitution at the popliteal artery  level. 5. Bilateral pleural effusions. Cholelithiasis.          Vitals:   Vitals:    08/05/17 0415 08/05/17 0757 08/05/17 0944 08/05/17 1210   BP:  144/81     BP 1 Location:  Right arm     BP Patient Position:       Pulse:  66     Resp:  18     Temp:  98 °F (36.7 °C)     SpO2: 93% 93% 95% 95%   Weight:       Height:           Physical exam:  Visit Vitals    /81 (BP 1 Location: Right arm)    Pulse 66    Temp 98 °F (36.7 °C)    Resp 18    Ht 5' 4\" (1.626 m)    Wt 147 lb 9.6 oz (67 kg)    SpO2 95%    BMI 25.34 kg/m2     General appearance: alert, cooperative, no distress, appears stated age  Neck: supple, symmetrical, trachea midline, no adenopathy, thyroid: not enlarged, symmetric, no tenderness/mass/nodules, no carotid bruit and no JVD  Lungs: clear to auscultation bilaterally  Heart: regular rate and rhythm, S1, S2 normal, no murmur, click, rub or gallop  Abdomen: soft, non-tender. Bowel sounds normal. No masses,  no organomegaly, his AAA is easily palpable. It is nontender. Extremities: extremities normal, atraumatic, no cyanosis or edema  Skin: Skin color, texture, turgor normal. No rashes or lesions  Neurologic: Grossly normal  Vascular Exam:    Right:    LEFT:                      Radial: 2+    Radial: 2+         Brachial: 2+   Brachial: 2+          Femoral: 2+   Femoral: 2+         Popliteal: 2+   Popliteal: 2+         DP: 2+    DP: 2+         PT: 2+    PT: 2+         Carotid Bruit: No   Carotid Bruit: No    Impression: No diagnosis found. Plan:     Large (greater than 6 cm) infrarenal abdominal aortic aneurysm. I believe his anatomy is receptive for an endovascular repair. I discussed this with him in detail today. I would recommend waiting until he has completed his treatment for his UTI and has no further evidence of infection. His renal insufficiency appears to have resolved satisfactorily. I will also ask cardiology to see him for preoperative cardiac risk evaluation. Approximately 45 minutes  was spent in direct patient contact during this encounter including 50% of which was spend in patient education, counseling, review of medical history and imaging, and medical decision making. Yusra Figueroa MD    Elements of this note have been dictated using speech recognition software. As a result, errors of speech recognition may have occurred.

## 2017-08-05 NOTE — PROGRESS NOTES
Patient in bed resting with no complaints at this time. Patient is alert and orientated with no distress noted. IV intact and patent with no s/s of infection noted. Respirations even and unlabored with heart rate regular. Patient unable to ambulate independently without assistance; needs x1 r/t weakness. Bed in low locked position with all light within reach. Patient instructed to call if assistance is needed. Will continue to monitor.

## 2017-08-06 LAB
ERYTHROCYTE [DISTWIDTH] IN BLOOD BY AUTOMATED COUNT: 14.2 % (ref 11.9–14.6)
HCT VFR BLD AUTO: 31.8 % (ref 41.1–50.3)
HGB BLD-MCNC: 11 G/DL (ref 13.6–17.2)
MCH RBC QN AUTO: 30.9 PG (ref 26.1–32.9)
MCHC RBC AUTO-ENTMCNC: 34.6 G/DL (ref 31.4–35)
MCV RBC AUTO: 89.3 FL (ref 79.6–97.8)
PLATELET # BLD AUTO: 204 K/UL (ref 150–450)
PMV BLD AUTO: 10.5 FL (ref 10.8–14.1)
RBC # BLD AUTO: 3.56 M/UL (ref 4.23–5.67)
WBC # BLD AUTO: 7.9 K/UL (ref 4.3–11.1)

## 2017-08-06 PROCEDURE — 85027 COMPLETE CBC AUTOMATED: CPT | Performed by: INTERNAL MEDICINE

## 2017-08-06 PROCEDURE — 74011250636 HC RX REV CODE- 250/636: Performed by: INTERNAL MEDICINE

## 2017-08-06 PROCEDURE — 74011000258 HC RX REV CODE- 258: Performed by: INTERNAL MEDICINE

## 2017-08-06 PROCEDURE — 74011250637 HC RX REV CODE- 250/637: Performed by: INTERNAL MEDICINE

## 2017-08-06 PROCEDURE — 74011250636 HC RX REV CODE- 250/636: Performed by: HOSPITALIST

## 2017-08-06 PROCEDURE — 77010033678 HC OXYGEN DAILY

## 2017-08-06 PROCEDURE — 94760 N-INVAS EAR/PLS OXIMETRY 1: CPT

## 2017-08-06 PROCEDURE — 94640 AIRWAY INHALATION TREATMENT: CPT

## 2017-08-06 PROCEDURE — 74011000250 HC RX REV CODE- 250: Performed by: NURSE PRACTITIONER

## 2017-08-06 PROCEDURE — 74011250637 HC RX REV CODE- 250/637: Performed by: HOSPITALIST

## 2017-08-06 PROCEDURE — 65270000029 HC RM PRIVATE

## 2017-08-06 PROCEDURE — 87040 BLOOD CULTURE FOR BACTERIA: CPT | Performed by: INTERNAL MEDICINE

## 2017-08-06 PROCEDURE — 36415 COLL VENOUS BLD VENIPUNCTURE: CPT | Performed by: INTERNAL MEDICINE

## 2017-08-06 RX ORDER — ATORVASTATIN CALCIUM 40 MG/1
40 TABLET, FILM COATED ORAL
Status: DISCONTINUED | OUTPATIENT
Start: 2017-08-06 | End: 2017-08-22 | Stop reason: HOSPADM

## 2017-08-06 RX ORDER — FUROSEMIDE 20 MG/1
20 TABLET ORAL DAILY
Status: DISCONTINUED | OUTPATIENT
Start: 2017-08-07 | End: 2017-08-09

## 2017-08-06 RX ORDER — METOPROLOL TARTRATE 25 MG/1
25 TABLET, FILM COATED ORAL EVERY 12 HOURS
Status: DISCONTINUED | OUTPATIENT
Start: 2017-08-06 | End: 2017-08-22 | Stop reason: HOSPADM

## 2017-08-06 RX ADMIN — ENOXAPARIN SODIUM 40 MG: 40 INJECTION SUBCUTANEOUS at 09:46

## 2017-08-06 RX ADMIN — DOCUSATE SODIUM 100 MG: 100 CAPSULE, LIQUID FILLED ORAL at 16:18

## 2017-08-06 RX ADMIN — FERROUS SULFATE TAB 325 MG (65 MG ELEMENTAL FE) 325 MG: 325 (65 FE) TAB at 05:43

## 2017-08-06 RX ADMIN — IPRATROPIUM BROMIDE AND ALBUTEROL SULFATE 3 ML: 2.5; .5 SOLUTION RESPIRATORY (INHALATION) at 16:34

## 2017-08-06 RX ADMIN — METHYLPREDNISOLONE SODIUM SUCCINATE 20 MG: 40 INJECTION, POWDER, FOR SOLUTION INTRAMUSCULAR; INTRAVENOUS at 21:14

## 2017-08-06 RX ADMIN — BACLOFEN 10 MG: 10 TABLET ORAL at 16:18

## 2017-08-06 RX ADMIN — Medication 5 ML: at 05:42

## 2017-08-06 RX ADMIN — IPRATROPIUM BROMIDE AND ALBUTEROL SULFATE 3 ML: 2.5; .5 SOLUTION RESPIRATORY (INHALATION) at 08:33

## 2017-08-06 RX ADMIN — IPRATROPIUM BROMIDE AND ALBUTEROL SULFATE 3 ML: 2.5; .5 SOLUTION RESPIRATORY (INHALATION) at 11:54

## 2017-08-06 RX ADMIN — AMPICILLIN SODIUM 2 G: 2 INJECTION, POWDER, FOR SOLUTION INTRAMUSCULAR; INTRAVENOUS at 12:30

## 2017-08-06 RX ADMIN — AMPICILLIN SODIUM 2 G: 2 INJECTION, POWDER, FOR SOLUTION INTRAMUSCULAR; INTRAVENOUS at 17:38

## 2017-08-06 RX ADMIN — IPRATROPIUM BROMIDE AND ALBUTEROL SULFATE 3 ML: 2.5; .5 SOLUTION RESPIRATORY (INHALATION) at 00:39

## 2017-08-06 RX ADMIN — PANTOPRAZOLE SODIUM 40 MG: 40 TABLET, DELAYED RELEASE ORAL at 05:43

## 2017-08-06 RX ADMIN — Medication 10 ML: at 09:46

## 2017-08-06 RX ADMIN — METOPROLOL TARTRATE 25 MG: 25 TABLET ORAL at 21:14

## 2017-08-06 RX ADMIN — DOCUSATE SODIUM 100 MG: 100 CAPSULE, LIQUID FILLED ORAL at 09:45

## 2017-08-06 RX ADMIN — BUDESONIDE 500 MCG: 0.5 SUSPENSION RESPIRATORY (INHALATION) at 08:33

## 2017-08-06 RX ADMIN — LEVOTHYROXINE SODIUM 100 MCG: 100 TABLET ORAL at 05:43

## 2017-08-06 RX ADMIN — BUDESONIDE 500 MCG: 0.5 SUSPENSION RESPIRATORY (INHALATION) at 21:46

## 2017-08-06 RX ADMIN — FINASTERIDE 5 MG: 5 TABLET, FILM COATED ORAL at 09:45

## 2017-08-06 RX ADMIN — IPRATROPIUM BROMIDE AND ALBUTEROL SULFATE 3 ML: 2.5; .5 SOLUTION RESPIRATORY (INHALATION) at 03:18

## 2017-08-06 RX ADMIN — METHYLPREDNISOLONE SODIUM SUCCINATE 20 MG: 40 INJECTION, POWDER, FOR SOLUTION INTRAMUSCULAR; INTRAVENOUS at 12:30

## 2017-08-06 RX ADMIN — BACLOFEN 10 MG: 10 TABLET ORAL at 09:45

## 2017-08-06 RX ADMIN — ASPIRIN 81 MG: 81 TABLET, COATED ORAL at 09:45

## 2017-08-06 RX ADMIN — Medication 10 ML: at 21:21

## 2017-08-06 RX ADMIN — ATORVASTATIN CALCIUM 40 MG: 40 TABLET, FILM COATED ORAL at 21:14

## 2017-08-06 RX ADMIN — IPRATROPIUM BROMIDE AND ALBUTEROL SULFATE 3 ML: 2.5; .5 SOLUTION RESPIRATORY (INHALATION) at 21:46

## 2017-08-06 RX ADMIN — METOPROLOL TARTRATE 25 MG: 25 TABLET ORAL at 12:30

## 2017-08-06 NOTE — PROGRESS NOTES
Assumed care of patient. Assessment completed and documented, see docflow. Patient alert to person and place. Patient assist x 1; assisted to bedside recliner; then patient requested to return to bed. NAD noted at present. Respirations even and non labored. Bed alarm in place for patient safety. Call light within reach. Door open, will continue to monitor.

## 2017-08-06 NOTE — PROGRESS NOTES
Progress Note    Patient: Gilberto Gallegos MRN: [id number]  SSN: [id number]    YOB: 1931  Age: 80 y.o. Sex: male      Admit Date: 8/1/2017    LOS: 5 days     Subjective:   Patient is not feeling well today. No fevers today. He feels short of breath and have a dry cough. He also complaining of chest tightness. No N/V. No chills or diaphoresis. No diarrhea. No abdominal pain. Review of Systems:  Pertinent per HPI.     Medications:  Current Facility-Administered Medications   Medication Dose Route Frequency    enoxaparin (LOVENOX) injection 40 mg  40 mg SubCUTAneous Q24H    albuterol-ipratropium (DUO-NEB) 2.5 MG-0.5 MG/3 ML  3 mL Nebulization Q4H RT    budesonide (PULMICORT) 500 mcg/2 ml nebulizer suspension  500 mcg Nebulization BID RT    sodium chloride (NS) flush 5-10 mL  5-10 mL IntraVENous Q8H    sodium chloride (NS) flush 5-10 mL  5-10 mL IntraVENous PRN    acetaminophen (TYLENOL) tablet 650 mg  650 mg Oral Q4H PRN    ondansetron (ZOFRAN) injection 4 mg  4 mg IntraVENous Q4H PRN    aspirin delayed-release tablet 81 mg  81 mg Oral DAILY    baclofen (LIORESAL) tablet 10 mg  10 mg Oral BID    docusate sodium (COLACE) capsule 100 mg  100 mg Oral BID    ferrous sulfate tablet 325 mg  325 mg Oral ACB    finasteride (PROSCAR) tablet 5 mg  5 mg Oral DAILY    levothyroxine (SYNTHROID) tablet 100 mcg  100 mcg Oral ACB    nicotine (NICODERM CQ) 7 mg/24 hr patch 1 Patch  1 Patch TransDERmal Q24H    pantoprazole (PROTONIX) tablet 40 mg  40 mg Oral ACB    azithromycin (ZITHROMAX) 500 mg in 0.9% sodium chloride (MBP/ADV) 250 mL  500 mg IntraVENous Q24H    cefTRIAXone (ROCEPHIN) 1 g in 0.9% sodium chloride (MBP/ADV) 50 mL MBP  1 g IntraVENous Q24H       Objective:     Vitals:    08/06/17 0320 08/06/17 0348 08/06/17 0824 08/06/17 0833   BP:  165/68 173/65    Pulse:  94 85    Resp:  20 19    Temp:  97.9 °F (36.6 °C) 97.6 °F (36.4 °C)    SpO2: 97% 97% 99% 99%   Weight:       Height: Physical Exam:   General: awake, alert, no apparent distress  HEENT: anicteric. PERRL  Lungs: Clear to auscultation bilaterally. Bibasilar rales. Scattered expiratory wheezes in both lungs. No rhonchi. Breathing is mildly labored. Heart: Regular rate and rhythm. Abdomen: Soft, nontender, nondistended. Bowel sounds normal.  No rebound tenderness, guarding, or rigidity. Extremities:  No LE edema. Skin: Warm/dry. No rashes or lesions. Neurologic: nonfocal  Psych: AA Ox3. Normal mood and affect. Lab/Data Review:  Recent Results (from the past 24 hour(s))   GLUCOSE, POC    Collection Time: 08/05/17 11:33 AM   Result Value Ref Range    Glucose (POC) 129 (H) 65 - 100 mg/dL   CBC W/O DIFF    Collection Time: 08/06/17  6:43 AM   Result Value Ref Range    WBC 7.9 4.3 - 11.1 K/uL    RBC 3.56 (L) 4.23 - 5.67 M/uL    HGB 11.0 (L) 13.6 - 17.2 g/dL    HCT 31.8 (L) 41.1 - 50.3 %    MCV 89.3 79.6 - 97.8 FL    MCH 30.9 26.1 - 32.9 PG    MCHC 34.6 31.4 - 35.0 g/dL    RDW 14.2 11.9 - 14.6 %    PLATELET 569 403 - 604 K/uL    MPV 10.5 (L) 10.8 - 14.1 FL     I have reviewed new clinical data. Assessment and Plan:     BRIEF HOSPITAL COURSE    81 y/o male with history of HTN, hypothyroidism, \"renal disorder\", chronic tobacco abuse and dementia was sent in transfer from [address] [place] [address] with diagnosis of UTI, fever, worsening renal failure, hypoxemia and URI/PNA. His main complain was shivering. Workup at [address] [place] [address] showed a UTI, temp 100.7, O2 sat 90% RA with PaO2 69. Cr up to 1.8 from 1.3. WBC ct 13.1, lactic acid 1.5 and . Chest xray was reported as normal.   The patient was delirious and confused. Not a very good historian. He was given IVF, rocephin and azithromycin and transferred to [facility]. Upon arrival he was complaining of malaise, weakness, cough and congestion. During his hospital stay, he was continued on Ceftriaxone and Azithromycin. Blood cultures x2 obtain, NGTD.  Urine culture growing Gram Positive Cocci, small amount. Sepsis slowly and gradually improved. Because of his history of \"kidney disorder\" and U/S of the retroperitoneum was done. Incidentally he was found to have an infrarenal AAA of approximately 4 cm with a thrombus. A CTA runoff was done and showed that the aneurysm was actually of approximately 6 cm. Vascular surgery was consulted and currently in process for AAA repair once he is cleared by cardiology and ID. Patient's AAA is completely asymptomatic. It seems to have an intramural thrombus, but will need to check with the radiologist.           # PNA likely bacterial, community acquired. -CXR negative for acute infiltrates. Chronic changes at the bases. CT reported bilateral pleural effusions.  -continue Ceftriaxone. Will discontinue Azithromycin today after 5 days of Rx.   -will obtain a sputum culture if he is able to expectorate  -obtain a blood culture if T > 100.4    # UTI  -no records of UA or urine culture. -UA and culture obtained after he was started on antibiotics. -patient is on Ceftriaxone which will cover UTI  -Urine culture growing Gram Positive Cocci. F/u final report    # Abdominal aortic aneurysm and thrombus  -as reported in the abdominal ultrasound  -CTA of the abdomen revealed an enlarged AAA, no thrombus reported. Also noted high-grade left renal artery stenosis. -vascular surgery consulted. Case discussed with Dr. Frantz Myers. Patient to undergo AAA repair once cleared by cardiology and ID. # Acute metabolic encephalopathy, secondary to sepsis, UTI and PNA  -seems to have improved. Probably the patient is at his baseline mentation  -continue management of underlying disorders. # Sepsis, secondary to PNA and UTI  -patient met criteria for sepsis in light of T = 102.2, encephalopathy, hypoxia SaO2 90%, WBC 11.4  -continue treatment for PNA and UTI  -currently his sepsis is improved.     # Suspect COPD exacerbation  -patient has some wheezing today, he is short of breath and coughing. He has longstanding history of smoking  -will continue Duonebs. -will start him on Solumedrol at 20mg BID    # Acute kidney injury on CKD stage II  -improved. -continue IV fluids, gently in preparation of IV iodine contrast exposure. # Hypothyroidism  -continue Synthroid. # Risk stratification  -High due to AAA, renal stenosis, and high medical complexity    # DVT prophylaxis:   -Lovenox    # Disposition: Undetermined at this time.     Signed By: Sherie Winn MD     August 6, 2017

## 2017-08-06 NOTE — PROGRESS NOTES
No complaints  Afebrile  Denies abdominal pain  Planning for endovascular AAA repair. We will ask cardiology to see on Monday.

## 2017-08-06 NOTE — CONSULTS
Infectious Disease Consult    Today's Date: 8/6/2017   Admit Date: 8/1/2017    Impression:   · Enterococcus UTI (7/31 - UCX at [facility])  · Delirium  · Fever  · Large AAA requiring urgent repair  · Acute on chronic kidney disease    Plan:   · Since he is about to receive an endograft I think we should fully treat his Enterococcus UTI prior to surgery. Ceftriaxone unfortunately does not treat Enterococcus. Enterococcus is prone to causing endovascular infection so we should be aggressive in treating it prior to surgery. · Recommend IV ampicillin for total 7 day course prior to surgery. This can be changed to amoxicillin 500mg TID if the decision is made to discharge and readmit for surgery. · Recommend operative prophylaxis at the time of surgery that covers the organism - would add IV vancomycin to routine cefazolin for operative prophylaxis. · ID will not continue to follow the patient. Please re-consult us for any additional questions or concerns. Anti-infectives:     Inpat Anti-Infectives     Start     Ordered Stop    08/01/17 2000  cefTRIAXone (ROCEPHIN) 1 g in 0.9% sodium chloride (MBP/ADV) 50 mL MBP  1 g,   IntraVENous,   EVERY 24 HOURS      08/01/17 0242 --          Subjective:   Date of Consultation:  August 6, 2017  Referring Physician: Dr. Deisy Pablo    Patient is a 80 y.o. male Who presented to [facility] on August 1 with fever and hypoxia. The patient has a history of dementia and reportedly lives at home with his family. He was brought to the emergency room at [facility] on July 31 with low-grade fever. He was also noted to have some hypoxia which improved with a breathing treatment. Patient was noted to have a leukocytosis with acute kidney injury. He was given Rocephin and azithromycin and transferred to [facility] due to his insurance status.  Blood cultures were obtained in the emergency room at [facility] which were negative, but his urine culture was positive for greater than 100,000 copies of Enterococcus faecalis which is ampicillin sensitive. Patient has been treated with ceftriaxone and azithromycin since [place] [facility] His chest imaging showed no evidence of pneumonia. Due to his acute kidney injury a renal ultrasound was done which showed no evidence of hydronephrosis but did show a large abdominal aortic aneurysm with thrombus. CT angiogram was obtained which showed a large infrarenal aortic aneurysm with a saccular component. Blood cultures at [facility] have been negative and his urine culture has 3000 copies of gram-positive cocci. ID is consulted because the patient is being planned for endovascular repair of his aortic aneurysm this week. .     Patient Active Problem List   Diagnosis Code    CAP (community acquired pneumonia) J18.9    Altered mental status R41.82    UTI (urinary tract infection) N39.0    Fever R50.9     Past Medical History:   Diagnosis Date    CAD (coronary artery disease)       No family history on file. Social History   Substance Use Topics    Smoking status: Current Every Day Smoker    Smokeless tobacco: Not on file    Alcohol use Not on file     Past Surgical History:   Procedure Laterality Date    CARDIAC SURG PROCEDURE UNLIST        Prior to Admission medications    Medication Sig Start Date End Date Taking? Authorizing Provider   aspirin delayed-release 81 mg tablet Take 81 mg by mouth daily. Indications: myocardial infarction prevention, Myocardial Reinfarction Prevention   Yes Historical Provider   baclofen (LIORESAL) 10 mg tablet Take 10 mg by mouth two (2) times a day. Yes Estrella Soria MD   ferrous sulfate 325 mg (65 mg iron) tablet Take 325 mg by mouth Daily (before breakfast). Yes Estrella Soria MD   meloxicam (MOBIC) 7.5 mg tablet Take 7.5 mg by mouth daily. Yes Estrella Soria MD   hydroCHLOROthiazide (HYDRODIURIL) 25 mg tablet Take 25 mg by mouth daily. Yes Estrella Soria MD   omeprazole (PRILOSEC) 20 mg capsule Take 20 mg by mouth daily.    Yes Estrella Soria MD   levothyroxine (SYNTHROID) 100 mcg tablet Take 100 mcg by mouth Daily (before breakfast). Yes Estrella Soria MD   finasteride (PROSCAR) 5 mg tablet Take 5 mg by mouth daily. Yes Estrella Soria MD   lisinopril (PRINIVIL, ZESTRIL) 40 mg tablet Take 40 mg by mouth daily. Yes Estrella Soria MD   docusate sodium (COLACE) 100 mg capsule Take 100 mg by mouth two (2) times a day. Estrella Soria MD   nicotine (NICODERM CQ) 7 mg/24 hr 1 Patch by TransDERmal route every twenty-four (24) hours. Estrella Soria MD   diclofenac (VOLTAREN) 1 % gel Apply  to affected area four (4) times daily. Estrella Soria MD       No Known Allergies     Review of Systems:  A comprehensive review of systems was negative except for that written in the History of Present Illness. Objective:     Visit Vitals    /65 (BP 1 Location: Left arm, BP Patient Position: At rest)    Pulse 85    Temp 97.6 °F (36.4 °C)    Resp 19    Ht 5' 4\" (1.626 m)    Wt 67 kg (147 lb 9.6 oz)    SpO2 99%    BMI 25.34 kg/m2     Temp (24hrs), Av.1 °F (36.7 °C), Min:97.6 °F (36.4 °C), Max:98.5 °F (36.9 °C)       Lines:  Peripheral IV:            Physical Exam:    General:  Alert, cooperative, no distress   Eyes:  Sclera anicteric. Pupils equally round and reactive to light. Mouth/Throat: Mucous membranes normal, oral pharynx clear   Neck: Supple   Lungs:   Clear to auscultation bilaterally, good effort   CV:  Regular rate and rhythm, systolic murmur at RUSB   Abdomen:   Soft, non-tender.  bowel sounds normal. non-distended   Extremities: No cyanosis or edema   Skin: Skin color, texture, turgor normal. no acute rash or lesions   Lymph nodes: Cervical and supraclavicular normal   Musculoskeletal: No swelling or deformity   Lines/Devices:  Intact, no erythema, drainage or tenderness   Psych: Alert and responsive         Data Review:     CBC:Recent Labs      17   0643   WBC  7.9   HGB  11.0*   HCT  31.8*   PLT  204       BMP:  Recent Labs 17   0750  17   1224   CREA  1.11  1.30   BUN  10   --    NA  146*   --    K  3.4*   --    CL  113*   --    CO2  23   --    AGAP  10   --    GLU  107*   --        LFTS:  No results for input(s): TBILI, ALT, SGOT, AP, TP, ALB in the last 72 hours. Microbiology:     All Micro Results     Procedure Component Value Units Date/Time    CULTURE, URINE [[id number]]  (Abnormal) Collected:  17 1710    Order Status:  Completed Specimen:  Urine from Cath Urine Updated:  17 0907     Special Requests: NO SPECIAL REQUESTS        Culture result:         3000 COLONIES/mL GRAM POSITIVE COCCI (A)      SUBCULTURE IN PROGRESS       CULTURE, BLOOD [[id number]] Collected:  17 1231    Order Status:  Completed Specimen:  Blood from Blood Updated:  17     Special Requests: RIGHT HAND        Culture result: NO GROWTH 2 DAYS       CULTURE, BLOOD [[id number]] Collected:  17 1224    Order Status:  Completed Specimen:  Blood from Blood Updated:  17     Special Requests: LEFT WRIST        Culture result: NO GROWTH 2 DAYS             Imagin/4/17 CT abd:     IMPRESSION:  1. Infrarenal abdominal aortic aneurysm with saccular component measuring 4.0, x  6.7 cm. 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 2. High-grade left renal artery stenosis. 3. Right lower extremity: Moderate stenosis of the common femoral artery and  diffuse high-grade disease of SFA. 4. Left lower extremity: Occlusion of SFA reconstitution at the popliteal artery  level. 5. Bilateral pleural effusions. Cholelithiasis. 17 CXR:     Findings:   Stable post surgical changes overlie the mediastinal silhouette. A left shoulder  replacement is once again seen. Stable mild cardiomegaly is seen. Lungs are  expanded without pneumothorax. No evolving consolidation, or pleural effusion is  seen. Only stable likely chronic interstitial prominence is seen most evident at  the lung bases.     8/3/17 US retroperitoneum: IMPRESSION:     1) A 4.1 cm abdominal aortic aneurysm with peripheral thrombus. 2) Negative for hydronephrosis. Abnormally increased renal echogenicity. 3) Simple renal cysts.     Signed By: Enedina Lewis MD     August 6, 2017

## 2017-08-06 NOTE — PROGRESS NOTES
Pt is sitting in recliner. Respirations are even and unlabored. No SOB or pain is reported. Will continue to monitor.

## 2017-08-06 NOTE — CONSULTS
[address]       Name:  Cordelia Meneses   MR#:  [id number]   :  10/18/1931   Account #:  [de-identified]   Date of Adm:  2017       REFERRING PHYSICIAN: Allyson Torres MD.      CLINICAL INDICATION: Preop cardiovascular assessment prior to   endovascular repair of abdominal aortic aneurysm. HISTORY OF PRESENT ILLNESS: The patient is an 49-year-old    male with history of coronary artery disease and   distant coronary artery bypass grafting, systemic hypertension   and hypothyroidism who was admitted to the hospitalist service   on 2017 with urinary tract infection and pneumonia. The   record states that the patient was transferred from [facility] with a   diagnosis of urinary tract infection, fever, worsening renal   failure, hypoxia, pneumonia. The patient was given IV fluids and   antibiotic therapy and further in-hospital treatment. Apparently   the patient's fever has decreased with antibiotic therapy. An   abdominal CT scan had been performed, which showed an enlarging   infrarenal abdominal aortic aneurysm greater than 6 cm maximum   diameter. Endovascular repair is being considered by Dr. Karina Oden. We were asked to see the patient for preop assessment. The patient states he had coronary bypass surgery in , but   is not completely sure of that date. He denies any chest pain,   shortness of breath, PND, orthopnea, syncope, or congestive   heart failure. The patient seems to have some degree of dementia   and is not sure of significant dates in regard to past medical   advance. ADDITIONAL PAST MEDICAL HISTORY:   1. Coronary artery disease with coronary artery bypass grafting. 2. Hypertension. 3. Hypothyroidism. FAMILY HISTORY: Both his parents are . He is not sure   of the cause. SOCIAL HISTORY: The patient denies alcohol or tobacco.    CURRENT MEDICATIONS:   1. Aspirin 81 mg daily. 2. Baclofen 10 mg twice a day.    3. Pulmicort twice a day. 4. DuoNeb every 4 hours. 5. Rocephin 1 gram IV daily. 6. Lovenox 40 mg subcu daily. 7. Iron sulfate 325 mg daily. 8. Proscar 5 mg daily. 9. Solu-Medrol 20 mg every 12 hours. 10. Nicoderm CQ 1 patch daily. 11. Protonix 40 mg daily. REVIEW OF SYSTEMS: Limited:   SKIN: The patient denies rash. NEUROLOGIC: The patient denies stroke. PULMONARY: The patient denies shortness of breath. He does   complain of recent cough. CARDIAC: The patient denies chest pain, myocardial infarction,   PND or orthopnea. GASTROINTESTINAL: The patient denies melena, hematochezia. GENITOURINARY: The patient denies hematuria or dysuria. MUSCULOSKELETAL: The patient denies fall or fractures. PHYSICAL EXAMINATION   VITAL SIGNS: Temperature is 97.6, pulse is 85, respirations 20,   blood pressure was 173/65. GENERAL: The patient is a frail, elderly male who appears to be   in no acute distress. He is coughing. SKIN: Warm and dry. NEUROLOGIC: Alert. PSYCH: Normal mood, affect. ENT: Normocephalic, atraumatic. Pupils reactive. NECK: Supple, 2+ carotid upstrokes. CHEST: Reveals coarse bilateral breath sounds. I do not hear   significant wheezing. CARDIAC: Regular rate and rhythm without significant murmur,   rub, or gallop. ABDOMEN: Soft, protuberant, positive bowel sounds. I could not   clearly hear mass or hear bruits. EXTREMITIES: Reveal no significant edema. His pulses are   diminished in the lower extremities bilaterally. They appear to   be normal in his bilateral upper extremities. AVAILABLE LABORATORY DATA:   1. CBC:WBC is 7.9, hemoglobin is 11.0, hematocrit is 31.8,   platelet count is 390,734.   2. BMP: Sodium is 146, potassium is 3.4, chloride 113, CO2 is   23, creatinine is 1.11, BUN is 10, glucose is 107. 3. Chest x-ray shows stable cardiomegaly, without obvious   interstitial edema or pleural effusions. 4.  EKG shows a normal sinus rhythm with occasional PVCs, poor R-  wave progression, old anterior myocardial infarction cannot be   excluded. 5. Abdominal CT scan: Large infrarenal abdominal aortic aneurysm   measuring 4 x 6.7 cm with 2 cm left iliac aneurysm with high-  grade stenosis of the left renal artery and moderate stenosis of   the right common femoral artery with diffuse high-grade stenoses   in the SFA, there is occlusion of the left SFA with   reconstitution at the popliteal artery. Bilateral pleural   effusions were noted as well as cholelithiasis. IMPRESSION AND PLAN:   1. Large infrarenal abdominal aortic aneurysm with evidence of   diffuse severe bilateral lower extremity peripheral artery   disease. This has been evaluated by Dr. Madhav Euceda and endovascular   repair of the abdominal aortic aneurysm is being considered. 2. Preop cardiac status. The patient is at significant cardiac   risk for Vascular Surgery. The patient has advanced age with   questionable dementia, poorly controlled hypertension and   distant history of coronary artery disease with coronary artery   bypass grafting. Cardiovascular status appears to be stable over   the years. He does not describe any angina or signs of   congestive heart failure; however, CT scan does describe some   degree of pleural fluid. At this point, I would consider adding   beta-blocker therapy for additional hypertension treatment as   well as low-dose diuretic therapy while watching the patient's   renal function closely. I have ordered an echocardiogram to   assess LV function in regard to the addition of possible ACE   inhibitor therapy if the patient's renal function continues to   allow the use of these agents. 3. Coronary artery disease with distant coronary artery bypass   grafting. Presently asymptomatic, agree with aspirin therapy. Will add low-dose beta-blocker therapy as well as statin given   the patient's peripheral vascular disease, abdominal aortic   aneurysm, history of coronary artery disease. We will check   a baseline lipid panel. 4. History of pneumonia and urinary tract infection. This is   being managed by the patient's hospitalist service, he is   receiving IV antibiotic therapy. 5. History of hypothyroidism. This is being managed by the   hospitalist service. 6. Dementia with history of acute metabolic encephalopathy,   possibly secondary to sepsis, urinary infection and pneumonia. Apparently this has improved since his hospitalization. 7. Suspected chronic obstructive pulmonary disease. The patient   is being treated with bronchodilator therapy and apparently he   does have a history of tobacco use. 8. Additional recommendations forthcoming based upon response to   treatment.         Liza Morin MD      BMS / CD   D:  08/06/2017   11:19   T:  08/06/2017   13:57   Job #:  [id number]

## 2017-08-06 NOTE — PROGRESS NOTES
Pt pulled out line from left forearm. No other issues found. No needs voiced. Call light is within reach. Will continue to monitor.

## 2017-08-06 NOTE — PROGRESS NOTES
Progress Note    Patient: Jackie Coombs MRN: [id number]  SSN: [id number]    YOB: 1931  Age: 80 y.o. Sex: male      Admit Date: 8/1/2017    LOS: 4 days     Subjective:   Patient is feeling better today. No fevers today. His breathing is better today. No CP or cough. No N/V. No chills or diaphoresis. No diarrhea. No abdominal pain. Review of Systems:  Pertinent per HPI.     Medications:  Current Facility-Administered Medications   Medication Dose Route Frequency    [START ON 8/6/2017] enoxaparin (LOVENOX) injection 40 mg  40 mg SubCUTAneous Q24H    albuterol-ipratropium (DUO-NEB) 2.5 MG-0.5 MG/3 ML  3 mL Nebulization Q4H RT    budesonide (PULMICORT) 500 mcg/2 ml nebulizer suspension  500 mcg Nebulization BID RT    sodium chloride (NS) flush 5-10 mL  5-10 mL IntraVENous Q8H    sodium chloride (NS) flush 5-10 mL  5-10 mL IntraVENous PRN    acetaminophen (TYLENOL) tablet 650 mg  650 mg Oral Q4H PRN    ondansetron (ZOFRAN) injection 4 mg  4 mg IntraVENous Q4H PRN    aspirin delayed-release tablet 81 mg  81 mg Oral DAILY    baclofen (LIORESAL) tablet 10 mg  10 mg Oral BID    docusate sodium (COLACE) capsule 100 mg  100 mg Oral BID    ferrous sulfate tablet 325 mg  325 mg Oral ACB    finasteride (PROSCAR) tablet 5 mg  5 mg Oral DAILY    levothyroxine (SYNTHROID) tablet 100 mcg  100 mcg Oral ACB    nicotine (NICODERM CQ) 7 mg/24 hr patch 1 Patch  1 Patch TransDERmal Q24H    pantoprazole (PROTONIX) tablet 40 mg  40 mg Oral ACB    azithromycin (ZITHROMAX) 500 mg in 0.9% sodium chloride (MBP/ADV) 250 mL  500 mg IntraVENous Q24H    cefTRIAXone (ROCEPHIN) 1 g in 0.9% sodium chloride (MBP/ADV) 50 mL MBP  1 g IntraVENous Q24H       Objective:     Vitals:    08/05/17 1401 08/05/17 1452 08/05/17 1616 08/05/17 1641   BP: 139/58 148/67  168/71   Pulse: 86 77  85   Resp: 20 18  20   Temp: 98.5 °F (36.9 °C) 98.3 °F (36.8 °C)  97.9 °F (36.6 °C)   SpO2: 94% 97% 96% 98%   Weight: Height:            Physical Exam:   General: awake, alert, no apparent distress  HEENT: anicteric. PERRL  Lungs: Clear to auscultation bilaterally. No rales, no wheezes, no rhonchi. Breathing nonlabored. Heart: Regular rate and rhythm. Abdomen: Soft, nontender, nondistended. Bowel sounds normal.  No rebound tenderness, guarding, or rigidity. Extremities:  No LE edema. Skin: Warm/dry. No rashes or lesions. Neurologic: nonfocal  Psych: AA Ox3. Normal mood and affect. Lab/Data Review:  Recent Results (from the past 24 hour(s))   C REACTIVE PROTEIN, QT    Collection Time: 08/05/17  7:50 AM   Result Value Ref Range    C-Reactive protein 13.0 (H) 0.0 - 0.9 mg/dL   METABOLIC PANEL, BASIC    Collection Time: 08/05/17  7:50 AM   Result Value Ref Range    Sodium 146 (H) 136 - 145 mmol/L    Potassium 3.4 (L) 3.5 - 5.1 mmol/L    Chloride 113 (H) 98 - 107 mmol/L    CO2 23 21 - 32 mmol/L    Anion gap 10 7 - 16 mmol/L    Glucose 107 (H) 65 - 100 mg/dL    BUN 10 8 - 23 MG/DL    Creatinine 1.11 0.8 - 1.5 MG/DL    GFR est AA >60 >60 ml/min/1.73m2    GFR est non-AA >60 >60 ml/min/1.73m2    Calcium 8.0 (L) 8.3 - 10.4 MG/DL   GLUCOSE, POC    Collection Time: 08/05/17 11:33 AM   Result Value Ref Range    Glucose (POC) 129 (H) 65 - 100 mg/dL     I have reviewed new clinical data. Assessment:     Principal Problem:    CAP (community acquired pneumonia) (8/1/2017)    Active Problems:    Altered mental status (8/1/2017)      UTI (urinary tract infection) (8/1/2017)      Fever (8/1/2017)          Plan:   # PNA likely bacterial, community acquired. -CXR negative for acute infiltrates. Chronic changes at the bases  -continue Ceftriaxone and Azithromycin.   -will obtain a sputum culture if he is able to expectorate  -obtain a blood culture if T > 100.4    # UTI  -no records of UA or urine culture. -UA and culture obtained after he was started on antibiotics.    -patient is on Ceftriaxone which will cover UTI    # Abdominal aortic aneurysm and thrombus  -as reported in the abdominal ultrasound  -CTA of the abdomen revealed an enlarged AAA, no thrombus reported. Also noted high-grade left renal artery stenosis. -vascular surgery consulted. Case discussed with Dr. Kaylee Mullins    # Acute metabolic encephalopathy, secondary to sepsis, UTI and PNA  -continue management of underlying disorders. 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 -continue IV fluids, gently in preparation of IV iodine contrast exposure. # Hypothyroidism  -continue Synthroid. # Risk stratification  -High due to AAA, renal stenosis, and high medical complexity    # DVT prophylaxis: Lovenox    # Disposition: Undetermined at this time. Most likely home with [facility] PT at discharge.      Signed By: Tori Toledo MD     August 5, 2017

## 2017-08-07 LAB
ANION GAP BLD CALC-SCNC: 10 MMOL/L (ref 7–16)
BUN SERPL-MCNC: 15 MG/DL (ref 8–23)
CALCIUM SERPL-MCNC: 8.7 MG/DL (ref 8.3–10.4)
CHLORIDE SERPL-SCNC: 111 MMOL/L (ref 98–107)
CO2 SERPL-SCNC: 25 MMOL/L (ref 21–32)
CREAT SERPL-MCNC: 1.16 MG/DL (ref 0.8–1.5)
GLUCOSE SERPL-MCNC: 123 MG/DL (ref 65–100)
POTASSIUM SERPL-SCNC: 3.8 MMOL/L (ref 3.5–5.1)
SODIUM SERPL-SCNC: 146 MMOL/L (ref 136–145)

## 2017-08-07 PROCEDURE — 80048 BASIC METABOLIC PNL TOTAL CA: CPT | Performed by: INTERNAL MEDICINE

## 2017-08-07 PROCEDURE — 65270000029 HC RM PRIVATE

## 2017-08-07 PROCEDURE — 94760 N-INVAS EAR/PLS OXIMETRY 1: CPT

## 2017-08-07 PROCEDURE — 94640 AIRWAY INHALATION TREATMENT: CPT

## 2017-08-07 PROCEDURE — 92526 ORAL FUNCTION THERAPY: CPT

## 2017-08-07 PROCEDURE — 74011000250 HC RX REV CODE- 250: Performed by: NURSE PRACTITIONER

## 2017-08-07 PROCEDURE — 74011250636 HC RX REV CODE- 250/636: Performed by: INTERNAL MEDICINE

## 2017-08-07 PROCEDURE — 36415 COLL VENOUS BLD VENIPUNCTURE: CPT | Performed by: INTERNAL MEDICINE

## 2017-08-07 PROCEDURE — 74011250637 HC RX REV CODE- 250/637: Performed by: INTERNAL MEDICINE

## 2017-08-07 PROCEDURE — 74011250637 HC RX REV CODE- 250/637: Performed by: HOSPITALIST

## 2017-08-07 PROCEDURE — 74011250636 HC RX REV CODE- 250/636: Performed by: HOSPITALIST

## 2017-08-07 PROCEDURE — 93306 TTE W/DOPPLER COMPLETE: CPT

## 2017-08-07 PROCEDURE — 74011000258 HC RX REV CODE- 258: Performed by: INTERNAL MEDICINE

## 2017-08-07 RX ADMIN — DOCUSATE SODIUM 100 MG: 100 CAPSULE, LIQUID FILLED ORAL at 16:08

## 2017-08-07 RX ADMIN — Medication 10 ML: at 06:07

## 2017-08-07 RX ADMIN — IPRATROPIUM BROMIDE AND ALBUTEROL SULFATE 3 ML: 2.5; .5 SOLUTION RESPIRATORY (INHALATION) at 16:31

## 2017-08-07 RX ADMIN — DOCUSATE SODIUM 100 MG: 100 CAPSULE, LIQUID FILLED ORAL at 08:16

## 2017-08-07 RX ADMIN — Medication 5 ML: at 21:38

## 2017-08-07 RX ADMIN — IPRATROPIUM BROMIDE AND ALBUTEROL SULFATE 3 ML: 2.5; .5 SOLUTION RESPIRATORY (INHALATION) at 04:46

## 2017-08-07 RX ADMIN — METOPROLOL TARTRATE 25 MG: 25 TABLET ORAL at 08:16

## 2017-08-07 RX ADMIN — IPRATROPIUM BROMIDE AND ALBUTEROL SULFATE 3 ML: 2.5; .5 SOLUTION RESPIRATORY (INHALATION) at 07:49

## 2017-08-07 RX ADMIN — FUROSEMIDE 20 MG: 20 TABLET ORAL at 08:16

## 2017-08-07 RX ADMIN — Medication 5 ML: at 14:00

## 2017-08-07 RX ADMIN — IPRATROPIUM BROMIDE AND ALBUTEROL SULFATE 3 ML: 2.5; .5 SOLUTION RESPIRATORY (INHALATION) at 11:29

## 2017-08-07 RX ADMIN — PANTOPRAZOLE SODIUM 40 MG: 40 TABLET, DELAYED RELEASE ORAL at 06:07

## 2017-08-07 RX ADMIN — ASPIRIN 81 MG: 81 TABLET, COATED ORAL at 08:17

## 2017-08-07 RX ADMIN — AMPICILLIN SODIUM 2 G: 2 INJECTION, POWDER, FOR SOLUTION INTRAMUSCULAR; INTRAVENOUS at 11:05

## 2017-08-07 RX ADMIN — BUDESONIDE 500 MCG: 0.5 SUSPENSION RESPIRATORY (INHALATION) at 07:49

## 2017-08-07 RX ADMIN — BACLOFEN 10 MG: 10 TABLET ORAL at 16:08

## 2017-08-07 RX ADMIN — FERROUS SULFATE TAB 325 MG (65 MG ELEMENTAL FE) 325 MG: 325 (65 FE) TAB at 06:06

## 2017-08-07 RX ADMIN — AMPICILLIN SODIUM 2 G: 2 INJECTION, POWDER, FOR SOLUTION INTRAMUSCULAR; INTRAVENOUS at 16:08

## 2017-08-07 RX ADMIN — FINASTERIDE 5 MG: 5 TABLET, FILM COATED ORAL at 08:16

## 2017-08-07 RX ADMIN — BACLOFEN 10 MG: 10 TABLET ORAL at 08:17

## 2017-08-07 RX ADMIN — AMPICILLIN SODIUM 2 G: 2 INJECTION, POWDER, FOR SOLUTION INTRAMUSCULAR; INTRAVENOUS at 00:01

## 2017-08-07 RX ADMIN — METHYLPREDNISOLONE SODIUM SUCCINATE 20 MG: 40 INJECTION, POWDER, FOR SOLUTION INTRAMUSCULAR; INTRAVENOUS at 21:36

## 2017-08-07 RX ADMIN — AMPICILLIN SODIUM 2 G: 2 INJECTION, POWDER, FOR SOLUTION INTRAMUSCULAR; INTRAVENOUS at 23:52

## 2017-08-07 RX ADMIN — ATORVASTATIN CALCIUM 40 MG: 40 TABLET, FILM COATED ORAL at 21:36

## 2017-08-07 RX ADMIN — BUDESONIDE 500 MCG: 0.5 SUSPENSION RESPIRATORY (INHALATION) at 20:40

## 2017-08-07 RX ADMIN — AMPICILLIN SODIUM 2 G: 2 INJECTION, POWDER, FOR SOLUTION INTRAMUSCULAR; INTRAVENOUS at 06:06

## 2017-08-07 RX ADMIN — IPRATROPIUM BROMIDE AND ALBUTEROL SULFATE 3 ML: 2.5; .5 SOLUTION RESPIRATORY (INHALATION) at 20:41

## 2017-08-07 RX ADMIN — IPRATROPIUM BROMIDE AND ALBUTEROL SULFATE 3 ML: 2.5; .5 SOLUTION RESPIRATORY (INHALATION) at 23:50

## 2017-08-07 RX ADMIN — LEVOTHYROXINE SODIUM 100 MCG: 100 TABLET ORAL at 06:06

## 2017-08-07 RX ADMIN — METOPROLOL TARTRATE 25 MG: 25 TABLET ORAL at 21:36

## 2017-08-07 RX ADMIN — ENOXAPARIN SODIUM 40 MG: 40 INJECTION SUBCUTANEOUS at 08:17

## 2017-08-07 RX ADMIN — METHYLPREDNISOLONE SODIUM SUCCINATE 20 MG: 40 INJECTION, POWDER, FOR SOLUTION INTRAMUSCULAR; INTRAVENOUS at 08:17

## 2017-08-07 NOTE — PROGRESS NOTES
SW spoke with pt about going to str. Pt is agreeable to this dc plan. Pt doesn't have an emergency contact and doesn't remember any family member's phone numbers. RN made aware and will try to get a phone number when family comes to visit. Since pt lives in [facility] a referral to [facility] in [place] was made. SW following.

## 2017-08-07 NOTE — PROGRESS NOTES
Son called to get update and was informed of surgery needed and maybe str.     Son's phone numbers are:  Home: [phone]  Cell: [phone]    Will inform SW and MDs in the AM.

## 2017-08-07 NOTE — PROGRESS NOTES
Patient bathed self but needed help with ambulating. Patient is somewhat unsteady so help is required. Patient now up in recliner with chair pain alarmed. Patient now relaxing in recliner watching TV. Will continue to monitor.

## 2017-08-07 NOTE — PROGRESS NOTES
[facility] CARDIOLOGY PROGRESS NOTE           8/7/2017 9:00 AM    Admit Date: 8/1/2017      Subjective:   No complaints of chest pain. Awaiting echo, pending AAA surgery    ROS:  Cardiovascular:  As noted above    Objective:      Vitals:    08/07/17 0411 08/07/17 0447 08/07/17 0727 08/07/17 0750   BP: 141/59  155/66    Pulse: 74 72 82    Resp: 18  18    Temp: 98.9 °F (37.2 °C)  98 °F (36.7 °C)    SpO2: 94% 96% 95% 95%   Weight:       Height:           Physical Exam:  General-No Acute Distress  Neck- supple, no JVD  CV- regular rate and rhythm no MRG  Lung- clear bilaterally  Abd- soft, nontender, nondistended  Ext- no edema bilaterally.   Skin- warm and dry    Data Review:   Recent Labs      08/07/17   0757  08/06/17   0643  08/05/17   0750   NA  146*   --   146*   K  3.8   --   3.4*   BUN  15   --   10   CREA  1.16   --   1.11   GLU  123*   --   107*   WBC   --   7.9   --    HGB   --   11.0*   --    HCT   --   31.8*   --    PLT   --   204   --        Assessment/Plan:     Principal Problem:    CAP (community acquired pneumonia) (8/1/2017)---per IM    Active Problems:    Altered mental status (8/1/2017)--memory deficit noted, pleasant, oriented to name, place      UTI (urinary tract infection) (8/1/2017)      Fever (8/1/2017)    AAA--pre-op, seen in consultation, echo pending, moderate operative risk, added BB therapy      Maggie Dietrich NP  8/7/2017 9:00 AM

## 2017-08-07 NOTE — PROGRESS NOTES
Patient voices no concerns at this time. Patient is relaxing in bed watching TV. Call light within reach and patient instructed to call if assistance is needed. Will continue to monitor.

## 2017-08-07 NOTE — PROGRESS NOTES
Patient in bed resting with no complaints at this time. Patient is alert and orientated with no distress noted. IV intact and patent with no s/s of infection noted. Respirations even and unlabored with heart rate regular. Patient unable to ambulate independently without assistance; needs x1 r/t weakness and unsteady gait. Bed in low locked position with call light within reach. Patient instructed to call if assistance is needed. Will continue to monitor.

## 2017-08-07 NOTE — PROGRESS NOTES
Bedside report  received from [place]. Assessment completed. Bed is in low and locked position with floor free of objects. Patient AxOx3, and resting quietly in bed. No needs noted at present. Respirations even and non labored. Verbalized understanding to call for needs. Call light within reach. Will continue to monitor pt.

## 2017-08-07 NOTE — PROGRESS NOTES
Planning for EVAR, needs 10d IV ampicillin prior and vanc+cefazolin on call to OR, per ID  Seen by Cardiology yesterday, recommendations made  Patient expressed basic understanding of surgery, native language is [place] but speaks [place] well  Will try to track down family    AVSS  Lungs CTAB, receiving tx at time of exam  RRR, systolic murmur heard  Abdomen soft, non-tender, non-distended, +BS  LE warm, palpable DP pulses, NV intact    A/P:  - echo per cardiology  - IV ABX per ID  - need family to ensure patient's consent to surgery  - plan for EVAR 8/16/2017  - further plan per Dr. Raul Galvin, PA-C  Physician Assistant with Vascular Surgery Bobbi Landrum MD / Jessica Del Toro.  Radha Toth MD / Micah Grubbs MD

## 2017-08-07 NOTE — PROGRESS NOTES
STG: Pt will tolerate regular textures/thin liquids without signs/sx of aspiration with 100% accuracy  STG: Pt will participate with trials of mixed consistencies to ensure tolerance x1  LTG: Pt will tolerate the least restrictive diet at discharge without respiratory compromise    Speech language pathology: bedside swallow note: Daily Note and Discharge    NAME/AGE/GENDER: Brian Cook is a 80 y.o. male  DATE: 8/7/2017  PRIMARY DIAGNOSIS: Abdominal aortic aneurysm (AAA) without rupture ([address] [I71.4]  Procedure(s) (LRB):  AORTIC ABDOMINAL ANEURYSM REPAIR ENDOVASCULAR (EVAR)  GEN VS SPINAL ROOM 836 (N/A)    ICD-10: Treatment Diagnosis: dysphagia; oropharyngeal R13.12  INTERDISCIPLINARY COLLABORATION: Registered Nurse  PRECAUTIONS/ALLERGIES: Review of patient's allergies indicates no known allergies. ASSESSMENT: Mr. Los Ortiz presents with no overt signs/sx of aspiration with lunch of regular/thin and trial of mixed consistencies. No further ST indicated. Recommend continue current diet. Fully upright with all meals and encouraged pt to take small bites/sips. · continue prescribed diet  MEDICATIONS:  · With liquid  COMPENSATORY STRATEGIES/MODIFICATIONS INCLUDING:  · Small sips and bites  SUBJECTIVE:   Cooperative. History of Present Injury/Illness: Mr. Los Ortiz  has a past medical history of CAD (coronary artery disease). He also  has a past surgical history that includes cardiac surg procedure unlist.  Present Symptoms: cough with mixed x1  Pain Intensity 1: 0  Current Medications:   No current facility-administered medications on file prior to encounter. Current Outpatient Prescriptions on File Prior to Encounter   Medication Sig Dispense Refill    baclofen (LIORESAL) 10 mg tablet Take 10 mg by mouth two (2) times a day.  ferrous sulfate 325 mg (65 mg iron) tablet Take 325 mg by mouth Daily (before breakfast).  meloxicam (MOBIC) 7.5 mg tablet Take 7.5 mg by mouth daily.       hydroCHLOROthiazide (HYDRODIURIL) 25 mg tablet Take 25 mg by mouth daily.  omeprazole (PRILOSEC) 20 mg capsule Take 20 mg by mouth daily.  levothyroxine (SYNTHROID) 100 mcg tablet Take 100 mcg by mouth Daily (before breakfast).  finasteride (PROSCAR) 5 mg tablet Take 5 mg by mouth daily.  lisinopril (PRINIVIL, ZESTRIL) 40 mg tablet Take 40 mg by mouth daily.  docusate sodium (COLACE) 100 mg capsule Take 100 mg by mouth two (2) times a day.  nicotine (NICODERM CQ) 7 mg/24 hr 1 Patch by TransDERmal route every twenty-four (24) hours.  diclofenac (VOLTAREN) 1 % gel Apply  to affected area four (4) times daily. Current Dietary Status:  Regular textures      History of reflux:  no  Social History/Home Situation: home alone   Home Environment: Private residence  # Steps to Enter: 6  Rails to Enter: Yes  Hand Rails : Bilateral  One/Two Story Residence: One story  Living Alone: Yes  Support Systems: Child(jessica)  Patient Expects to be Discharged to[de-identified] Private residence  Current DME Used/Available at Home: Terence Ruchelles, rollator, Radha Cancer, straight, Shower chair  Tub or Shower Type: Tub/Shower combination  OBJECTIVE:   Respiratory Status:  Room air     CXR Results: pending  MRI/CT Results:n/a  Oral Motor Structure/Speech:  Oral-Motor Structure/Motor Speech  Labial: No impairment  Dentition: Intact  Oral Hygiene: adequate  Lingual: No impairment    Cognitive and Communication Status:  Neurologic State: Alert  Orientation Level: Oriented X4  Cognition: Appropriate decision making  Perception: Appears intact  Perseveration: No perseveration noted  Safety/Judgement: Fall prevention    BEDSIDE SWALLOW EVALUATION  Oral Assessment:  Oral Assessment  Labial: No impairment  Dentition: Intact  Oral Hygiene: adequate  Lingual: No impairment  P.O. Trials:  Patient Position: upright in bed    The patient was given tsp to straw amounts of the following:   Consistency Presented: Thin liquid; Solid;Mixed consistency  How Presented: Straw;SLP-fed/presented;Spoon    ORAL PHASE:  Bolus Acceptance: No impairment  Bolus Formation/Control: No impairment  Propulsion: No impairment     Oral Residue: None    PHARYNGEAL PHASE:  Initiation of Swallow: No impairment     Aspiration Signs/Symptoms: None  Vocal Quality: No impairment           Pharyngeal Phase Characteristics: No impairment, issues, or problems     OTHER OBSERVATIONS:  Rate/bite size: WNL   Endurance: WNL     Tool Used: Dysphagia Outcome and Severity Scale (KATINA)    Score Comments   Normal Diet  [] 7 With no strategies or extra time needed   Functional Swallow  [x] 6 May have mild oral or pharyngeal delay       Mild Dysphagia    [] 5 Which may require one diet consistency restricted (those who demonstrate penetration which is entirely cleared on MBS would be included)   Mild-Moderate Dysphagia  [] 4 With 1-2 diet consistencies restricted       Moderate Dysphagia  [] 3 With 2 or more diet consistencies restricted       Moderately Severe Dysphagia  [] 2 With partial PO strategies (trials with ST only)       Severe Dysphagia  [] 1 With inability to tolerate any PO safely          Score:  Initial: 6 Most Recent: X (Date: -- )   Interpretation of Tool: The Dysphagia Outcome and Severity Scale (KATINA) is a simple, easy-to-use, 7-point scale developed to systematically rate the functional severity of dysphagia based on objective assessment and make recommendations for diet level, independence level, and type of nutrition. Score 7 6 5 4 3 2 1   Modifier CH CI CJ CK CL CM CN   ?  Swallowing:     - CURRENT STATUS: CI - 1%-19% impaired, limited or restricted    - GOAL STATUS:  CH - 0% impaired, limited or restricted    - D/C STATUS:  ---------------To be determined---------------  Payor: HU[employer]NA MEDICARE / Plan: [facility] HU[employer]NA MEDICARE CHOICE PPO/PFFS / Product Type: Managed Care Medicare /     TREATMENT:    (In addition to Assessment/Re-Assessment sessions the following treatments were rendered)  Dysphagia Activities: Activities/Procedures listed utilized to improve progress in diet tolerance. Required minimal cueing to improve swallow safety.     MODALITIES:         ORAL MOTOR  EXERCISES:        LARYNGEAL / PHARYNGEAL EXERCISES:        __________________________________________________________________________________________________  Safety:   After treatment position/precautions:  · RN notified  · Upright in Bed  No further ST indicated,  Time In: 1226  Time Out: 183 [facility] MA/JACINTA/SLP

## 2017-08-07 NOTE — PROGRESS NOTES
Hospitalist Progress Note    2017  Admit Date: 2017 12:17 AM   NAME: Jeanette Amanda   :  10/18/1931   MRN:  [id number]   Attending: Willem Medina MD  PCP:  None    SUBJECTIVE:   The patient is an 59-year-old  male with history of coronary artery disease and   distant coronary artery bypass grafting, systemic hypertension and hypothyroidism who was admitted to the hospitalist service   on 2017 with urinary tract infection and pneumonia. The record states that the patient was transferred from [facility] with a   diagnosis of urinary tract infection, fever, worsening renal failure, hypoxia, pneumonia. The patient was given IV fluids and   antibiotic therapy and further in-hospital treatment. He was found to have an enlarging   infrarenal abdominal aortic aneurysm greater than 6 cm with planned endovascular repair by Dr. Michelle Black, Vascular attending. He was consulted by [facility] Cardiology yesterday in preparation for surgery of which the date has not yet been set. : Pneumonia and UTI improving with current antibiotic therapy. Pt. Continues with nebulizer Rx for COPD. He denies chest pain, SOB, nausea or vomiting. Denies any further chills or fever.       Review of Systems negative with exception of pertinent positives noted above  PHYSICAL EXAM     Visit Vitals    /66 (BP 1 Location: Right arm)    Pulse 82    Temp 98 °F (36.7 °C)    Resp 18    Ht 5' 4\" (1.626 m)    Wt 67 kg (147 lb 9.6 oz)    SpO2 95%    BMI 25.34 kg/m2      Temp (24hrs), Av.2 °F (36.8 °C), Min:97.5 °F (36.4 °C), Max:98.9 °F (37.2 °C)    Oxygen Therapy  O2 Sat (%): 95 % (17 075)  Pulse via Oximetry: 75 beats per minute (17 075)  O2 Device: Room air (17)  O2 Flow Rate (L/min): 2 l/min (17 3599)    Intake/Output Summary (Last 24 hours) at 17 0833  Last data filed at 17 0407   Gross per 24 hour   Intake              360 ml   Output                3 ml   Net 357 ml      General: WNWD. A&O x3. NAD. Sitting up in bed just finishing breakfast.  Lungs:  Bibasilar rales with scattered wheezes in both lungs that clear with cough. Heart:  Regular rate and rhythm,  No murmur, rub, or gallop  Abdomen: Soft, Non distended, Non tender, Positive bowel sounds  Extremities: No cyanosis, clubbing or edema. No obvious impairment. Neurologic:  No focal deficits. Psych:             Normal mood and affect    ASSESSMENT /PLAN     1. Pneumonia: Blood cultures x2 obtain, NGTD. Sepsis slowly and gradually improved. Continue antibiotics. Pt. Reports symptoms improving. 2. UTI:  Urine culture growing Gram Positive Cocci, small amount. Pt. Without any urinary complaints. 3. Abdominal Aortic Aneurysm and thrombus: Reported in Abd US and has been addressed by Vascular Surgery attending Dr. Richie Diaz who is planning surgery for Endovascular repair. Cardiology consult for pre-op completed yesterday. 4. Acute Metabolic Encephalopathy, secondary to sepsis improving. Patient is likely at his baseline mentation. 5. Sepsis: Secondary to pneumonia and UTI improved. 6. COPD Exacerbation: Duoneb and Solumedrol at 20 mg. BID. 7. JEB: CKD stage II. Improved with IV hydration. 8. Hypothyroidism: Stable. Continue Synthroid.           DVT Prophylaxis: Lovenox    Abilio Gin, [place]

## 2017-08-08 LAB
BACTERIA SPEC CULT: ABNORMAL
SERVICE CMNT-IMP: ABNORMAL

## 2017-08-08 PROCEDURE — 65270000029 HC RM PRIVATE

## 2017-08-08 PROCEDURE — 74011250637 HC RX REV CODE- 250/637: Performed by: INTERNAL MEDICINE

## 2017-08-08 PROCEDURE — 74011250636 HC RX REV CODE- 250/636: Performed by: INTERNAL MEDICINE

## 2017-08-08 PROCEDURE — 94760 N-INVAS EAR/PLS OXIMETRY 1: CPT

## 2017-08-08 PROCEDURE — 77010033678 HC OXYGEN DAILY

## 2017-08-08 PROCEDURE — 74011250637 HC RX REV CODE- 250/637: Performed by: HOSPITALIST

## 2017-08-08 PROCEDURE — 74011000250 HC RX REV CODE- 250: Performed by: NURSE PRACTITIONER

## 2017-08-08 PROCEDURE — 74011250636 HC RX REV CODE- 250/636: Performed by: HOSPITALIST

## 2017-08-08 PROCEDURE — 74011000258 HC RX REV CODE- 258: Performed by: INTERNAL MEDICINE

## 2017-08-08 PROCEDURE — 94640 AIRWAY INHALATION TREATMENT: CPT

## 2017-08-08 RX ADMIN — IPRATROPIUM BROMIDE AND ALBUTEROL SULFATE 3 ML: 2.5; .5 SOLUTION RESPIRATORY (INHALATION) at 04:20

## 2017-08-08 RX ADMIN — IPRATROPIUM BROMIDE AND ALBUTEROL SULFATE 3 ML: 2.5; .5 SOLUTION RESPIRATORY (INHALATION) at 11:47

## 2017-08-08 RX ADMIN — BUDESONIDE 500 MCG: 0.5 SUSPENSION RESPIRATORY (INHALATION) at 19:25

## 2017-08-08 RX ADMIN — METOPROLOL TARTRATE 25 MG: 25 TABLET ORAL at 22:40

## 2017-08-08 RX ADMIN — PANTOPRAZOLE SODIUM 40 MG: 40 TABLET, DELAYED RELEASE ORAL at 05:40

## 2017-08-08 RX ADMIN — BUDESONIDE 500 MCG: 0.5 SUSPENSION RESPIRATORY (INHALATION) at 08:13

## 2017-08-08 RX ADMIN — IPRATROPIUM BROMIDE AND ALBUTEROL SULFATE 3 ML: 2.5; .5 SOLUTION RESPIRATORY (INHALATION) at 08:13

## 2017-08-08 RX ADMIN — LEVOTHYROXINE SODIUM 100 MCG: 100 TABLET ORAL at 05:40

## 2017-08-08 RX ADMIN — BACLOFEN 10 MG: 10 TABLET ORAL at 16:21

## 2017-08-08 RX ADMIN — BACLOFEN 10 MG: 10 TABLET ORAL at 08:26

## 2017-08-08 RX ADMIN — IPRATROPIUM BROMIDE AND ALBUTEROL SULFATE 3 ML: 2.5; .5 SOLUTION RESPIRATORY (INHALATION) at 19:25

## 2017-08-08 RX ADMIN — Medication 5 ML: at 05:41

## 2017-08-08 RX ADMIN — IPRATROPIUM BROMIDE AND ALBUTEROL SULFATE 3 ML: 2.5; .5 SOLUTION RESPIRATORY (INHALATION) at 15:27

## 2017-08-08 RX ADMIN — ATORVASTATIN CALCIUM 40 MG: 40 TABLET, FILM COATED ORAL at 22:40

## 2017-08-08 RX ADMIN — FERROUS SULFATE TAB 325 MG (65 MG ELEMENTAL FE) 325 MG: 325 (65 FE) TAB at 05:40

## 2017-08-08 RX ADMIN — METOPROLOL TARTRATE 25 MG: 25 TABLET ORAL at 08:26

## 2017-08-08 RX ADMIN — Medication 5 ML: at 22:41

## 2017-08-08 RX ADMIN — FINASTERIDE 5 MG: 5 TABLET, FILM COATED ORAL at 08:25

## 2017-08-08 RX ADMIN — METHYLPREDNISOLONE SODIUM SUCCINATE 20 MG: 40 INJECTION, POWDER, FOR SOLUTION INTRAMUSCULAR; INTRAVENOUS at 22:40

## 2017-08-08 RX ADMIN — IPRATROPIUM BROMIDE AND ALBUTEROL SULFATE 3 ML: 2.5; .5 SOLUTION RESPIRATORY (INHALATION) at 23:50

## 2017-08-08 RX ADMIN — ASPIRIN 81 MG: 81 TABLET, COATED ORAL at 08:26

## 2017-08-08 RX ADMIN — DOCUSATE SODIUM 100 MG: 100 CAPSULE, LIQUID FILLED ORAL at 08:25

## 2017-08-08 RX ADMIN — AMPICILLIN SODIUM 2 G: 2 INJECTION, POWDER, FOR SOLUTION INTRAMUSCULAR; INTRAVENOUS at 16:21

## 2017-08-08 RX ADMIN — AMPICILLIN SODIUM 2 G: 2 INJECTION, POWDER, FOR SOLUTION INTRAMUSCULAR; INTRAVENOUS at 11:17

## 2017-08-08 RX ADMIN — DOCUSATE SODIUM 100 MG: 100 CAPSULE, LIQUID FILLED ORAL at 16:21

## 2017-08-08 RX ADMIN — ENOXAPARIN SODIUM 40 MG: 40 INJECTION SUBCUTANEOUS at 08:26

## 2017-08-08 RX ADMIN — FUROSEMIDE 20 MG: 20 TABLET ORAL at 08:26

## 2017-08-08 RX ADMIN — AMPICILLIN SODIUM 2 G: 2 INJECTION, POWDER, FOR SOLUTION INTRAMUSCULAR; INTRAVENOUS at 05:42

## 2017-08-08 RX ADMIN — METHYLPREDNISOLONE SODIUM SUCCINATE 20 MG: 40 INJECTION, POWDER, FOR SOLUTION INTRAMUSCULAR; INTRAVENOUS at 08:25

## 2017-08-08 RX ADMIN — Medication 5 ML: at 14:00

## 2017-08-08 NOTE — PROGRESS NOTES
[facility] CARDIOLOGY PROGRESS NOTE           8/8/2017 9:24 AM    Admit Date: 8/1/2017      Subjective:     Patient has no complaints. No chest discomfort shortness of breath. He has remote history of coronary artery bypass grafting echocardiogram was completed. Review of Systems   Constitutional: Negative for weight loss. HENT: Positive for hearing loss. Eyes: Negative for blurred vision. Respiratory: Negative for cough. Cardiovascular: Negative for chest pain. Skin: Negative for rash. Neurological: Negative for dizziness. Endo/Heme/Allergies: Does not bruise/bleed easily. Psychiatric/Behavioral: Negative for suicidal ideas. Objective:      Vitals:    08/08/17 0351 08/08/17 0420 08/08/17 0741 08/08/17 0813   BP: 186/87  167/76    Pulse: 74  76    Resp: 18  16    Temp: 98.1 °F (36.7 °C)  98.3 °F (36.8 °C)    SpO2: 96% 95% 92% 96%   Weight:       Height:             Physical Exam   Constitutional: He is oriented to person, place, and time. He appears well-developed. HENT:   Head: Normocephalic and atraumatic. Eyes: Pupils are equal, round, and reactive to light. Neck: Normal range of motion. Cardiovascular: Normal rate. Murmur heard. Early systolic murmur is present with a grade of 1/6   Pulmonary/Chest: Effort normal.   Abdominal: Soft. He exhibits no distension. There is no tenderness. Musculoskeletal: He exhibits no edema. Neurological: He is alert and oriented to person, place, and time. No cranial nerve deficit. Skin: Skin is warm and dry. No rash noted. No erythema. Psychiatric: He has a normal mood and affect. His behavior is normal. He exhibits abnormal recent memory and abnormal remote memory.        Data Review:   Recent Labs      08/07/17   0757  08/06/17   0643   NA  146*   --    K  3.8   --    BUN  15   --    CREA  1.16   --    GLU  123*   --    WBC   --   7.9   HGB   --   11.0*   HCT   --   31.8*   PLT   --   204         Intake/Output Summary (Last 24 hours) at 08/08/17 0924  Last data filed at 08/08/17 0537   Gross per 24 hour   Intake              360 ml   Output               14 ml   Net              346 ml     Current Facility-Administered Medications   Medication Dose Route Frequency    methylPREDNISolone (PF) (SOLU-MEDROL) injection 20 mg  20 mg IntraVENous Q12H    metoprolol tartrate (LOPRESSOR) tablet 25 mg  25 mg Oral Q12H    atorvastatin (LIPITOR) tablet 40 mg  40 mg Oral QHS    furosemide (LASIX) tablet 20 mg  20 mg Oral DAILY    ampicillin (OMNIPEN) 2 g in 0.9% sodium chloride (MBP/ADV) 50 mL MBP  2 g IntraVENous Q6H    enoxaparin (LOVENOX) injection 40 mg  40 mg SubCUTAneous Q24H    albuterol-ipratropium (DUO-NEB) 2.5 MG-0.5 MG/3 ML  3 mL Nebulization Q4H RT    budesonide (PULMICORT) 500 mcg/2 ml nebulizer suspension  500 mcg Nebulization BID RT    sodium chloride (NS) flush 5-10 mL  5-10 mL IntraVENous Q8H    sodium chloride (NS) flush 5-10 mL  5-10 mL IntraVENous PRN    acetaminophen (TYLENOL) tablet 650 mg  650 mg Oral Q4H PRN    ondansetron (ZOFRAN) injection 4 mg  4 mg IntraVENous Q4H PRN    aspirin delayed-release tablet 81 mg  81 mg Oral DAILY    baclofen (LIORESAL) tablet 10 mg  10 mg Oral BID    docusate sodium (COLACE) capsule 100 mg  100 mg Oral BID    ferrous sulfate tablet 325 mg  325 mg Oral ACB    finasteride (PROSCAR) tablet 5 mg  5 mg Oral DAILY    levothyroxine (SYNTHROID) tablet 100 mcg  100 mcg Oral ACB    nicotine (NICODERM CQ) 7 mg/24 hr patch 1 Patch  1 Patch TransDERmal Q24H    pantoprazole (PROTONIX) tablet 40 mg  40 mg Oral ACB           Assessment/Plan:     1. Community acquired pneumonia on antibiotics per primary team  2. Altered mental status/dementia? Patient not consentable for surgery family has been contacted they are not readily available but have communicated with nursing staff the past 24 hours.   3. Abdominal aortic aneurysm appears EVAR planned when family are available to discuss with surgeon. 4. Coronary artery disease remote history of coronary artery bypass grafting echocardiogram 8/2017 shows normal left ventricle systolic function  5. Perioperative assessment patient most likely moderate risk due to history of coronary disease however left ventricular systolic function is reassuring beta blocker was added 8/7/2017. Patient continues to be hypertensive additional medications may be necessary for further management. Will avoid aggressive blood pressure control prior to procedure to avoid episodes of hypotension.       Inocente Wyman MD  8/8/2017 9:24 AM

## 2017-08-08 NOTE — PROGRESS NOTES
Patient voices no concerns at this time. Patient resting in bed with no complaints at this time. Call light within reach and patient instructed to call if assistance is needed. Will continue to monitor.

## 2017-08-08 NOTE — PROGRESS NOTES
Hospitalist Progress Note    2017  Admit Date: 2017 12:17 AM   NAME: Francisco Funes   :  10/18/1931   MRN:  [id number]   Attending: Yvette Cadet MD  PCP:  None  Treatment Team: Attending Provider: Yvette Cadet MD; Utilization Review: Jewels Lambert RN; Care Manager: Tres Kinsey; Consulting Provider: Ruth Mary MD; Consulting Provider: Ramya Fierro MD; Hospitalist: Ethan Vivas MD    Full Code     SUBJECTIVE:   CC : CAP  Problems CAP, UTI enterococus faecalis, aortic abd aneurysm. COPD, and JEB     41-year-old  male with history of coronary artery disease and   distant coronary artery bypass grafting, systemic hypertension and hypothyroidism who was admitted to the hospitalist service   on 2017 with urinary tract infection and pneumonia. The record states that the patient was transferred from [facility] with a   diagnosis of urinary tract infection, fever, worsening renal failure, hypoxia, pneumonia. The patient was given IV fluids and   antibiotic therapy and further in-hospital treatment. He was found to have an enlarging   infrarenal abdominal aortic aneurysm greater than 6 cm with planned endovascular repair by Dr. Tessie Kyle, Vascular attending. He was consulted by [facility] Cardiology yesterday in preparation for surgery of which the date has not yet been set. 17- offers no new complaints. No results found for this or any previous visit (from the past 24 hour(s)).     No Known Allergies  Current Facility-Administered Medications   Medication Dose Route Frequency Provider Last Rate Last Dose    methylPREDNISolone (PF) (SOLU-MEDROL) injection 20 mg  20 mg IntraVENous Q12H Dania Stephen MD   20 mg at 17 0825    metoprolol tartrate (LOPRESSOR) tablet 25 mg  25 mg Oral Q12H Ramya Fierro MD   25 mg at 17 08    atorvastatin (LIPITOR) tablet 40 mg  40 mg Oral QHS Ramya Fierro MD   40 mg at 17    furosemide (LASIX) tablet 20 mg  20 mg Oral DAILY Pierre Rader MD   20 mg at 08/08/17 0826    ampicillin (OMNIPEN) 2 g in 0.9% sodium chloride (MBP/ADV) 50 mL MBP  2 g IntraVENous Q6H Joey James  mL/hr at 08/08/17 1117 2 g at 08/08/17 1117    enoxaparin (LOVENOX) injection 40 mg  40 mg SubCUTAneous Q24H Beltran Slade MD   40 mg at 08/08/17 0826    albuterol-ipratropium (DUO-NEB) 2.5 MG-0.5 MG/3 ML  3 mL Nebulization Q4H RT Loco Mathews NP   3 mL at 08/08/17 1147    budesonide (PULMICORT) 500 mcg/2 ml nebulizer suspension  500 mcg Nebulization BID RT Loco Mathews NP   500 mcg at 08/08/17 0813    sodium chloride (NS) flush 5-10 mL  5-10 mL IntraVENous Q8H Beltran Slade MD   5 mL at 08/08/17 0541    sodium chloride (NS) flush 5-10 mL  5-10 mL IntraVENous PRN Beltran Slade MD        acetaminophen (TYLENOL) tablet 650 mg  650 mg Oral Q4H PRN Beltran Slade MD   650 mg at 08/04/17 0412    ondansetron (ZOFRAN) injection 4 mg  4 mg IntraVENous Q4H PRN Beltran Slade MD        aspirin delayed-release tablet 81 mg  81 mg Oral DAILY Beltran Slade MD   81 mg at 08/08/17 2324    baclofen (LIORESAL) tablet 10 mg  10 mg Oral BID Beltran Slade MD   10 mg at 08/08/17 5566    docusate sodium (COLACE) capsule 100 mg  100 mg Oral BID Beltran Slade MD   100 mg at 08/08/17 0825    ferrous sulfate tablet 325 mg  325 mg Oral ACB Beltran Slade MD   325 mg at 08/08/17 0540    finasteride (PROSCAR) tablet 5 mg  5 mg Oral DAILY Beltran Slade MD   5 mg at 08/08/17 0825    levothyroxine (SYNTHROID) tablet 100 mcg  100 mcg Oral ACB Beltran Slade MD   100 mcg at 08/08/17 0540    nicotine (NICODERM CQ) 7 mg/24 hr patch 1 Patch  1 Patch TransDERmal Q24H Beltran Slade MD   1 Patch at 08/08/17 0257    pantoprazole (PROTONIX) tablet 40 mg  40 mg Oral ACB Beltran Slade MD   40 mg at 08/08/17 0540           Review of Systems negative with exception of pertinent positives noted above  PHYSICAL EXAM     Visit Vitals    /68    Pulse 67    Temp 98.3 °F (36.8 °C)    Resp 16    Ht 5' 4\" (1.626 m)    Wt 67 kg (147 lb 9.6 oz)    SpO2 95%    BMI 25.34 kg/m2      Temp (24hrs), Av.3 °F (36.8 °C), Min:98.1 °F (36.7 °C), Max:98.6 °F (37 °C)    Oxygen Therapy  O2 Sat (%): 95 % (17 1130)  Pulse via Oximetry: 75 beats per minute (17 2041)  O2 Device: Room air (17 0813)  O2 Flow Rate (L/min): 2 l/min (17 9642)    Intake/Output Summary (Last 24 hours) at 17 1300  Last data filed at 17 0537   Gross per 24 hour   Intake              360 ml   Output               14 ml   Net              346 ml      General:                    WNWD. A&O x3. NAD. Sitting up in bed just finishing breakfast.  Lungs:                                 Bibasilar rales with scattered wheezes in both lungs that clear with cough. Heart:                                  Regular rate and rhythm,  No murmur, rub, or gallop  Abdomen:                  Soft, Non distended, Non tender, Positive bowel sounds  Extremities:               No cyanosis, clubbing or edema. No obvious impairment. Neurologic:                No focal deficits.    Psych:             Normal mood and affect       DIAGNOSTIC STUDIES          ASSESSMENT      Active Hospital Problems    Diagnosis Date Noted    CAP (community acquired pneumonia) 2017    Altered mental status 2017    UTI (urinary tract infection) 2017    Fever 2017       Plan:  Continue iv ampicillin for 8 more days  EVAR in 8 days  CBC,CMP in am      DVT Prophylaxis:

## 2017-08-08 NOTE — PROGRESS NOTES
Pt resting in bed, alert and oriented, on 2 liters via nasal cannula, denies pain or distress, call light within reach.

## 2017-08-08 NOTE — PROGRESS NOTES
Patient stable with no complaints at this time. Patient sitting in recliner watching TV. Call light within reach and patient instructed to call if assistance is needed.   Report to be given to oncoming RN 7p-7a

## 2017-08-08 NOTE — PROGRESS NOTES
Patient resting in bed with no complaints at this time. Patient is alert and orientated with no distress noted. IV intact and patent with no s/s of infection noted. Respirations even and unlabored with heart rate regular. Patient unable to ambulate independently without assistance; needs x1 r/t weakness. Bed in low locked position with call light within reach. Patient instructed to call if assistance is needed. Will continue to monitor.

## 2017-08-09 LAB
ALBUMIN SERPL BCP-MCNC: 2.5 G/DL (ref 3.2–4.6)
ALBUMIN/GLOB SERPL: 0.6 {RATIO} (ref 1.2–3.5)
ALP SERPL-CCNC: 69 U/L (ref 50–136)
ALT SERPL-CCNC: 31 U/L (ref 12–65)
ANION GAP BLD CALC-SCNC: 11 MMOL/L (ref 7–16)
AST SERPL W P-5'-P-CCNC: 21 U/L (ref 15–37)
BACTERIA SPEC CULT: NORMAL
BACTERIA SPEC CULT: NORMAL
BILIRUB SERPL-MCNC: 0.2 MG/DL (ref 0.2–1.1)
BUN SERPL-MCNC: 25 MG/DL (ref 8–23)
CALCIUM SERPL-MCNC: 8.4 MG/DL (ref 8.3–10.4)
CHLORIDE SERPL-SCNC: 107 MMOL/L (ref 98–107)
CO2 SERPL-SCNC: 28 MMOL/L (ref 21–32)
CREAT SERPL-MCNC: 1.51 MG/DL (ref 0.8–1.5)
ERYTHROCYTE [DISTWIDTH] IN BLOOD BY AUTOMATED COUNT: 14.4 % (ref 11.9–14.6)
GLOBULIN SER CALC-MCNC: 4 G/DL (ref 2.3–3.5)
GLUCOSE SERPL-MCNC: 126 MG/DL (ref 65–100)
HCT VFR BLD AUTO: 33.5 % (ref 41.1–50.3)
HGB BLD-MCNC: 11.2 G/DL (ref 13.6–17.2)
MCH RBC QN AUTO: 30.8 PG (ref 26.1–32.9)
MCHC RBC AUTO-ENTMCNC: 33.4 G/DL (ref 31.4–35)
MCV RBC AUTO: 92 FL (ref 79.6–97.8)
PLATELET # BLD AUTO: 314 K/UL (ref 150–450)
PMV BLD AUTO: 10.2 FL (ref 10.8–14.1)
POTASSIUM SERPL-SCNC: 3.8 MMOL/L (ref 3.5–5.1)
PROT SERPL-MCNC: 6.5 G/DL (ref 6.3–8.2)
RBC # BLD AUTO: 3.64 M/UL (ref 4.23–5.67)
SERVICE CMNT-IMP: NORMAL
SERVICE CMNT-IMP: NORMAL
SODIUM SERPL-SCNC: 146 MMOL/L (ref 136–145)
WBC # BLD AUTO: 9.9 K/UL (ref 4.3–11.1)

## 2017-08-09 PROCEDURE — 74011000258 HC RX REV CODE- 258: Performed by: INTERNAL MEDICINE

## 2017-08-09 PROCEDURE — 74011000250 HC RX REV CODE- 250: Performed by: NURSE PRACTITIONER

## 2017-08-09 PROCEDURE — 94760 N-INVAS EAR/PLS OXIMETRY 1: CPT

## 2017-08-09 PROCEDURE — 74011250637 HC RX REV CODE- 250/637: Performed by: HOSPITALIST

## 2017-08-09 PROCEDURE — 80053 COMPREHEN METABOLIC PANEL: CPT | Performed by: INTERNAL MEDICINE

## 2017-08-09 PROCEDURE — 85027 COMPLETE CBC AUTOMATED: CPT | Performed by: INTERNAL MEDICINE

## 2017-08-09 PROCEDURE — 74011250636 HC RX REV CODE- 250/636: Performed by: INTERNAL MEDICINE

## 2017-08-09 PROCEDURE — 94640 AIRWAY INHALATION TREATMENT: CPT

## 2017-08-09 PROCEDURE — 97530 THERAPEUTIC ACTIVITIES: CPT

## 2017-08-09 PROCEDURE — 36415 COLL VENOUS BLD VENIPUNCTURE: CPT | Performed by: INTERNAL MEDICINE

## 2017-08-09 PROCEDURE — 74011250637 HC RX REV CODE- 250/637: Performed by: INTERNAL MEDICINE

## 2017-08-09 PROCEDURE — 74011250636 HC RX REV CODE- 250/636: Performed by: HOSPITALIST

## 2017-08-09 PROCEDURE — 65270000029 HC RM PRIVATE

## 2017-08-09 RX ORDER — HYDRALAZINE HYDROCHLORIDE 50 MG/1
50 TABLET, FILM COATED ORAL 3 TIMES DAILY
Status: DISCONTINUED | OUTPATIENT
Start: 2017-08-09 | End: 2017-08-22 | Stop reason: HOSPADM

## 2017-08-09 RX ADMIN — IPRATROPIUM BROMIDE AND ALBUTEROL SULFATE 3 ML: 2.5; .5 SOLUTION RESPIRATORY (INHALATION) at 16:43

## 2017-08-09 RX ADMIN — LEVOTHYROXINE SODIUM 100 MCG: 100 TABLET ORAL at 05:44

## 2017-08-09 RX ADMIN — FUROSEMIDE 20 MG: 20 TABLET ORAL at 08:17

## 2017-08-09 RX ADMIN — DOCUSATE SODIUM 100 MG: 100 CAPSULE, LIQUID FILLED ORAL at 16:15

## 2017-08-09 RX ADMIN — PANTOPRAZOLE SODIUM 40 MG: 40 TABLET, DELAYED RELEASE ORAL at 05:43

## 2017-08-09 RX ADMIN — IPRATROPIUM BROMIDE AND ALBUTEROL SULFATE 3 ML: 2.5; .5 SOLUTION RESPIRATORY (INHALATION) at 21:19

## 2017-08-09 RX ADMIN — Medication 5 ML: at 22:00

## 2017-08-09 RX ADMIN — ENOXAPARIN SODIUM 40 MG: 40 INJECTION SUBCUTANEOUS at 08:18

## 2017-08-09 RX ADMIN — BUDESONIDE 500 MCG: 0.5 SUSPENSION RESPIRATORY (INHALATION) at 21:19

## 2017-08-09 RX ADMIN — AMPICILLIN SODIUM 2 G: 2 INJECTION, POWDER, FOR SOLUTION INTRAMUSCULAR; INTRAVENOUS at 11:05

## 2017-08-09 RX ADMIN — METHYLPREDNISOLONE SODIUM SUCCINATE 20 MG: 40 INJECTION, POWDER, FOR SOLUTION INTRAMUSCULAR; INTRAVENOUS at 21:07

## 2017-08-09 RX ADMIN — METOPROLOL TARTRATE 25 MG: 25 TABLET ORAL at 08:17

## 2017-08-09 RX ADMIN — AMPICILLIN SODIUM 2 G: 2 INJECTION, POWDER, FOR SOLUTION INTRAMUSCULAR; INTRAVENOUS at 00:49

## 2017-08-09 RX ADMIN — BACLOFEN 10 MG: 10 TABLET ORAL at 08:17

## 2017-08-09 RX ADMIN — DOCUSATE SODIUM 100 MG: 100 CAPSULE, LIQUID FILLED ORAL at 08:17

## 2017-08-09 RX ADMIN — METOPROLOL TARTRATE 25 MG: 25 TABLET ORAL at 21:08

## 2017-08-09 RX ADMIN — HYDRALAZINE HYDROCHLORIDE 50 MG: 50 TABLET ORAL at 10:30

## 2017-08-09 RX ADMIN — Medication 5 ML: at 14:00

## 2017-08-09 RX ADMIN — FERROUS SULFATE TAB 325 MG (65 MG ELEMENTAL FE) 325 MG: 325 (65 FE) TAB at 05:44

## 2017-08-09 RX ADMIN — AMPICILLIN SODIUM 2 G: 2 INJECTION, POWDER, FOR SOLUTION INTRAMUSCULAR; INTRAVENOUS at 05:44

## 2017-08-09 RX ADMIN — METHYLPREDNISOLONE SODIUM SUCCINATE 20 MG: 40 INJECTION, POWDER, FOR SOLUTION INTRAMUSCULAR; INTRAVENOUS at 08:16

## 2017-08-09 RX ADMIN — IPRATROPIUM BROMIDE AND ALBUTEROL SULFATE 3 ML: 2.5; .5 SOLUTION RESPIRATORY (INHALATION) at 09:47

## 2017-08-09 RX ADMIN — AMPICILLIN SODIUM 2 G: 2 INJECTION, POWDER, FOR SOLUTION INTRAMUSCULAR; INTRAVENOUS at 16:15

## 2017-08-09 RX ADMIN — HYDRALAZINE HYDROCHLORIDE 50 MG: 50 TABLET ORAL at 16:15

## 2017-08-09 RX ADMIN — IPRATROPIUM BROMIDE AND ALBUTEROL SULFATE 3 ML: 2.5; .5 SOLUTION RESPIRATORY (INHALATION) at 03:50

## 2017-08-09 RX ADMIN — HYDRALAZINE HYDROCHLORIDE 50 MG: 50 TABLET ORAL at 21:07

## 2017-08-09 RX ADMIN — IPRATROPIUM BROMIDE AND ALBUTEROL SULFATE 3 ML: 2.5; .5 SOLUTION RESPIRATORY (INHALATION) at 12:51

## 2017-08-09 RX ADMIN — ASPIRIN 81 MG: 81 TABLET, COATED ORAL at 08:17

## 2017-08-09 RX ADMIN — FINASTERIDE 5 MG: 5 TABLET, FILM COATED ORAL at 08:17

## 2017-08-09 RX ADMIN — ATORVASTATIN CALCIUM 40 MG: 40 TABLET, FILM COATED ORAL at 21:07

## 2017-08-09 RX ADMIN — BACLOFEN 10 MG: 10 TABLET ORAL at 16:15

## 2017-08-09 RX ADMIN — Medication 5 ML: at 05:49

## 2017-08-09 RX ADMIN — BUDESONIDE 500 MCG: 0.5 SUSPENSION RESPIRATORY (INHALATION) at 09:48

## 2017-08-09 NOTE — PROGRESS NOTES
Patient voices no concerns at this time. Patient resting in recliner. PT suggest to take posey pad off during the day r/t patient is stable. Patient does need posey on at night. Call light within reach and patient instructed to call if assistance is needed. Will continue to monitor.

## 2017-08-09 NOTE — PROGRESS NOTES
Hospitalist Progress Note    2017  Admit Date: 2017 12:17 AM   NAME: Nils Ramirez   :  10/18/1931   MRN:  [id number]   Attending: Pavel Darby MD  PCP:  None  Treatment Team: Attending Provider: Pavel Darby MD; Utilization Review: Camille Wahl RN; Care Manager: Brittany Melendez; Consulting Provider: Katy Molina MD; Consulting Provider: Ivelisse Galicia MD; Hospitalist: Azul De Oliveira MD    Full Code     SUBJECTIVE:   CC : CAP  Problems CAP, UTI enterococus faecalis, aortic abd aneurysm. COPD, and JEB     71-year-old  male with history of coronary artery disease and   distant coronary artery bypass grafting, systemic hypertension and hypothyroidism who was admitted to the hospitalist service   on 2017 with urinary tract infection and pneumonia. The record states that the patient was transferred from [facility] with a   diagnosis of urinary tract infection, fever, worsening renal failure, hypoxia, pneumonia. The patient was given IV fluids and   antibiotic therapy and further in-hospital treatment. He was found to have an enlarging   infrarenal abdominal aortic aneurysm greater than 6 cm with planned endovascular repair by Dr. Governor Odonnell, Vascular attending.  He was consulted by [facility] yesterday in preparation for surgery of which the date has not yet been set.                Recent Results (from the past 24 hour(s))   CBC W/O DIFF    Collection Time: 17  7:01 AM   Result Value Ref Range    WBC 9.9 4.3 - 11.1 K/uL    RBC 3.64 (L) 4.23 - 5.67 M/uL    HGB 11.2 (L) 13.6 - 17.2 g/dL    HCT 33.5 (L) 41.1 - 50.3 %    MCV 92.0 79.6 - 97.8 FL    MCH 30.8 26.1 - 32.9 PG    MCHC 33.4 31.4 - 35.0 g/dL    RDW 14.4 11.9 - 14.6 %    PLATELET 927 798 - 083 K/uL    MPV 10.2 (L) 10.8 - 40.3 FL   METABOLIC PANEL, COMPREHENSIVE    Collection Time: 17  7:01 AM   Result Value Ref Range    Sodium 146 (H) 136 - 145 mmol/L    Potassium 3.8 3.5 - 5.1 mmol/L    Chloride 107 98 - 107 mmol/L    CO2 28 21 - 32 mmol/L    Anion gap 11 7 - 16 mmol/L    Glucose 126 (H) 65 - 100 mg/dL    BUN 25 (H) 8 - 23 MG/DL    Creatinine 1.51 (H) 0.8 - 1.5 MG/DL    GFR est AA 57 (L) >60 ml/min/1.73m2    GFR est non-AA 47 (L) >60 ml/min/1.73m2    Calcium 8.4 8.3 - 10.4 MG/DL    Bilirubin, total 0.2 0.2 - 1.1 MG/DL    ALT (SGPT) 31 12 - 65 U/L    AST (SGOT) 21 15 - 37 U/L    Alk.  phosphatase 69 50 - 136 U/L    Protein, total 6.5 6.3 - 8.2 g/dL    Albumin 2.5 (L) 3.2 - 4.6 g/dL    Globulin 4.0 (H) 2.3 - 3.5 g/dL    A-G Ratio 0.6 (L) 1.2 - 3.5         No Known Allergies  Current Facility-Administered Medications   Medication Dose Route Frequency Provider Last Rate Last Dose    hydrALAZINE (APRESOLINE) tablet 50 mg  50 mg Oral TID Genesis Lopez MD   50 mg at 08/09/17 1030    methylPREDNISolone (PF) (SOLU-MEDROL) injection 20 mg  20 mg IntraVENous Q12H Dane Martin MD   20 mg at 08/09/17 0816    metoprolol tartrate (LOPRESSOR) tablet 25 mg  25 mg Oral Q12H Yaima Alas MD   25 mg at 08/09/17 0817    atorvastatin (LIPITOR) tablet 40 mg  40 mg Oral QHS Yaima Alas MD   40 mg at 08/08/17 2240    ampicillin (OMNIPEN) 2 g in 0.9% sodium chloride (MBP/ADV) 50 mL MBP  2 g IntraVENous Q6H Lynda Raygoza  mL/hr at 08/09/17 0544 2 g at 08/09/17 0544    enoxaparin (LOVENOX) injection 40 mg  40 mg SubCUTAneous Q24H Jeri Mitchell MD   40 mg at 08/09/17 0818    albuterol-ipratropium (DUO-NEB) 2.5 MG-0.5 MG/3 ML  3 mL Nebulization Q4H RT Angelita Ac NP   3 mL at 08/09/17 1251    budesonide (PULMICORT) 500 mcg/2 ml nebulizer suspension  500 mcg Nebulization BID RT Angelita Ac NP   500 mcg at 08/09/17 0948    sodium chloride (NS) flush 5-10 mL  5-10 mL IntraVENous Q8H Jeri Mitchell MD   5 mL at 08/09/17 0549    sodium chloride (NS) flush 5-10 mL  5-10 mL IntraVENous PRN Jeri Mitchell MD        acetaminophen (TYLENOL) tablet 650 mg  650 mg Oral Q4H PRN Jeri Mitchell MD 650 mg at 17 0412    ondansetron (ZOFRAN) injection 4 mg  4 mg IntraVENous Q4H PRN Fabio Varela MD        aspirin delayed-release tablet 81 mg  81 mg Oral DAILY Fabio Varela MD   81 mg at 17 7295    baclofen (LIORESAL) tablet 10 mg  10 mg Oral BID Fabio Varela MD   10 mg at 17 0114    docusate sodium (COLACE) capsule 100 mg  100 mg Oral BID Fabio Varela MD   100 mg at 17 1495    ferrous sulfate tablet 325 mg  325 mg Oral ACB Fabio Varela MD   325 mg at 17 0544    finasteride (PROSCAR) tablet 5 mg  5 mg Oral DAILY Fabio Varela MD   5 mg at 17 6631    levothyroxine (SYNTHROID) tablet 100 mcg  100 mcg Oral CLEMENTINAB Fabio Varela MD   100 mcg at 17 0544    nicotine (NICODERM CQ) 7 mg/24 hr patch 1 Patch  1 Patch TransDERmal Q24H Fabio Varela MD   1 Patch at 17 0322    pantoprazole (PROTONIX) tablet 40 mg  40 mg Oral AMRSHALL Varela MD   40 mg at 17 0543           Review of Systems negative with exception of pertinent positives noted above  PHYSICAL EXAM     Visit Vitals    /60    Pulse 72    Temp 98.3 °F (36.8 °C)    Resp 18    Ht 5' 4\" (1.626 m)    Wt 67 kg (147 lb 9.6 oz)    SpO2 98%    BMI 25.34 kg/m2      Temp (24hrs), Av.2 °F (36.8 °C), Min:97.9 °F (36.6 °C), Max:98.5 °F (36.9 °C)    Oxygen Therapy  O2 Sat (%): 98 % (17 125)  Pulse via Oximetry: 78 beats per minute (17 125)  O2 Device: Room air (17 125)  O2 Flow Rate (L/min): 2 l/min (17 0833)    Intake/Output Summary (Last 24 hours) at 17 1407  Last data filed at 17 0530   Gross per 24 hour   Intake              120 ml   Output             1000 ml   Net             -880 ml      General: No acute distress  AA Ox3   Eyes  Anicteric, conjunctiva pink, EOM full, PERRL  Lungs:  CTA, normal respiratory effort  Heart:  RRR no  Murmur, gallops or rubs  Abdomen: Soft NT, ND, NHSM  Extremities: No cyanosis, clubbing or edema  Neurologic:  CN 2- 12 intact, no sensory or motor deficit   Skin   Warm dry,  good turgour, moist mucous membrane  Psych  Insight good affect normal mood good      DIAGNOSTIC STUDIES          ASSESSMENT      Active Hospital Problems    Diagnosis Date Noted    CAP (community acquired pneumonia) 08/01/2017    Altered mental status 08/01/2017    UTI (urinary tract infection) 08/01/2017    Fever 08/01/2017       Plan:  Clinically stable. Urine cult. Positive for Enterococus continue Ampicillin.     DVT Prophylaxis:

## 2017-08-09 NOTE — PROGRESS NOTES
Problem: Falls - Risk of  Goal: *Knowledge of fall prevention  Outcome: Resolved/Met Date Met:  08/09/17  Patient verbalizes fall prevention techniques.

## 2017-08-09 NOTE — PROGRESS NOTES
[facility] CARDIOLOGY PROGRESS NOTE           8/9/2017 9:24 AM    Admit Date: 8/1/2017      Subjective:     Patient has no complaints. No chest discomfort shortness of breath. He has remote history of coronary artery bypass grafting echocardiogram was completed. Review of Systems   Constitutional: Negative for weight loss. HENT: Positive for hearing loss. Eyes: Negative for blurred vision. Respiratory: Negative for cough. Cardiovascular: Negative for chest pain. Skin: Negative for rash. Neurological: Negative for dizziness. Endo/Heme/Allergies: Does not bruise/bleed easily. Psychiatric/Behavioral: Negative for suicidal ideas. Objective:      Vitals:    08/08/17 2350 08/09/17 0345 08/09/17 0350 08/09/17 0743   BP:  187/64  (!) 156/98   Pulse:  79  72   Resp:  18  18   Temp:  98.4 °F (36.9 °C)  98 °F (36.7 °C)   SpO2: 95% 94% 94% 95%   Weight:       Height:             Physical Exam   Constitutional: He is oriented to person, place, and time. He appears well-developed. HENT:   Head: Normocephalic and atraumatic. Eyes: Pupils are equal, round, and reactive to light. Neck: Normal range of motion. Cardiovascular: Normal rate. Murmur heard. Early systolic murmur is present with a grade of 1/6   Pulmonary/Chest: Effort normal.   Abdominal: Soft. He exhibits no distension. There is no tenderness. Musculoskeletal: He exhibits no edema. Neurological: He is alert and oriented to person, place, and time. No cranial nerve deficit. Skin: Skin is warm and dry. No rash noted. No erythema. Psychiatric: He has a normal mood and affect. His behavior is normal. He exhibits abnormal recent memory and abnormal remote memory.        Data Review:   Recent Labs      08/09/17   0701  08/07/17   0757   NA  146*  146*   K  3.8  3.8   BUN  25*  15   CREA  1.51*  1.16   GLU  126*  123*   WBC  9.9   --    HGB  11.2*   --    HCT  33.5*   --    PLT  314   --          Intake/Output Summary (Last 24 hours) at 08/09/17 0903  Last data filed at 08/09/17 0530   Gross per 24 hour   Intake              360 ml   Output             1000 ml   Net             -640 ml     Current Facility-Administered Medications   Medication Dose Route Frequency    methylPREDNISolone (PF) (SOLU-MEDROL) injection 20 mg  20 mg IntraVENous Q12H    metoprolol tartrate (LOPRESSOR) tablet 25 mg  25 mg Oral Q12H    atorvastatin (LIPITOR) tablet 40 mg  40 mg Oral QHS    furosemide (LASIX) tablet 20 mg  20 mg Oral DAILY    ampicillin (OMNIPEN) 2 g in 0.9% sodium chloride (MBP/ADV) 50 mL MBP  2 g IntraVENous Q6H    enoxaparin (LOVENOX) injection 40 mg  40 mg SubCUTAneous Q24H    albuterol-ipratropium (DUO-NEB) 2.5 MG-0.5 MG/3 ML  3 mL Nebulization Q4H RT    budesonide (PULMICORT) 500 mcg/2 ml nebulizer suspension  500 mcg Nebulization BID RT    sodium chloride (NS) flush 5-10 mL  5-10 mL IntraVENous Q8H    sodium chloride (NS) flush 5-10 mL  5-10 mL IntraVENous PRN    acetaminophen (TYLENOL) tablet 650 mg  650 mg Oral Q4H PRN    ondansetron (ZOFRAN) injection 4 mg  4 mg IntraVENous Q4H PRN    aspirin delayed-release tablet 81 mg  81 mg Oral DAILY    baclofen (LIORESAL) tablet 10 mg  10 mg Oral BID    docusate sodium (COLACE) capsule 100 mg  100 mg Oral BID    ferrous sulfate tablet 325 mg  325 mg Oral ACB    finasteride (PROSCAR) tablet 5 mg  5 mg Oral DAILY    levothyroxine (SYNTHROID) tablet 100 mcg  100 mcg Oral ACB    nicotine (NICODERM CQ) 7 mg/24 hr patch 1 Patch  1 Patch TransDERmal Q24H    pantoprazole (PROTONIX) tablet 40 mg  40 mg Oral ACB           Assessment/Plan:     1. Community acquired pneumonia on antibiotics per primary team  2. Altered mental status/dementia? Patient not consentable for surgery family has been contacted they are not readily available but have communicated with nursing staff the past 24 hours.   3. Abdominal aortic aneurysm appears EVAR planned when family are available to discuss with surgeon. Per nursing and social work notes difficult situation. Patient on antibiotics on this time. 4. Coronary artery disease remote history of coronary artery bypass grafting echocardiogram 8/2017 shows normal left ventricle systolic function. Mild AS moderate AI. 5. Perioperative assessment patient most likely moderate risk due to history of coronary disease however left ventricular systolic function is reassuring beta blocker was added 8/7/2017. 6. Hypertension uncontrolled patient was profoundly elevated systolic pressures unfortunately renal function is worsening over the past 24 hours creatinine 1.5. Patient will not be initiated on A/ARB. Initiate temporary blood pressure management with hydralazine. 7. Acute kidney injury stable fluid balance normal left ventricular systolic function Lasix discontinued. Reassess in 24 hours for more Comprehensive workup if renal function does not improve.       Jacqui Jama MD  8/9/2017 9:24 AM

## 2017-08-09 NOTE — PROGRESS NOTES
Pt resting in bed alert, denies pain or distress,cooperative care, on 2 liters of oxygen via nasal cannula, breathing even and unlabored, call light within reach.

## 2017-08-09 NOTE — PROGRESS NOTES
Planning for EVAR 8/16/2017  Only family to assist with consent is step-son, with whom patient has difficult relationship  Will involve risk management to assure validity of consent    AVSS  Patient sleeping, no further exam performed    A/P:  - echo 8/7/2017 with normal left ventricle systolic function  - pre-op risk assessment - appreciate cardiology  - IV ABX per ID  - will consult risk management to ensure patient's consent to surgery  - plan for EVAR 8/16/2017  - further plan per Dr. Curtis Agee, PA-C  Physician Assistant with Vascular Surgery Sam Yuan MD / Zohaib Peralta.  Tamiko Rossi MD / Kendra Tipton MD

## 2017-08-09 NOTE — PROGRESS NOTES
Patient stable with no complaints at this time. Patient is sitting on bed watching TV. Call light within reach and patient instructed to call if assistance is needed.   Report to be given to oncoming RN 7p-7a

## 2017-08-09 NOTE — PROGRESS NOTES
Patient in bed resting with no complaints at this time. Patient is alert and orientated with no distress noted. IV intact and patent with no s/s of infection noted. Respirations even and unlabored with heart rate regular. Patient unable to ambulate independently without assistance; needs x1 r/t weakness. Patient is becoming more steady with gait. Bed in low locked position with call light within reach. Patient instructed to call if assistance is needed. Will continue to monitor.

## 2017-08-09 NOTE — PROGRESS NOTES
Problem: Mobility Impaired (Adult and Pediatric)  Goal: *Acute Goals and Plan of Care (Insert Text)  STG:  (1.)Mr. Scheyder will move from supine to sit and sit to supine , scoot up and down and roll side to side with CONTACT GUARD ASSIST within 3 day(s). GOAL MET 8/4/2017  (2.)Mr. Scheyder will transfer from bed to chair and chair to bed with CONTACT GUARD ASSIST using the least restrictive device within 3 day(s). (3.)Mr. Scheyder will ambulate with CONTACT GUARD ASSIST for 30 feet with the least restrictive device within 3 day(s). GOAL MET 8/4/2017      LTG:  (1.)Mr. Scheyder will move from supine to sit and sit to supine , scoot up and down and roll side to side in bed with MODIFIED INDEPENDENCE within 7 day(s). (2.)Mr. Scheyder will transfer from bed to chair and chair to bed with MODIFIED INDEPENDENCE using the least restrictive device within 7 day(s). (3.)Mr. Scheyder will ambulate with SUPERVISION for 150+ feet with the least restrictive device within 7 day(s). PHYSICAL THERAPY: Daily Note, Treatment Day: 3rd and AM 8/9/2017  INPATIENT: Hospital Day: 9  Payor: Sarah Vieyra / Plan: [employer]NA MEDICARE CHOICE PPO/PFFS / Product Type: Managed Care Medicare /      NAME/AGE/GENDER: Collins Mack is a 80 y.o. male          PRIMARY DIAGNOSIS: Abdominal aortic aneurysm (AAA) without rupture ([facility] [address] [I71.4] CAP (community acquired pneumonia) CAP (community acquired pneumonia)  Procedure(s) (LRB):  AORTIC ABDOMINAL ANEURYSM REPAIR ENDOVASCULAR (EVAR)  GEN VS SPINAL ROOM 836 (N/A)     ICD-10: Treatment Diagnosis:       · Generalized Muscle Weakness (M62.81)  · Difficulty in walking, Not elsewhere classified (R26.2)   Precaution/Allergies:  Review of patient's allergies indicates no known allergies. ASSESSMENT:      Mr. Brendon Ramirez was supine and willing to walk. He got to the EOB without my help and a little difficulty. He stood up with heavy use of pushing his calves against the bed.   He walked the small loop on the floor with light use of the walker and no LOB. Good steady progress with mobility. Left up in the chair. This section established at most recent assessment   PROBLEM LIST (Impairments causing functional limitations):  1. Decreased Strength  2. Decreased ADL/Functional Activities  3. Decreased Transfer Abilities  4. Decreased Ambulation Ability/Technique  5. Decreased Balance  6. Decreased Activity Tolerance  7. Decreased Pacing Skills  8. Increased Fatigue  9. Increased Shortness of Breath  10. Decreased [place] with Home Exercise Program    INTERVENTIONS PLANNED: (Benefits and precautions of physical therapy have been discussed with the patient.)  1. Balance Exercise  2. Bed Mobility  3. Family Education  4. Gait Training  5. Home Exercise Program (HEP)  6. Neuromuscular Re-education/Strengthening  7. Range of Motion (ROM)  8. Therapeutic Activites  9. Therapeutic Exercise/Strengthening  10. Transfer Training  11. Group Therapy      TREATMENT PLAN: Frequency/Duration: 3 times a week for duration of hospital stay  Rehabilitation Potential For Stated Goals: FAIR      RECOMMENDED REHABILITATION/EQUIPMENT: (at time of discharge pending progress): Continue Skilled Therapy and Rehab. HISTORY:   History of Present Injury/Illness (Reason for Referral):  See H&P below  79 y/o male with history of HTN, hypothyroidism, \"renal disorder\" and dementia is sent in transfer from [address] [place] [address] with diagnosis of UTI, fever, worsening renal failure, hypoxemia and URI/pna. He is alert but a poor historian. His main complaint is of shivering. No family or visitors present to give more history. He states he lives with family. Workup at [facility] showed a UTI, temp 100.7, O2 sat 90% RA with PaO2 69. Cr up to 1.8 from 1.3. WBC ct 13.1, lactic acid 1.5 and .  Chest xray was reported as normal. Do not know what patient's mental status baseline is to know if he is having delerium from acute illness or not. He was given IVF, rocephin and azithromycin and transferred to [address] [place] [address]. Patient denies chest pain, shortness of breath, nausea or vomiting. Has has malaise, weakness, cough and congestion. Past Medical History/Comorbidities:   Mr. Cordie Denver  has a past medical history of CAD (coronary artery disease). Mr. Cordie Denver  has a past surgical history that includes cardiac surg procedure unlist.  Social History/Living Environment:   Home Environment: Private residence  # Steps to Enter: 6  Rails to Enter: Yes  Hand Rails : Bilateral  One/Two Story Residence: One story  Living Alone: Yes  Support Systems: Child(jessica)  Patient Expects to be Discharged to[de-identified] Private residence  Current DME Used/Available at Home: Rosemarie Ramos, rollator, Susi Jointer, straight, Shower chair  Tub or Shower Type: Tub/Shower combination  Prior Level of Function/Work/Activity:  Lives alone, use of rollator for gait, indep with ADLs, 0 falls. Number of Personal Factors/Comorbidities that affect the Plan of Care: 1-2: MODERATE COMPLEXITY   EXAMINATION:   Most Recent Physical Functioning:   Gross Assessment:                  Posture:     Balance:    Bed Mobility:  Supine to Sit: Modified independent  Wheelchair Mobility:     Transfers:  Sit to Stand: Stand-by asssistance;Contact guard assistance  Stand to Sit: Supervision  Gait:     Speed/[place]: Pace decreased (<100 feet/min)  Distance (ft): 200 Feet (ft)  Assistive Device: Walker, rolling  Ambulation - Level of Assistance: Stand-by asssistance       Body Structures Involved:  1. Heart  2. Lungs  3. Bones  4. Joints  5. Muscles Body Functions Affected:  1. Sensory/Pain  2. Cardio  3. Respiratory  4. Neuromusculoskeletal  5. Movement Related Activities and Participation Affected:  1. General Tasks and Demands  2. Mobility  3. Self Care  4. Domestic Life  5. Interpersonal Interactions and Relationships  6.  Community, Social and [place]   Number of elements that affect the Plan of Care: 4+: HIGH COMPLEXITY   CLINICAL PRESENTATION:   Presentation: Evolving clinical presentation with changing clinical characteristics: MODERATE COMPLEXITY   CLINICAL DECISION MAKIN [facility] Mobility Inpatient Short Form  How much difficulty does the patient currently have. .. Unable A Lot A Little None   1. Turning over in bed (including adjusting bedclothes, sheets and blankets)? [ ] 1   [ ] 2   [X] 3   [ ] 4   2. Sitting down on and standing up from a chair with arms ( e.g., wheelchair, bedside commode, etc.)   [ ] 1   [X] 2   [ ] 3   [ ] 4   3. Moving from lying on back to sitting on the side of the bed? [ ] 1   [ ] 2   [X] 3   [ ] 4   How much help from another person does the patient currently need. .. Total A Lot A Little None   4. Moving to and from a bed to a chair (including a wheelchair)? [ ] 1   [X] 2   [ ] 3   [ ] 4   5. Need to walk in hospital room? [ ] 1   [X] 2   [ ] 3   [ ] 4   6. Climbing 3-5 steps with a railing? [X] 1   [ ] 2   [ ] 3   [ ] 4   © , Trustees of [address] [place] [address], under license to [employer]. All rights reserved    Score:  Initial: 13 Most Recent: X (Date: -- )     Interpretation of Tool:  Represents activities that are increasingly more difficult (i.e. Bed mobility, Transfers, Gait).        Score 24 23 22-20 19-15 14-10 9-7 6       Modifier CH CI CJ CK CL CM CN         · Mobility - Walking and Moving Around:               - CURRENT STATUS:    CL - 60%-79% impaired, limited or restricted               - GOAL STATUS:           CK - 40%-59% impaired, limited or restricted               - D/C STATUS:                       ---------------To be determined---------------  Payor: HUMANA MEDICARE / Plan: [facility] HU[employer]NA MEDICARE CHOICE PPO/PFFS / Product Type: Managed Care Medicare /       Medical Necessity:     · Patient is expected to demonstrate progress in strength, range of motion, balance, coordination and functional technique to decrease assistance required with gait, transfers, and functional mobility. Reason for Services/Other Comments:  · Patient continues to require skilled intervention due to decreased strength, decreased balance, decreased functional tolerance, decreased cardiopulmonary endurance affecting participation in basic ADLs and functional tasks. Use of outcome tool(s) and clinical judgement create a POC that gives a: Questionable prediction of patient's progress: MODERATE COMPLEXITY                 TREATMENT:   (In addition to Assessment/Re-Assessment sessions the following treatments were rendered)   Pre-treatment Symptoms/Complaints:  No complaints  Pain: Initial:   Pain Intensity 1: 0  Post Session:  0/10      Therapeutic Activity: (    15 Minutes): Therapeutic activities including bed mobility training, transfer training from various surface heights, static/dynamic standing balance activities, ambulation on level ground to improve mobility, strength, balance and activity tolerance. Required minimal assist to promote static and dynamic balance in standing with light use of the walker. Braces/Orthotics/Lines/Etc:   · none  Treatment/Session Assessment:    · Response to Treatment:  Tolerated very well with sats 97% on RA after gait  · Interdisciplinary Collaboration:  · Physical Therapy Assistant and Registered Nurse  · After treatment position/precautions:  · Up in chair, Bed alarm/tab alert on, Bed/Chair-wheels locked, Bed in low position, Call light within reach and RN notified  · Compliance with Program/Exercises: Will assess as treatment progresses. · Recommendations/Intent for next treatment session: \"Next visit will focus on advancements to more challenging activities and reduction in assistance provided\".   Total Treatment Duration  PT Patient Time In/Time Out  Time In: 0915  Time Out: 0930     Aniyah Appiah PTA

## 2017-08-10 ENCOUNTER — APPOINTMENT (OUTPATIENT)
Dept: GENERAL RADIOLOGY | Age: 82
DRG: 853 | End: 2017-08-10
Attending: INTERNAL MEDICINE
Payer: MEDICARE

## 2017-08-10 PROCEDURE — 97530 THERAPEUTIC ACTIVITIES: CPT

## 2017-08-10 PROCEDURE — 71010 XR CHEST PORT: CPT

## 2017-08-10 PROCEDURE — 65660000000 HC RM CCU STEPDOWN

## 2017-08-10 PROCEDURE — 77010033678 HC OXYGEN DAILY

## 2017-08-10 PROCEDURE — 74011250636 HC RX REV CODE- 250/636: Performed by: HOSPITALIST

## 2017-08-10 PROCEDURE — 65270000029 HC RM PRIVATE

## 2017-08-10 PROCEDURE — 74011250637 HC RX REV CODE- 250/637: Performed by: HOSPITALIST

## 2017-08-10 PROCEDURE — 74011000258 HC RX REV CODE- 258: Performed by: INTERNAL MEDICINE

## 2017-08-10 PROCEDURE — 97116 GAIT TRAINING THERAPY: CPT

## 2017-08-10 PROCEDURE — 97110 THERAPEUTIC EXERCISES: CPT

## 2017-08-10 PROCEDURE — 74011250636 HC RX REV CODE- 250/636: Performed by: INTERNAL MEDICINE

## 2017-08-10 PROCEDURE — 94640 AIRWAY INHALATION TREATMENT: CPT

## 2017-08-10 PROCEDURE — 74011000250 HC RX REV CODE- 250: Performed by: NURSE PRACTITIONER

## 2017-08-10 PROCEDURE — 94760 N-INVAS EAR/PLS OXIMETRY 1: CPT

## 2017-08-10 PROCEDURE — 74011250637 HC RX REV CODE- 250/637: Performed by: INTERNAL MEDICINE

## 2017-08-10 RX ADMIN — Medication 10 ML: at 05:33

## 2017-08-10 RX ADMIN — IPRATROPIUM BROMIDE AND ALBUTEROL SULFATE 3 ML: 2.5; .5 SOLUTION RESPIRATORY (INHALATION) at 03:05

## 2017-08-10 RX ADMIN — BUDESONIDE 500 MCG: 0.5 SUSPENSION RESPIRATORY (INHALATION) at 07:41

## 2017-08-10 RX ADMIN — BUDESONIDE 500 MCG: 0.5 SUSPENSION RESPIRATORY (INHALATION) at 21:59

## 2017-08-10 RX ADMIN — ASPIRIN 81 MG: 81 TABLET, COATED ORAL at 08:30

## 2017-08-10 RX ADMIN — Medication 10 ML: at 21:08

## 2017-08-10 RX ADMIN — ENOXAPARIN SODIUM 40 MG: 40 INJECTION SUBCUTANEOUS at 08:30

## 2017-08-10 RX ADMIN — FINASTERIDE 5 MG: 5 TABLET, FILM COATED ORAL at 08:30

## 2017-08-10 RX ADMIN — FERROUS SULFATE TAB 325 MG (65 MG ELEMENTAL FE) 325 MG: 325 (65 FE) TAB at 05:34

## 2017-08-10 RX ADMIN — HYDRALAZINE HYDROCHLORIDE 50 MG: 50 TABLET ORAL at 21:07

## 2017-08-10 RX ADMIN — IPRATROPIUM BROMIDE AND ALBUTEROL SULFATE 3 ML: 2.5; .5 SOLUTION RESPIRATORY (INHALATION) at 21:59

## 2017-08-10 RX ADMIN — METOPROLOL TARTRATE 25 MG: 25 TABLET ORAL at 08:30

## 2017-08-10 RX ADMIN — AMPICILLIN SODIUM 2 G: 2 INJECTION, POWDER, FOR SOLUTION INTRAMUSCULAR; INTRAVENOUS at 11:03

## 2017-08-10 RX ADMIN — HYDRALAZINE HYDROCHLORIDE 50 MG: 50 TABLET ORAL at 16:51

## 2017-08-10 RX ADMIN — AMPICILLIN SODIUM 2 G: 2 INJECTION, POWDER, FOR SOLUTION INTRAMUSCULAR; INTRAVENOUS at 02:15

## 2017-08-10 RX ADMIN — METOPROLOL TARTRATE 25 MG: 25 TABLET ORAL at 21:07

## 2017-08-10 RX ADMIN — METHYLPREDNISOLONE SODIUM SUCCINATE 20 MG: 40 INJECTION, POWDER, FOR SOLUTION INTRAMUSCULAR; INTRAVENOUS at 21:07

## 2017-08-10 RX ADMIN — ATORVASTATIN CALCIUM 40 MG: 40 TABLET, FILM COATED ORAL at 21:07

## 2017-08-10 RX ADMIN — AMPICILLIN SODIUM 2 G: 2 INJECTION, POWDER, FOR SOLUTION INTRAMUSCULAR; INTRAVENOUS at 21:06

## 2017-08-10 RX ADMIN — IPRATROPIUM BROMIDE AND ALBUTEROL SULFATE 3 ML: 2.5; .5 SOLUTION RESPIRATORY (INHALATION) at 00:47

## 2017-08-10 RX ADMIN — IPRATROPIUM BROMIDE AND ALBUTEROL SULFATE 3 ML: 2.5; .5 SOLUTION RESPIRATORY (INHALATION) at 17:16

## 2017-08-10 RX ADMIN — METHYLPREDNISOLONE SODIUM SUCCINATE 20 MG: 40 INJECTION, POWDER, FOR SOLUTION INTRAMUSCULAR; INTRAVENOUS at 08:30

## 2017-08-10 RX ADMIN — BACLOFEN 10 MG: 10 TABLET ORAL at 16:51

## 2017-08-10 RX ADMIN — Medication 5 ML: at 14:00

## 2017-08-10 RX ADMIN — IPRATROPIUM BROMIDE AND ALBUTEROL SULFATE 3 ML: 2.5; .5 SOLUTION RESPIRATORY (INHALATION) at 07:41

## 2017-08-10 RX ADMIN — LEVOTHYROXINE SODIUM 100 MCG: 100 TABLET ORAL at 05:34

## 2017-08-10 RX ADMIN — HYDRALAZINE HYDROCHLORIDE 50 MG: 50 TABLET ORAL at 08:30

## 2017-08-10 RX ADMIN — DOCUSATE SODIUM 100 MG: 100 CAPSULE, LIQUID FILLED ORAL at 16:51

## 2017-08-10 RX ADMIN — BACLOFEN 10 MG: 10 TABLET ORAL at 08:30

## 2017-08-10 RX ADMIN — IPRATROPIUM BROMIDE AND ALBUTEROL SULFATE 3 ML: 2.5; .5 SOLUTION RESPIRATORY (INHALATION) at 11:50

## 2017-08-10 RX ADMIN — PANTOPRAZOLE SODIUM 40 MG: 40 TABLET, DELAYED RELEASE ORAL at 05:34

## 2017-08-10 RX ADMIN — DOCUSATE SODIUM 100 MG: 100 CAPSULE, LIQUID FILLED ORAL at 08:30

## 2017-08-10 NOTE — PROGRESS NOTES
Hospitalist Progress Note    8/10/2017  Admit Date: 2017 12:17 AM   NAME: Maria Del Rosario Pires   :  10/18/1931   MRN:  [id number]   Attending: Esme Aldrich MD  PCP:  None  Treatment Team: Attending Provider: Esme Aldrich MD; Utilization Review: Nissa Gregg RN; Care Manager: Leigh Ann Calvo [facility]; Consulting Provider: Erna William MD; Consulting Provider: Greer George MD; Hospitalist: Ana Moreno MD    Full Code     SUBJECTIVE:   CC : CAP  Problems CAP, UTI enterococus faecalis, aortic abd aneurysm. COPD, and JEB       80-year-old  male with history of coronary artery disease and   distant coronary artery bypass grafting, systemic hypertension and hypothyroidism who was admitted to the hospitalist service   on 2017 with urinary tract infection and pneumonia. The record states that the patient was transferred from [facility] with a   diagnosis of urinary tract infection, fever, worsening renal failure, hypoxia, pneumonia. The patient was given IV fluids and   antibiotic therapy and further in-hospital treatment. He was found to have an enlarging   infrarenal abdominal aortic aneurysm greater than 6 cm with planned endovascular repair by Dr. Ozzy Lopez, Vascular attending. He was consulted by [facility] yesterday in preparation for surgery of which the date has not yet been set. No results found for this or any previous visit (from the past 24 hour(s)).     No Known Allergies  Current Facility-Administered Medications   Medication Dose Route Frequency Provider Last Rate Last Dose    ampicillin (OMNIPEN) 2 g in 0.9% sodium chloride (MBP/ADV) 50 mL MBP  2 g IntraVENous Q8H Ana Moreno MD        hydrALAZINE (APRESOLINE) tablet 50 mg  50 mg Oral TID Johnella Sandhoff, MD   50 mg at 08/10/17 1651    methylPREDNISolone (PF) (SOLU-MEDROL) injection 20 mg  20 mg IntraVENous Q12H Zachary Xiao MD   20 mg at 08/10/17 0830    metoprolol tartrate (LOPRESSOR) tablet 25 mg 25 mg Oral Q12H Dewayne Goldberg, MD   25 mg at 08/10/17 0830    atorvastatin (LIPITOR) tablet 40 mg  40 mg Oral QHS Dewayne Goldberg, MD   40 mg at 08/09/17 2107    enoxaparin (LOVENOX) injection 40 mg  40 mg SubCUTAneous Q24H Jaden Palomino MD   40 mg at 08/10/17 0830    albuterol-ipratropium (DUO-NEB) 2.5 MG-0.5 MG/3 ML  3 mL Nebulization Q4H RT Paul Hand, NP   3 mL at 08/10/17 1150    budesonide (PULMICORT) 500 mcg/2 ml nebulizer suspension  500 mcg Nebulization BID RT Paul Hand, NP   500 mcg at 08/10/17 0741    sodium chloride (NS) flush 5-10 mL  5-10 mL IntraVENous Q8H Jaden Palomino MD   5 mL at 08/10/17 1400    sodium chloride (NS) flush 5-10 mL  5-10 mL IntraVENous PRN Jaden Palomino MD        acetaminophen (TYLENOL) tablet 650 mg  650 mg Oral Q4H PRN Jaden Palomino MD   650 mg at 08/04/17 0412    ondansetron (ZOFRAN) injection 4 mg  4 mg IntraVENous Q4H PRN Jaden Palomino MD        aspirin delayed-release tablet 81 mg  81 mg Oral DAILY Jaden Palomino MD   81 mg at 08/10/17 0830    baclofen (LIORESAL) tablet 10 mg  10 mg Oral BID Jaden Palomino MD   10 mg at 08/10/17 1651    docusate sodium (COLACE) capsule 100 mg  100 mg Oral BID Jaden Palomino MD   100 mg at 08/10/17 1651    ferrous sulfate tablet 325 mg  325 mg Oral ACB Jaden Palomino MD   325 mg at 08/10/17 0534    finasteride (PROSCAR) tablet 5 mg  5 mg Oral DAILY Jaden Palomino MD   5 mg at 08/10/17 0830    levothyroxine (SYNTHROID) tablet 100 mcg  100 mcg Oral ACB Jaden Palomino MD   100 mcg at 08/10/17 0534    nicotine (NICODERM CQ) 7 mg/24 hr patch 1 Patch  1 Patch TransDERmal Q24H Jaden Palomino MD   1 Patch at 08/10/17 0218    pantoprazole (PROTONIX) tablet 40 mg  40 mg Oral ACB Jaden Palomino MD   40 mg at 08/10/17 0534           Review of Systems negative with exception of pertinent positives noted above  PHYSICAL EXAM     Visit Vitals    /65    Pulse (!) 108    Temp 97.8 °F (36.6 °C)    Resp 18    Ht 5' 4\" (1.626 m)    Wt 67 kg (147 lb 9.6 oz)    SpO2 97%    BMI 25.34 kg/m2      Temp (24hrs), Av °F (36.7 °C), Min:97.7 °F (36.5 °C), Max:98.3 °F (36.8 °C)    Oxygen Therapy  O2 Sat (%): 97 % (08/10/17 1516)  Pulse via Oximetry: 97 beats per minute (08/10/17 1150)  O2 Device: Room air (08/10/17 1150)  O2 Flow Rate (L/min): 2 l/min (17 5514)    Intake/Output Summary (Last 24 hours) at 08/10/17 1708  Last data filed at 08/10/17 0654   Gross per 24 hour   Intake              175 ml   Output             1001 ml   Net             -826 ml      General: No acute distress  AA Ox3   Eyes  Anicteric, conjunctiva pink, EOM full, PERRL  Lungs:  CTA, normal respiratory effort  Heart:  RRR no  Murmur, gallops or rubs  Abdomen: Soft NT, ND, NHSM  Extremities: No cyanosis, clubbing or edema  Neurologic:  CN 2- 12 intact, no sensory or motor deficit   Skin   Warm dry,  good turgour, moist mucous membrane  Psych  Insight good affect normal mood good      DIAGNOSTIC STUDIES          ASSESSMENT      Active Hospital Problems    Diagnosis Date Noted    CAP (community acquired pneumonia)   xray and CT scan not consistent with PNA  Repeat CXR in the AM   2017    Altered mental status   2017    UTI (urinary tract infection)  Urine culture low colony count Enterococcus faecalis continue iv ampicillin  To be completed 8/15.   2017    Fever 2017       Plan:      DVT Prophylaxis:

## 2017-08-10 NOTE — PROGRESS NOTES
Patient stable sitting in recliner watching TV. Patient has no visual s/s of pain or discomfort. Call light within reach and patient instructed to call if assistance is needed.   Report to be given to oncoming RN 7p-7a

## 2017-08-10 NOTE — PROGRESS NOTES
Pt is resting quietly in bed. Pt is up and down to use restroom constantly. Only urinating 100-200 ml at a time (sometimes incontinent in brief). Pt calls for assistance. Call light is within reach. Will continue to monitor.

## 2017-08-10 NOTE — PROGRESS NOTES
Planning for EVAR 8/16/2017  Only family to assist with consent is step-son, with whom patient has difficult relationship  SAROJ Gillis will consult Risk Management to assure validity of consent  Discussed with patient, who was pleasant and nodded and said, \"Yeah, whatever is good. \" However, I'm not convinced he fully understands our recommendation and nature of surgery (language barrier? dementia?)     AVSS  General: A&O, no distress  Lungs: CTAB  Cardiac: RRR, soft systolic murmur  Abd: soft, non-tender, non-distended, +BS  UE: unremarkable, 2+ radial pulses  LE: warm, palpable DP pulses, NV intact         A/P:  - involve risk management to ensure patient's consent to surgery  - echo 8/7/2017 with normal left ventricle systolic function  - pre-op risk assessment - appreciate cardiology  - IV ABX per ID  - plan for EVAR 8/16/2017  - further plan per Dr. Bettye Sadler, PA-C  Physician Assistant with Vascular Surgery Gail Sauceda MD / Laurina Alpers.  Gilbert Landin MD / Abdoul Trejo MD

## 2017-08-10 NOTE — PROGRESS NOTES
Problem: Mobility Impaired (Adult and Pediatric)  Goal: *Acute Goals and Plan of Care (Insert Text)  STG:  (1.)Mr. Scheyder will move from supine to sit and sit to supine , scoot up and down and roll side to side with CONTACT GUARD ASSIST within 3 day(s). GOAL MET 8/4/2017  (2.)Mr. Scheyder will transfer from bed to chair and chair to bed with CONTACT GUARD ASSIST using the least restrictive device within 3 day(s). (3.)Mr. Scheyder will ambulate with CONTACT GUARD ASSIST for 30 feet with the least restrictive device within 3 day(s). GOAL MET 8/4/2017      LTG:  (1.)Mr. Scheyder will move from supine to sit and sit to supine , scoot up and down and roll side to side in bed with MODIFIED INDEPENDENCE within 7 day(s). (2.)Mr. Scheyder will transfer from bed to chair and chair to bed with MODIFIED INDEPENDENCE using the least restrictive device within 7 day(s). (3.)Mr. Scheyder will ambulate with SUPERVISION for 150+ feet with the least restrictive device within 7 day(s). PHYSICAL THERAPY: Daily Note, Treatment Day: 4th and PM 8/10/2017  INPATIENT: Hospital Day: 10  Payor: Portia Forde / Plan: [employer] MEDICARE CHOICE PPO/PFFS / Product Type: [employer] Medicare /      NAME/AGE/GENDER: Emilee Ceullar is a 80 y.o. male          PRIMARY DIAGNOSIS: Abdominal aortic aneurysm (AAA) without rupture ([facility] [address] [I71.4] CAP (community acquired pneumonia) CAP (community acquired pneumonia)  Procedure(s) (LRB):  AORTIC ABDOMINAL ANEURYSM REPAIR ENDOVASCULAR (EVAR)  GEN VS SPINAL ROOM 836 (N/A)     ICD-10: Treatment Diagnosis:       · Generalized Muscle Weakness (M62.81)  · Difficulty in walking, Not elsewhere classified (R26.2)   Precaution/Allergies:  Review of patient's allergies indicates no known allergies. ASSESSMENT:      Mr. Alana Plaza was sitting up in chair and willing to move with physical therapy. Pt had been sitting up for a good portion of the day already.  No complaints of pain on contact or after therapy. Pt walked in oneil with walker, some verbal cues for walker use and safety awareness with his gait. Pt making great progress with gait distance today, no losses of balance and no standing rest breaks during gait. Hope to continue to progress at next session. This section established at most recent assessment   PROBLEM LIST (Impairments causing functional limitations):  1. Decreased Strength  2. Decreased ADL/Functional Activities  3. Decreased Transfer Abilities  4. Decreased Ambulation Ability/Technique  5. Decreased Balance  6. Decreased Activity Tolerance  7. Decreased Pacing Skills  8. Increased Fatigue  9. Increased Shortness of Breath  10. Decreased [place] with Home Exercise Program    INTERVENTIONS PLANNED: (Benefits and precautions of physical therapy have been discussed with the patient.)  1. Balance Exercise  2. Bed Mobility  3. Family Education  4. Gait Training  5. Home Exercise Program (HEP)  6. Neuromuscular Re-education/Strengthening  7. Range of Motion (ROM)  8. Therapeutic Activites  9. Therapeutic Exercise/Strengthening  10. Transfer Training  11. Group Therapy      TREATMENT PLAN: Frequency/Duration: 3 times a week for duration of hospital stay  Rehabilitation Potential For Stated Goals: FAIR      RECOMMENDED REHABILITATION/EQUIPMENT: (at time of discharge pending progress): Continue Skilled Therapy and Rehab. HISTORY:   History of Present Injury/Illness (Reason for Referral):  See H&P below  79 y/o male with history of HTN, hypothyroidism, \"renal disorder\" and dementia is sent in transfer from [facility] with diagnosis of UTI, fever, worsening renal failure, hypoxemia and URI/pna. He is alert but a poor historian. His main complaint is of shivering. No family or visitors present to give more history. He states he lives with family. Workup at [facility] showed a UTI, temp 100.7, O2 sat 90% RA with PaO2 69. Cr up to 1.8 from 1.3.  WBC ct 13.1, lactic acid 1.5 and BNP 102. Chest xray was reported as normal. Do not know what patient's mental status baseline is to know if he is having delerium from acute illness or not. He was given IVF, rocephin and azithromycin and transferred to [address] [place] [address]. Patient denies chest pain, shortness of breath, nausea or vomiting. Has has malaise, weakness, cough and congestion. Past Medical History/Comorbidities:   Mr. Alana Plaza  has a past medical history of CAD (coronary artery disease). Mr. Alana Plaza  has a past surgical history that includes cardiac surg procedure unlist.  Social History/Living Environment:   Home Environment: Private residence  # Steps to Enter: 6  Rails to Enter: Yes  Hand Rails : Bilateral  One/Two Story Residence: One story  Living Alone: Yes  Support Systems: Child(jessica)  Patient Expects to be Discharged to[de-identified] Private residence  Current DME Used/Available at Home: [address], rollator, [address] [facility], [place], straight, Shower chair  Tub or Shower Type: Tub/Shower combination  Prior Level of Function/Work/Activity:  Lives alone, use of rollator for gait, indep with ADLs, 0 falls. Number of Personal Factors/Comorbidities that affect the Plan of Care: 1-2: MODERATE COMPLEXITY   EXAMINATION:   Most Recent Physical Functioning:   Gross Assessment:                  Posture:     Balance:  Sitting: Intact  Standing: Impaired  Standing - Static: Good  Standing - Dynamic : Fair (fair to fair+) Bed Mobility:  Supine to Sit:  (pt up in chair on contact)  Sit to Supine:  (pt stayed up in chair after therapy)  Wheelchair Mobility:     Transfers:  Sit to Stand: Stand-by asssistance  Stand to Sit: Supervision  Gait:     Base of Support: Narrowed  Speed/[place]: Pace decreased (<100 feet/min); Shuffled  Step Length: Left shortened;Right shortened  Gait Abnormalities: Decreased step clearance; Path deviations;Trunk sway increased  Distance (ft): 500 Feet (ft)  Assistive Device: Walker, rolling  Ambulation - Level of Assistance: Stand-by asssistance  Interventions: Safety awareness training;Verbal cues  Duration: 15 Minutes       Body Structures Involved:  1. Heart  2. Lungs  3. Bones  4. Joints  5. Muscles Body Functions Affected:  1. Sensory/Pain  2. Cardio  3. Respiratory  4. Neuromusculoskeletal  5. Movement Related Activities and Participation Affected:  1. General Tasks and Demands  2. Mobility  3. Self Care  4. Domestic Life  5. Interpersonal Interactions and Relationships  6. Community, Social and [place]   Number of elements that affect the Plan of Care: 4+: HIGH COMPLEXITY   CLINICAL PRESENTATION:   Presentation: Evolving clinical presentation with changing clinical characteristics: MODERATE COMPLEXITY   CLINICAL DECISION MAKIN [facility] Mobility Inpatient Short Form  How much difficulty does the patient currently have. .. Unable A Lot A Little None   1. Turning over in bed (including adjusting bedclothes, sheets and blankets)? [ ] 1   [ ] 2   [X] 3   [ ] 4   2. Sitting down on and standing up from a chair with arms ( e.g., wheelchair, bedside commode, etc.)   [ ] 1   [X] 2   [ ] 3   [ ] 4   3. Moving from lying on back to sitting on the side of the bed? [ ] 1   [ ] 2   [X] 3   [ ] 4   How much help from another person does the patient currently need. .. Total A Lot A Little None   4. Moving to and from a bed to a chair (including a wheelchair)? [ ] 1   [X] 2   [ ] 3   [ ] 4   5. Need to walk in hospital room? [ ] 1   [X] 2   [ ] 3   [ ] 4   6. Climbing 3-5 steps with a railing? [X] 1   [ ] 2   [ ] 3   [ ] 4   © , Trustees of [address] [place] [address] [address] [address], under license to [employer]. All rights reserved    Score:  Initial: 13 Most Recent: X (Date: -- )     Interpretation of Tool:  Represents activities that are increasingly more difficult (i.e. Bed mobility, Transfers, Gait).        Score 24 23 22-20 19-15 14-10 9-7 6       Modifier CH CI CJ CK CL CM CN         · Mobility - Walking and Moving Around:               - CURRENT STATUS:    CL - 60%-79% impaired, limited or restricted               - GOAL STATUS:           CK - 40%-59% impaired, limited or restricted               - D/C STATUS:                       ---------------To be determined---------------  Payor: HUMANA MEDICARE / Plan: [facility] HU[employer]NA MEDICARE CHOICE PPO/PFFS / Product Type: Managed Care Medicare /       Medical Necessity:     · Patient is expected to demonstrate progress in strength, range of motion, balance, coordination and functional technique to decrease assistance required with gait, transfers, and functional mobility. Reason for Services/Other Comments:  · Patient continues to require skilled intervention due to decreased strength, decreased balance, decreased functional tolerance, decreased cardiopulmonary endurance affecting participation in basic ADLs and functional tasks. Use of outcome tool(s) and clinical judgement create a POC that gives a: Questionable prediction of patient's progress: MODERATE COMPLEXITY                 TREATMENT:   (In addition to Assessment/Re-Assessment sessions the following treatments were rendered)   Pre-treatment Symptoms/Complaints:  No complaints  Pain: Initial:   Pain Intensity 1: 0  Post Session:  0/10      Gait Training (15 Minutes):  Gait training to improve and/or restore physical functioning as related to mobility and strength. Ambulated 500 Feet (ft) with Stand-by asssistance using a Walker, rolling and minimal Safety awareness training;Verbal cues related to their walker use and safety  to promote proper body alignment and promote proper body posture.       Braces/Orthotics/Lines/Etc:   · none  Treatment/Session Assessment:    · Response to Treatment:  Pt tolerated increase in distance nicely without any increase in shortness of breath  · Interdisciplinary Collaboration:  · Registered Nurse  · After treatment position/precautions:  · Up in chair, Bed alarm/tab alert on, Bed/Chair-wheels locked and Call light within reach  · Compliance with Program/Exercises: compliant all the time. · Recommendations/Intent for next treatment session: \"Next visit will focus on advancements to more challenging activities and reduction in assistance provided\".   Total Treatment Duration  PT Patient Time In/Time Out  Time In: 1525  Time Out: 174 [place], [place]

## 2017-08-10 NOTE — PROGRESS NOTES
Pt resting comfortably in bed with no distress noted. RRR. No SOB or pain is reported. Pt is AO X3. IV in left hand  is CDI. Pt is up with assistance only. Call light is within reach. Pt aware to call for all needs. Will continue to monitor.

## 2017-08-10 NOTE — PROGRESS NOTES
Problem: Falls - Risk of  Goal: *Absence of falls  Outcome: Resolved/Met Date Met:  08/10/17  Patient has had no falls since admission. Pt stated patient could ambulate independently during the day and x1 assist in the PM    Problem: Breathing Pattern - Ineffective  Goal: *Use of effective breathing techniques  Outcome: Resolved/Met Date Met:  08/10/17  Patient is using proper techniques if becoming short of breath. Goal: *PALLIATIVE CARE: Alleviation of Dyspnea  Outcome: Resolved/Met Date Met:  08/10/17  Patient no longer requires oxygen and has not complained or has any visual s/s of dyspnea.

## 2017-08-10 NOTE — PROGRESS NOTES
Patient voices no concerns at this time. Patient resting in bed with eyes closed. Call light within reach and patient instructed to call if assistance is needed. Will continue to monitor. New IV placed in left FA r/t IV in right arm leaking.

## 2017-08-10 NOTE — PROGRESS NOTES
Problem: Self Care Deficits Care Plan (Adult)  Goal: *Acute Goals and Plan of Care (Insert Text)  1. Patient will complete lower body bathing and dressing with modified independence and adaptive equipment as needed. 2. Patient will complete toileting with modified independence. 3. Patient will tolerate 23 minutes of OT treatment with less than 3 rest breaks to increase activity tolerance for ADLs. 4. Patient will complete functional transfers with modified independence and adaptive equipment as needed. Timeframe: 7 visits     Comments:       OCCUPATIONAL THERAPY: Daily Note, Treatment Day: 1st and PM    8/10/2017  INPATIENT: Hospital Day: 10  Payor: Portia Kidney / Plan: [facility] HUMANA MEDICARE CHOICE PPO/PFFS / Product Type: Managed Care Medicare /      NAME/AGE/GENDER: Emilee Cuellar is a 80 y.o. male          PRIMARY DIAGNOSIS:  Abdominal aortic aneurysm (AAA) without rupture ([facility] [address] [I71.4] CAP (community acquired pneumonia) CAP (community acquired pneumonia)  Procedure(s) (LRB):  AORTIC ABDOMINAL ANEURYSM REPAIR ENDOVASCULAR (EVAR)  GEN VS SPINAL ROOM 836 (N/A)     ICD-10: Treatment Diagnosis:        · Generalized Muscle Weakness (M62.81)   Precautions/Allergies:         Review of patient's allergies indicates no known allergies. ASSESSMENT:      Mr. Alana Plaza presents to hospital for above. Pt reports that he lives alone in a [facility], there are 3 KAYLEN with bilateral hand rails. Pt has access to a cane and rollator, but he mostly uses the rollator for functional mobility. He is typically independent/mod I with ADLs/IADLs. Pt is no longer driving. 5/39/7460 Pt presents in supine upon arrival. Pt agreeable to treatment with encouragement from RN and therapist. Pt completed functional mobility with a rolling walker and SBA in his room and in the hallway. Pt returned to his room and completed exercises. Pt was left up in the chair with belongings in reach.  Pt participated well and will continue to benefit from OT to increase progression toward above goals. This section established at most recent assessment   PROBLEM LIST (Impairments causing functional limitations):  1. Decreased Strength  2. Decreased ADL/Functional Activities  3. Decreased Transfer Abilities  4. Decreased Ambulation Ability/Technique  5. Decreased Balance  6. Decreased Activity Tolerance  7. Decreased Pacing Skills  8. Decreased Work Simplification/Energy Conservation Techniques  9. Increased Fatigue  10. Increased Shortness of Breath  11. Decreased [place] with Home Exercise Program  12. Decreased Cognition    INTERVENTIONS PLANNED: (Benefits and precautions of occupational therapy have been discussed with the patient.)  1. Activities of daily living training  2. Adaptive equipment training  3. Balance training  4. Group therapy  5. Therapeutic activity  6. Therapeutic exercise      TREATMENT PLAN: Frequency/Duration: Follow patient 3x/week to address above goals. Rehabilitation Potential For Stated Goals: GOOD      RECOMMENDED REHABILITATION/EQUIPMENT: (at time of discharge pending progress): Continue Skilled Therapy. OCCUPATIONAL PROFILE AND HISTORY:   History of Present Injury/Illness (Reason for Referral):  See H&P  Past Medical History/Comorbidities:   Mr. Germania Orta  has a past medical history of CAD (coronary artery disease).   Mr. Germania Orta  has a past surgical history that includes cardiac surg procedure unlist.  Social History/Living Environment:   Home Environment: Private residence  # Steps to Enter: 6  Rails to Enter: Yes  Hand Rails : Bilateral  One/Two Story Residence: One story  Living Alone: Yes  Support Systems: Child(jessica)  Patient Expects to be Discharged to[de-identified] Private residence  Current DME Used/Available at Home: Marcia Weeks, beba, Mya Cabrera, straight, Shower chair  Tub or Shower Type: Tub/Shower combination  Prior Level of Function/Work/Activity:  [place]/mod I with ADLs/IADLs  Personal Factors:          Sex:  male        Age:  80 y.o. Number of Personal Factors/Comorbidities that affect the Plan of Care: Expanded review of therapy/medical records (1-2):  MODERATE COMPLEXITY   ASSESSMENT OF OCCUPATIONAL PERFORMANCE[de-identified]   Activities of Daily Living:           Basic ADLs (From Assessment) Complex ADLs (From Assessment)   Basic ADL  Feeding: Independent  Oral Facial Hygiene/Grooming: Modified Independent  Bathing: Minimum assistance  Upper Body Dressing: Modified independent  Lower Body Dressing: Moderate assistance  Toileting: Minimum assistance Instrumental ADL  Meal Preparation: Moderate assistance  Homemaking: Maximum assistance  Medication Management: Supervision  Financial Management: Setup   Grooming/Bathing/Dressing Activities of Daily Living                             Bed/Mat Mobility  Supine to Sit:  (pt up in chair on contact)  Sit to Supine:  (pt stayed up in chair after therapy)  Sit to Stand: Stand-by asssistance          Most Recent Physical Functioning:   Gross Assessment:                  Posture:     Balance:  Sitting: Intact  Standing: Impaired  Standing - Static: Good  Standing - Dynamic : Fair (fair to fair+) Bed Mobility:  Supine to Sit:  (pt up in chair on contact)  Sit to Supine:  (pt stayed up in chair after therapy)  Wheelchair Mobility:     Transfers:  Sit to Stand: Stand-by asssistance  Stand to Sit: Supervision                    Patient Vitals for the past 6 hrs:   BP SpO2 Pulse   08/10/17 1119 151/89 96 % 73   08/10/17 1150 - 98 % -   08/10/17 1516 134/65 97 % (!) 108        Mental Status  Neurologic State: Alert  Orientation Level: Oriented X4  Cognition: Appropriate decision making, Appropriate for age attention/concentration, Appropriate safety awareness, Follows commands  Perception: Appears intact  Perseveration: No perseveration noted  Safety/Judgement: Fall prevention                               Physical Skills Involved:  1.  Range of Motion  2. Balance  3. Strength  4. Activity Tolerance  5. Gross Motor Control Cognitive Skills Affected (resulting in the inability to perform in a timely and safe manner):  1. Executive Function  2. Immediate Memory  3. Short Term Recall  4. Comprehension Psychosocial Skills Affected:  1. Habits/Routines  2. Environmental Adaptation   Number of elements that affect the Plan of Care: 5+:  HIGH COMPLEXITY   CLINICAL DECISION MAKIN[address] [place] [address] AM-PAC 6 Clicks   Daily Activity Inpatient Short Form  How much help from another person does the patient currently need. .. Total A Lot A Little None   1. Putting on and taking off regular lower body clothing?   [ ] 1   [X] 2   [ ] 3   [ ] 4   2. Bathing (including washing, rinsing, drying)? [ ] 1   [ ] 2   [X] 3   [ ] 4   3. Toileting, which includes using toilet, bedpan or urinal?   [ ] 1   [ ] 2   [X] 3   [ ] 4   4. Putting on and taking off regular upper body clothing?   [ ] 1   [ ] 2   [ ] 3   [X] 4   5. Taking care of personal grooming such as brushing teeth? [ ] 1   [ ] 2   [ ] 3   [X] 4   6. Eating meals? [ ] 1   [ ] 2   [ ] 3   [X] 4   © , Trustees of [address] [place] [address], under license to [employer]. All rights reserved    Score:  Initial: 20 Most Recent: X (Date: -- )     Interpretation of Tool:  Represents activities that are increasingly more difficult (i.e. Bed mobility, Transfers, Gait).        Score 24 23 22-20 19-15 14-10 9-7 6       Modifier CH CI CJ CK CL CM CN         · Self Care:               - CURRENT STATUS:    CJ - 20%-39% impaired, limited or restricted               - GOAL STATUS:           CI - 1%-19% impaired, limited or restricted               - D/C STATUS:                       ---------------To be determined---------------  Payor: HUMANA MEDICARE / Plan: [facility] HUMANA MEDICARE CHOICE PPO/PFFS / Product Type: Managed Care Medicare /       Medical Necessity:     · Patient is expected to demonstrate progress in strength, balance, coordination, functional technique and activity tolerance to improve safety during ADLs. Reason for Services/Other Comments:  · Patient continues to require skilled intervention due to medical complications. Use of outcome tool(s) and clinical judgement create a POC that gives a: MODERATE COMPLEXITY             TREATMENT:   (In addition to Assessment/Re-Assessment sessions the following treatments were rendered)      Pre-treatment Symptoms/Complaints:    Pain: Initial:   Pain Intensity 1: 0  Post Session:  same      Therapeutic Activity: (10 minutes): Therapeutic activities including Bed transfers, Chair transfers and static/dynamic standing to improve mobility, strength and balance. Required minimal verbal, safety cues to promote dynamic balance in standing. Therapeutic Exercise: (13 minutes):  Exercises completed with UE's through all planes to improve mobility and strength. Required minimal visual, verbal and manual cues to promote proper body mechanics. Progressed range as indicated. Braces/Orthotics/Lines/Etc:   · IV  · O2 Device: Nasal cannula  Treatment/Session Assessment:    · Response to Treatment:  Participated with encouragement. · Interdisciplinary Collaboration:  · Certified Occupational Therapy Assistant and Registered Nurse  · After treatment position/precautions:  · Up in chair  · Bed alarm/tab alert on  · Bed/Chair-wheels locked  · Call light within reach  · Compliance with Program/Exercises: compliant all of the time today. · Recommendations/Intent for next treatment session: \"Next visit will focus on advancements to more challenging activities and reduction in assistance provided\".   Total Treatment Duration:OT Patient Time In/Time Out  Time In: 1400  Time Out: 400 [facility]

## 2017-08-10 NOTE — PROGRESS NOTES
[facility] CARDIOLOGY PROGRESS NOTE           8/10/2017 10:03 AM    Admit Date: 8/1/2017      Subjective:   No CP, resting in chair. EF normal on echo. ROS:  Cardiovascular:  As noted above    Objective:      Vitals:    08/10/17 0307 08/10/17 0350 08/10/17 0735 08/10/17 0742   BP:  152/57 162/84    Pulse:  87 72    Resp:  20 18    Temp:  97.9 °F (36.6 °C) 98.1 °F (36.7 °C)    SpO2: 95% 93% 92% 97%   Weight:       Height:           Physical Exam:  General-No Acute Distress  Neck- supple, no JVD  CV- regular rate and rhythm no MRG  Lung- clear bilaterally  Abd- soft, nontender, nondistended  Ext- no edema bilaterally. Skin- warm and dry    Data Review:   Recent Labs      08/09/17   0701   NA  146*   K  3.8   BUN  25*   CREA  1.51*   GLU  126*   WBC  9.9   HGB  11.2*   HCT  33.5*   PLT  314       Assessment/Plan:     Principal Problem:    CAP (community acquired pneumonia) (8/1/2017): per primary team    Active Problems:    Altered mental status (8/1/2017)      UTI (urinary tract infection) (8/1/2017): per primary team      Fever (8/1/2017)    ARF:  Check AM labs, follow BMP and Mg    CAD: no CP, EF normal on the echo. Place on remote tele, follow. Continue statin, ASA and BB. Avg risk for surgery, we will follow. AI:  EF normal, AI moderate. Follow in the future.        Arthur Yin DO  8/10/2017 10:03 AM

## 2017-08-10 NOTE — PROGRESS NOTES
Pt has been up and down to use the bathroom (urinating) all night. Pt is only putting out 100 to 200 mL at a time. Bladder scan this morning after latest void gave range of >125 to >190. Pt is in no distress and states that he does not feel that he has to use the restroom any more. Will inform Am nurse and physician on call. Will continue to monitor.

## 2017-08-10 NOTE — PROGRESS NOTES
Patient in bed resting with no complaints at this time. Patient is alert and orientated with no distress noted. IV intact and patent with no s/s of infection noted. Respirations even and unlabored with heart rate regular. Patient able to ambulate independently without assistance during daytime per PT. Bed in low locked position with call light within reach. Patient instructed to call if assistance is needed. Will continue to monitor.

## 2017-08-11 PROBLEM — J18.9 HCAP (HEALTHCARE-ASSOCIATED PNEUMONIA): Status: ACTIVE | Noted: 2017-08-11

## 2017-08-11 LAB
ALBUMIN SERPL BCP-MCNC: 2.5 G/DL (ref 3.2–4.6)
ALBUMIN/GLOB SERPL: 0.6 {RATIO} (ref 1.2–3.5)
ALP SERPL-CCNC: 73 U/L (ref 50–136)
ALT SERPL-CCNC: 28 U/L (ref 12–65)
ANION GAP BLD CALC-SCNC: 10 MMOL/L (ref 7–16)
AST SERPL W P-5'-P-CCNC: 20 U/L (ref 15–37)
BACTERIA SPEC CULT: NORMAL
BILIRUB SERPL-MCNC: 0.2 MG/DL (ref 0.2–1.1)
BUN SERPL-MCNC: 28 MG/DL (ref 8–23)
CALCIUM SERPL-MCNC: 8.2 MG/DL (ref 8.3–10.4)
CHLORIDE SERPL-SCNC: 108 MMOL/L (ref 98–107)
CO2 SERPL-SCNC: 27 MMOL/L (ref 21–32)
CREAT SERPL-MCNC: 1.66 MG/DL (ref 0.8–1.5)
ERYTHROCYTE [DISTWIDTH] IN BLOOD BY AUTOMATED COUNT: 14.9 % (ref 11.9–14.6)
GLOBULIN SER CALC-MCNC: 3.9 G/DL (ref 2.3–3.5)
GLUCOSE SERPL-MCNC: 115 MG/DL (ref 65–100)
HCT VFR BLD AUTO: 35.3 % (ref 41.1–50.3)
HGB BLD-MCNC: 11.6 G/DL (ref 13.6–17.2)
MAGNESIUM SERPL-MCNC: 2.2 MG/DL (ref 1.8–2.4)
MCH RBC QN AUTO: 30.6 PG (ref 26.1–32.9)
MCHC RBC AUTO-ENTMCNC: 32.9 G/DL (ref 31.4–35)
MCV RBC AUTO: 93.1 FL (ref 79.6–97.8)
PLATELET # BLD AUTO: 378 K/UL (ref 150–450)
PMV BLD AUTO: 10.3 FL (ref 10.8–14.1)
POTASSIUM SERPL-SCNC: 3.9 MMOL/L (ref 3.5–5.1)
PROT SERPL-MCNC: 6.4 G/DL (ref 6.3–8.2)
RBC # BLD AUTO: 3.79 M/UL (ref 4.23–5.67)
SERVICE CMNT-IMP: NORMAL
SODIUM SERPL-SCNC: 145 MMOL/L (ref 136–145)
WBC # BLD AUTO: 12.9 K/UL (ref 4.3–11.1)

## 2017-08-11 PROCEDURE — 74011250637 HC RX REV CODE- 250/637: Performed by: INTERNAL MEDICINE

## 2017-08-11 PROCEDURE — 85027 COMPLETE CBC AUTOMATED: CPT | Performed by: INTERNAL MEDICINE

## 2017-08-11 PROCEDURE — 74011250637 HC RX REV CODE- 250/637: Performed by: HOSPITALIST

## 2017-08-11 PROCEDURE — 77030027138 HC INCENT SPIROMETER -A

## 2017-08-11 PROCEDURE — 94760 N-INVAS EAR/PLS OXIMETRY 1: CPT

## 2017-08-11 PROCEDURE — 74011250636 HC RX REV CODE- 250/636: Performed by: INTERNAL MEDICINE

## 2017-08-11 PROCEDURE — 80053 COMPREHEN METABOLIC PANEL: CPT | Performed by: INTERNAL MEDICINE

## 2017-08-11 PROCEDURE — 74011000258 HC RX REV CODE- 258: Performed by: INTERNAL MEDICINE

## 2017-08-11 PROCEDURE — 94640 AIRWAY INHALATION TREATMENT: CPT

## 2017-08-11 PROCEDURE — 74011000250 HC RX REV CODE- 250: Performed by: NURSE PRACTITIONER

## 2017-08-11 PROCEDURE — 83735 ASSAY OF MAGNESIUM: CPT | Performed by: INTERNAL MEDICINE

## 2017-08-11 PROCEDURE — 36415 COLL VENOUS BLD VENIPUNCTURE: CPT | Performed by: INTERNAL MEDICINE

## 2017-08-11 PROCEDURE — 65660000000 HC RM CCU STEPDOWN

## 2017-08-11 PROCEDURE — 74011250636 HC RX REV CODE- 250/636: Performed by: HOSPITALIST

## 2017-08-11 RX ORDER — ENOXAPARIN SODIUM 100 MG/ML
30 INJECTION SUBCUTANEOUS EVERY 24 HOURS
Status: DISCONTINUED | OUTPATIENT
Start: 2017-08-12 | End: 2017-08-15

## 2017-08-11 RX ADMIN — PANTOPRAZOLE SODIUM 40 MG: 40 TABLET, DELAYED RELEASE ORAL at 05:51

## 2017-08-11 RX ADMIN — FINASTERIDE 5 MG: 5 TABLET, FILM COATED ORAL at 08:38

## 2017-08-11 RX ADMIN — IPRATROPIUM BROMIDE AND ALBUTEROL SULFATE 3 ML: 2.5; .5 SOLUTION RESPIRATORY (INHALATION) at 15:56

## 2017-08-11 RX ADMIN — ATORVASTATIN CALCIUM 40 MG: 40 TABLET, FILM COATED ORAL at 21:15

## 2017-08-11 RX ADMIN — IPRATROPIUM BROMIDE AND ALBUTEROL SULFATE 3 ML: 2.5; .5 SOLUTION RESPIRATORY (INHALATION) at 21:04

## 2017-08-11 RX ADMIN — Medication 10 ML: at 08:41

## 2017-08-11 RX ADMIN — BACLOFEN 10 MG: 10 TABLET ORAL at 17:04

## 2017-08-11 RX ADMIN — DOCUSATE SODIUM 100 MG: 100 CAPSULE, LIQUID FILLED ORAL at 08:38

## 2017-08-11 RX ADMIN — BUDESONIDE 500 MCG: 0.5 SUSPENSION RESPIRATORY (INHALATION) at 21:04

## 2017-08-11 RX ADMIN — DOCUSATE SODIUM 100 MG: 100 CAPSULE, LIQUID FILLED ORAL at 17:03

## 2017-08-11 RX ADMIN — IPRATROPIUM BROMIDE AND ALBUTEROL SULFATE 3 ML: 2.5; .5 SOLUTION RESPIRATORY (INHALATION) at 23:30

## 2017-08-11 RX ADMIN — Medication 10 ML: at 05:50

## 2017-08-11 RX ADMIN — IPRATROPIUM BROMIDE AND ALBUTEROL SULFATE 3 ML: 2.5; .5 SOLUTION RESPIRATORY (INHALATION) at 08:08

## 2017-08-11 RX ADMIN — ENOXAPARIN SODIUM 40 MG: 40 INJECTION SUBCUTANEOUS at 08:39

## 2017-08-11 RX ADMIN — METOPROLOL TARTRATE 25 MG: 25 TABLET ORAL at 21:16

## 2017-08-11 RX ADMIN — PIPERACILLIN SODIUM,TAZOBACTAM SODIUM 4.5 G: 4; .5 INJECTION, POWDER, FOR SOLUTION INTRAVENOUS at 22:25

## 2017-08-11 RX ADMIN — AMPICILLIN SODIUM 2 G: 2 INJECTION, POWDER, FOR SOLUTION INTRAMUSCULAR; INTRAVENOUS at 05:49

## 2017-08-11 RX ADMIN — Medication 10 ML: at 21:18

## 2017-08-11 RX ADMIN — BACLOFEN 10 MG: 10 TABLET ORAL at 08:38

## 2017-08-11 RX ADMIN — IPRATROPIUM BROMIDE AND ALBUTEROL SULFATE 3 ML: 2.5; .5 SOLUTION RESPIRATORY (INHALATION) at 03:42

## 2017-08-11 RX ADMIN — FERROUS SULFATE TAB 325 MG (65 MG ELEMENTAL FE) 325 MG: 325 (65 FE) TAB at 05:52

## 2017-08-11 RX ADMIN — METHYLPREDNISOLONE SODIUM SUCCINATE 20 MG: 40 INJECTION, POWDER, FOR SOLUTION INTRAMUSCULAR; INTRAVENOUS at 08:38

## 2017-08-11 RX ADMIN — IPRATROPIUM BROMIDE AND ALBUTEROL SULFATE 3 ML: 2.5; .5 SOLUTION RESPIRATORY (INHALATION) at 11:55

## 2017-08-11 RX ADMIN — METOPROLOL TARTRATE 25 MG: 25 TABLET ORAL at 08:38

## 2017-08-11 RX ADMIN — HYDRALAZINE HYDROCHLORIDE 50 MG: 50 TABLET ORAL at 21:15

## 2017-08-11 RX ADMIN — BUDESONIDE 500 MCG: 0.5 SUSPENSION RESPIRATORY (INHALATION) at 08:08

## 2017-08-11 RX ADMIN — HYDRALAZINE HYDROCHLORIDE 50 MG: 50 TABLET ORAL at 17:04

## 2017-08-11 RX ADMIN — LEVOTHYROXINE SODIUM 100 MCG: 100 TABLET ORAL at 05:51

## 2017-08-11 RX ADMIN — ASPIRIN 81 MG: 81 TABLET, COATED ORAL at 08:38

## 2017-08-11 RX ADMIN — AMPICILLIN SODIUM 2 G: 2 INJECTION, POWDER, FOR SOLUTION INTRAMUSCULAR; INTRAVENOUS at 13:31

## 2017-08-11 RX ADMIN — HYDRALAZINE HYDROCHLORIDE 50 MG: 50 TABLET ORAL at 08:38

## 2017-08-11 NOTE — PROGRESS NOTES
[facility] CARDIOLOGY PROGRESS NOTE           8/11/2017 10:03 AM    Admit Date: 8/1/2017      Subjective:   No CP, resting in chair. EF normal on echo. Feeling well now. No edema    ROS:  Cardiovascular:  As noted above    Objective:      Vitals:    08/10/17 2202 08/10/17 2343 08/11/17 0250 08/11/17 0718   BP:  123/56 145/76 137/57   Pulse:  84 91 80   Resp:  19 22 18   Temp:  98.2 °F (36.8 °C) 98.9 °F (37.2 °C) 97.6 °F (36.4 °C)   SpO2: 96% 93% 95% 95%   Weight:       Height:           Physical Exam:  General-No Acute Distress  Neck- supple, no JVD  CV- regular rate and rhythm no MRG  Lung- clear bilaterally with dec BS in the bases  Abd- soft, nontender, nondistended  Ext- no edema bilaterally. Skin- warm and dry    Data Review:   Recent Labs      08/11/17   0652  08/11/17   0625  08/09/17   0701   NA  145   --   146*   K  3.9   --   3.8   MG  2.2   --    --    BUN  28*   --   25*   CREA  1.66*   --   1.51*   GLU  115*   --   126*   WBC   --   12.9*  9.9   HGB   --   11.6*  11.2*   HCT   --   35.3*  33.5*   PLT   --   378  314       Assessment/Plan:     Principal Problem:    CAP (community acquired pneumonia) (8/1/2017): per primary team    Active Problems:    Altered mental status (8/1/2017)      UTI (urinary tract infection) (8/1/2017): per primary team      Fever (8/1/2017)    ARF:  Check AM labs, follow BMP and Mg, consult renal?  Defer to primary team.     CAD: no CP, EF normal on the echo. Follow on remote tele, follow. Continue statin, ASA and BB. Avg risk for surgery, we will follow. AI:  EF normal, AI moderate. Follow in the future. Sinus on tele.        Imani Goldstein DO  8/11/2017 9:30AM

## 2017-08-11 NOTE — PROGRESS NOTES
Problem: Nutrition Deficit  Goal: *Optimize nutritional status  Nutrition LOS Note:   Assessment  Diet order(s): Cardiac  Food,Nutrition, and Pertinent History: Patient presents with no acute nutrition risk factors identified by malnutrition screening tool upon admission. Patient with a h/o CAD. The patient reports that his appetite is doing well and denies any po difficulties at this time. The patient reports no nutrition related questions or concerns. Anthropometrics: Height: 5' 4\" (162.6 cm), Weight Source: Bed, Weight: 67 kg (147 lb 9.6 oz), Body mass index is 25.34 kg/(m^2). BMI class of normal weight for age >71. Macronutrient Needs:  · EER:  4036-4450 kcal /day (25-30 kcal/kg actual BW)  · EPR:  59-71 grams protein/day (1-1.2 grams/kg IBW)  Intake/Comparative Standards:  Average intake for past 10 day(s)/14 recorded meal(s): 96%. This potentially meets ~100% of kcal and ~100% of protein needs     Nutrition Diagnosis: No nutrition diagnosis at this time     Intervention:   Meals and snacks: Continue current diet. Nutrition Discharge Plan: No nutrition needs identified at this time. Ofelia Villalpando [address], [address] [place] [address],    [phone]

## 2017-08-11 NOTE — PROGRESS NOTES
Hospitalist Progress Note    2017  Admit Date: 2017 12:17 AM   NAME: Naya Ortiz   :  10/18/1931   MRN:  [id number]   Attending: Camille Figueroa MD  PCP:  None  Treatment Team: Attending Provider: Camille Figueroa MD; Utilization Review: Jagdeep Cardoso RN; Care Manager: Kelley [facility]; Consulting Provider: Fela Shields MD; Consulting Provider: Abigail Officer, MD; Hospitalist: Heather Ochoa MD    Full Code     SUBJECTIVE:     CC : CAP  Problems CAP, UTI enterococus faecalis, aortic abd aneurysm. COPD, and JEB       80-year-old  male with history of coronary artery disease and   distant coronary artery bypass grafting, systemic hypertension and hypothyroidism who was admitted to the hospitalist service   on 2017 with urinary tract infection and pneumonia. The record states that the patient was transferred from [facility] with a   diagnosis of urinary tract infection, fever, worsening renal failure, hypoxia, pneumonia. The patient was given IV fluids and   antibiotic therapy and further in-hospital treatment. He was found to have an enlarging   infrarenal abdominal aortic aneurysm greater than 6 cm with planned endovascular repair by Dr. Madhav Euceda, Vascular attending. He was consulted by [facility] Cardiology yesterday in preparation for surgery of which the date has not yet been set.                        Recent Results (from the past 24 hour(s))   CBC W/O DIFF    Collection Time: 17  6:25 AM   Result Value Ref Range    WBC 12.9 (H) 4.3 - 11.1 K/uL    RBC 3.79 (L) 4.23 - 5.67 M/uL    HGB 11.6 (L) 13.6 - 17.2 g/dL    HCT 35.3 (L) 41.1 - 50.3 %    MCV 93.1 79.6 - 97.8 FL    MCH 30.6 26.1 - 32.9 PG    MCHC 32.9 31.4 - 35.0 g/dL    RDW 14.9 (H) 11.9 - 14.6 %    PLATELET 028 445 - 955 K/uL    MPV 10.3 (L) 10.8 - 27.7 FL   METABOLIC PANEL, COMPREHENSIVE    Collection Time: 17  6:52 AM   Result Value Ref Range    Sodium 145 136 - 145 mmol/L    Potassium 3.9 3.5 - 5.1 mmol/L Chloride 108 (H) 98 - 107 mmol/L    CO2 27 21 - 32 mmol/L    Anion gap 10 7 - 16 mmol/L    Glucose 115 (H) 65 - 100 mg/dL    BUN 28 (H) 8 - 23 MG/DL    Creatinine 1.66 (H) 0.8 - 1.5 MG/DL    GFR est AA 51 (L) >60 ml/min/1.73m2    GFR est non-AA 42 (L) >60 ml/min/1.73m2    Calcium 8.2 (L) 8.3 - 10.4 MG/DL    Bilirubin, total 0.2 0.2 - 1.1 MG/DL    ALT (SGPT) 28 12 - 65 U/L    AST (SGOT) 20 15 - 37 U/L    Alk.  phosphatase 73 50 - 136 U/L    Protein, total 6.4 6.3 - 8.2 g/dL    Albumin 2.5 (L) 3.2 - 4.6 g/dL    Globulin 3.9 (H) 2.3 - 3.5 g/dL    A-G Ratio 0.6 (L) 1.2 - 3.5     MAGNESIUM    Collection Time: 08/11/17  6:52 AM   Result Value Ref Range    Magnesium 2.2 1.8 - 2.4 mg/dL       No Known Allergies  Current Facility-Administered Medications   Medication Dose Route Frequency Provider Last Rate Last Dose    ampicillin (OMNIPEN) 2 g in 0.9% sodium chloride (MBP/ADV) 50 mL MBP  2 g IntraVENous Q8H Sophie Mayes  mL/hr at 08/11/17 1331 2 g at 08/11/17 1331    hydrALAZINE (APRESOLINE) tablet 50 mg  50 mg Oral TID Rachell Hughes MD   50 mg at 08/11/17 0838    methylPREDNISolone (PF) (SOLU-MEDROL) injection 20 mg  20 mg IntraVENous Q12H Maureen Yung MD   20 mg at 08/11/17 0838    metoprolol tartrate (LOPRESSOR) tablet 25 mg  25 mg Oral Q12H Johnathan Becerra MD   25 mg at 08/11/17 0838    atorvastatin (LIPITOR) tablet 40 mg  40 mg Oral QHS Johnathan Becerra MD   40 mg at 08/10/17 2107    enoxaparin (LOVENOX) injection 40 mg  40 mg SubCUTAneous Q24H Nciole Linton MD   40 mg at 08/11/17 0839    albuterol-ipratropium (DUO-NEB) 2.5 MG-0.5 MG/3 ML  3 mL Nebulization Q4H RT Rodrigo London NP   3 mL at 08/11/17 1155    budesonide (PULMICORT) 500 mcg/2 ml nebulizer suspension  500 mcg Nebulization BID RT Rodrigo London NP   500 mcg at 08/11/17 0808    sodium chloride (NS) flush 5-10 mL  5-10 mL IntraVENous Q8H Nicole Linton MD   10 mL at 08/11/17 0841    sodium chloride (NS) flush 5-10 mL 5-10 mL IntraVENous PRN Carly Garcia MD        acetaminophen (TYLENOL) tablet 650 mg  650 mg Oral Q4H PRN Carly Garcia MD   650 mg at 17 0412    ondansetron [facility]) injection 4 mg  4 mg IntraVENous Q4H PRN Carly Garcia MD        aspirin delayed-release tablet 81 mg  81 mg Oral DAILY Carly Garcia MD   81 mg at 17 3607    baclofen (LIORESAL) tablet 10 mg  10 mg Oral BID Carly Garcia MD   10 mg at 17 1878    docusate sodium (COLACE) capsule 100 mg  100 mg Oral BID Carly Garcia MD   100 mg at 17 9966    ferrous sulfate tablet 325 mg  325 mg Oral ACB Carly Garcia MD   325 mg at 17 4715    finasteride (PROSCAR) tablet 5 mg  5 mg Oral DAILY Carly Garcia MD   5 mg at 17 3013    levothyroxine (SYNTHROID) tablet 100 mcg  100 mcg Oral MARSHALL Garcia MD   100 mcg at 17 0551    nicotine (NICODERM CQ) 7 mg/24 hr patch 1 Patch  1 Patch TransDERmal Q24H Calry Garcia MD   1 Patch at 08/10/17 0218    pantoprazole (PROTONIX) tablet 40 mg  40 mg Oral ACALEXI Garcia MD   40 mg at 17 0551           Review of Systems negative with exception of pertinent positives noted above  PHYSICAL EXAM     Visit Vitals    /51    Pulse 84    Temp 97.9 °F (36.6 °C)    Resp 17    Ht 5' 4\" (1.626 m)    Wt 67 kg (147 lb 9.6 oz)    SpO2 94%    BMI 25.34 kg/m2      Temp (24hrs), Av.1 °F (36.7 °C), Min:97.6 °F (36.4 °C), Max:98.9 °F (37.2 °C)    Oxygen Therapy  O2 Sat (%): 94 % (17 1155)  Pulse via Oximetry: 68 beats per minute (17 1155)  O2 Device: Room air (17 1155)  O2 Flow Rate (L/min): 2 l/min (17 0833)    Intake/Output Summary (Last 24 hours) at 17 1356  Last data filed at 17 1220   Gross per 24 hour   Intake              440 ml   Output              350 ml   Net               90 ml      General: No acute distress  AA Ox3   Eyes  Anicteric, conjunctiva pink, EOM full, PERRL  Lungs:  CTA, normal respiratory effort  Heart:  RRR no  Murmur, gallops or rubs  Abdomen: Soft NT, ND, NHSM  Extremities: No cyanosis, clubbing or edema  Neurologic:  CN 2- 12 intact, no sensory or motor deficit   Skin   Warm dry,  good turgour, moist mucous membrane  Psych  Insight good affect normal mood good      DIAGNOSTIC STUDIES      XR CHEST PORT (Final result) Result time: 08/10/17 18:16:19     Final result     Impression:     IMPRESSION: Interstitial prominence which may represent edema. Small patchy  density partially obscures right hemidiaphragm. There are sternal wires. ASSESSMENT      Active Hospital Problems    Diagnosis Date Noted    HCAP (healthcare-associated pneumonia) 08/11/2017    CAP (community acquired pneumonia) 08/01/2017    Altered mental status 08/01/2017    UTI (urinary tract infection) 08/01/2017    Fever 08/01/2017       Plan:  D/c ampicillin  Start vancomycin  Start zosyn  To cover GNR and MRSA  Start incentive spirometry [id number] awake.   CBC in am    DVT Prophylaxis:

## 2017-08-11 NOTE — PROGRESS NOTES
Pt is resting quietly in bed. RRR. No distress noted. IV in left forearm CDI. Call light is within reach. Will continue to monitor.

## 2017-08-11 NOTE — PROGRESS NOTES
Patient without complaints   has contacted Risk Management for directions to proceed for consent for planned for EVAR 8/16/2017. Bryan Ramirez PA-C  Physician Assistant with Vascular Surgery Nhi Watt MD / Asif Cavazos.  Joshua Newberry MD / Natasha Becerra MD

## 2017-08-11 NOTE — PROGRESS NOTES
Pharmacokinetic Consult to Pharmacist    Alphonsosanchez Robison is a 80 y.o. male being broadened to vancomycin and pip/tazo for HCAP. Height: 5' 4\" (162.6 cm)  Weight: 67 kg (147 lb 9.6 oz)  Lab Results   Component Value Date/Time    BUN 28 08/11/2017 06:52 AM    Creatinine 1.66 08/11/2017 06:52 AM    WBC 12.9 08/11/2017 06:25 AM      Estimated Creatinine Clearance: 27.2 mL/min (based on Cr of 1.66). CULTURES:  All Micro Results     Procedure Component Value Units Date/Time    CULTURE, BLOOD [[id number]] Collected:  08/06/17 1310    Order Status:  Completed Specimen:  Blood from Blood Updated:  08/11/17 1150     Special Requests: LEFT ANTECUBITAL        Culture result: NO GROWTH 5 DAYS       CULTURE, BLOOD [[id number]] Collected:  08/04/17 1231    Order Status:  Completed Specimen:  Blood from Blood Updated:  08/09/17 0726     Special Requests: RIGHT HAND        Culture result: NO GROWTH 5 DAYS       CULTURE, BLOOD [[id number]] Collected:  08/04/17 1224    Order Status:  Completed Specimen:  Blood from Blood Updated:  08/09/17 0726     Special Requests: LEFT WRIST        Culture result: NO GROWTH 5 DAYS       CULTURE, URINE [[id number]]  (Abnormal)  (Susceptibility) Collected:  08/04/17 1710    Order Status:  Completed Specimen:  Urine from Cath Urine Updated:  08/08/17 0739     Special Requests: NO SPECIAL REQUESTS        Culture result:         3000 COLONIES/mL ENTEROCOCCUS FAECALIS GROUP D (A)            Day 1 of vancomycin. Goal trough is 15-20. Vancomycin dose initiated at 1000 mg IV q24h. Levels will be ordered as clinically indicated. Pharmacy will continue to follow. Please call with any questions.     Thank you,  Yandel Flores, PharmD  Clinical Pharmacist  [phone]

## 2017-08-11 NOTE — PROGRESS NOTES
Assumed care of patient. Assessment completed and documented, see docflow. Patient A/O, resting quietly in bed. NAD noted at present. Respirations even and non labored. Patient verbalized understanding to call for needs. Call light within reach. Bed alarm in place. Will continue to monitor.

## 2017-08-11 NOTE — PROGRESS NOTES
Sw called Risk Management regarding upcoming surgery. Risk Management advised a discussion with MD and pt regarding the procedure and the risks involved and getting pt to name a POA in the event that pt can't make decisions. Ira called  and requested POA application to be delivered to pt's room.

## 2017-08-11 NOTE — PROGRESS NOTES
[employer] for [place] received request to offer assistance with [address]. Spoke with nurse to ensure that patient was sufficiently alert and oriented to proceed with consult. Upon my arrival to his room Mr. Kyra Schlatter appeared to be sleeping. Therefore, I left a [phone] [facility] document with nurse for patient to review later. Please call the  if patient desires further assistance. Rev.  Vincent Fernandes MDiv, BS  Board Certified

## 2017-08-12 LAB
ALBUMIN SERPL BCP-MCNC: 2.7 G/DL (ref 3.2–4.6)
ALBUMIN/GLOB SERPL: 0.7 {RATIO} (ref 1.2–3.5)
ALP SERPL-CCNC: 82 U/L (ref 50–136)
ALT SERPL-CCNC: 25 U/L (ref 12–65)
ANION GAP BLD CALC-SCNC: 7 MMOL/L (ref 7–16)
AST SERPL W P-5'-P-CCNC: 18 U/L (ref 15–37)
BILIRUB SERPL-MCNC: 0.3 MG/DL (ref 0.2–1.1)
BUN SERPL-MCNC: 28 MG/DL (ref 8–23)
CALCIUM SERPL-MCNC: 8.6 MG/DL (ref 8.3–10.4)
CHLORIDE SERPL-SCNC: 109 MMOL/L (ref 98–107)
CO2 SERPL-SCNC: 27 MMOL/L (ref 21–32)
CREAT SERPL-MCNC: 1.54 MG/DL (ref 0.8–1.5)
ERYTHROCYTE [DISTWIDTH] IN BLOOD BY AUTOMATED COUNT: 15 % (ref 11.9–14.6)
GLOBULIN SER CALC-MCNC: 4 G/DL (ref 2.3–3.5)
GLUCOSE SERPL-MCNC: 118 MG/DL (ref 65–100)
HCT VFR BLD AUTO: 38.3 % (ref 41.1–50.3)
HGB BLD-MCNC: 12.6 G/DL (ref 13.6–17.2)
MAGNESIUM SERPL-MCNC: 2.3 MG/DL (ref 1.8–2.4)
MCH RBC QN AUTO: 30.9 PG (ref 26.1–32.9)
MCHC RBC AUTO-ENTMCNC: 32.9 G/DL (ref 31.4–35)
MCV RBC AUTO: 93.9 FL (ref 79.6–97.8)
PLATELET # BLD AUTO: 425 K/UL (ref 150–450)
PMV BLD AUTO: 10.2 FL (ref 10.8–14.1)
POTASSIUM SERPL-SCNC: 4.1 MMOL/L (ref 3.5–5.1)
PROT SERPL-MCNC: 6.7 G/DL (ref 6.3–8.2)
RBC # BLD AUTO: 4.08 M/UL (ref 4.23–5.67)
SODIUM SERPL-SCNC: 143 MMOL/L (ref 136–145)
WBC # BLD AUTO: 14.2 K/UL (ref 4.3–11.1)

## 2017-08-12 PROCEDURE — 74011000250 HC RX REV CODE- 250: Performed by: NURSE PRACTITIONER

## 2017-08-12 PROCEDURE — 94760 N-INVAS EAR/PLS OXIMETRY 1: CPT

## 2017-08-12 PROCEDURE — 80053 COMPREHEN METABOLIC PANEL: CPT | Performed by: INTERNAL MEDICINE

## 2017-08-12 PROCEDURE — 74011250637 HC RX REV CODE- 250/637: Performed by: INTERNAL MEDICINE

## 2017-08-12 PROCEDURE — 65660000000 HC RM CCU STEPDOWN

## 2017-08-12 PROCEDURE — 94640 AIRWAY INHALATION TREATMENT: CPT

## 2017-08-12 PROCEDURE — 83735 ASSAY OF MAGNESIUM: CPT | Performed by: INTERNAL MEDICINE

## 2017-08-12 PROCEDURE — 85027 COMPLETE CBC AUTOMATED: CPT | Performed by: INTERNAL MEDICINE

## 2017-08-12 PROCEDURE — 36415 COLL VENOUS BLD VENIPUNCTURE: CPT | Performed by: INTERNAL MEDICINE

## 2017-08-12 PROCEDURE — 74011250636 HC RX REV CODE- 250/636: Performed by: INTERNAL MEDICINE

## 2017-08-12 PROCEDURE — 74011250637 HC RX REV CODE- 250/637: Performed by: HOSPITALIST

## 2017-08-12 PROCEDURE — 74011000258 HC RX REV CODE- 258: Performed by: INTERNAL MEDICINE

## 2017-08-12 RX ADMIN — METOPROLOL TARTRATE 25 MG: 25 TABLET ORAL at 08:03

## 2017-08-12 RX ADMIN — DOCUSATE SODIUM 100 MG: 100 CAPSULE, LIQUID FILLED ORAL at 16:09

## 2017-08-12 RX ADMIN — HYDRALAZINE HYDROCHLORIDE 50 MG: 50 TABLET ORAL at 16:09

## 2017-08-12 RX ADMIN — IPRATROPIUM BROMIDE AND ALBUTEROL SULFATE 3 ML: 2.5; .5 SOLUTION RESPIRATORY (INHALATION) at 11:28

## 2017-08-12 RX ADMIN — ASPIRIN 81 MG: 81 TABLET, COATED ORAL at 08:03

## 2017-08-12 RX ADMIN — IPRATROPIUM BROMIDE AND ALBUTEROL SULFATE 3 ML: 2.5; .5 SOLUTION RESPIRATORY (INHALATION) at 21:13

## 2017-08-12 RX ADMIN — METHYLPREDNISOLONE SODIUM SUCCINATE 20 MG: 40 INJECTION, POWDER, FOR SOLUTION INTRAMUSCULAR; INTRAVENOUS at 08:03

## 2017-08-12 RX ADMIN — PIPERACILLIN SODIUM,TAZOBACTAM SODIUM 4.5 G: 4; .5 INJECTION, POWDER, FOR SOLUTION INTRAVENOUS at 15:00

## 2017-08-12 RX ADMIN — HYDRALAZINE HYDROCHLORIDE 50 MG: 50 TABLET ORAL at 08:03

## 2017-08-12 RX ADMIN — BUDESONIDE 500 MCG: 0.5 SUSPENSION RESPIRATORY (INHALATION) at 07:47

## 2017-08-12 RX ADMIN — VANCOMYCIN HYDROCHLORIDE 1000 MG: 1 INJECTION, POWDER, LYOPHILIZED, FOR SOLUTION INTRAVENOUS at 12:00

## 2017-08-12 RX ADMIN — BACLOFEN 10 MG: 10 TABLET ORAL at 16:10

## 2017-08-12 RX ADMIN — METOPROLOL TARTRATE 25 MG: 25 TABLET ORAL at 22:19

## 2017-08-12 RX ADMIN — FINASTERIDE 5 MG: 5 TABLET, FILM COATED ORAL at 08:03

## 2017-08-12 RX ADMIN — BACLOFEN 10 MG: 10 TABLET ORAL at 08:03

## 2017-08-12 RX ADMIN — FERROUS SULFATE TAB 325 MG (65 MG ELEMENTAL FE) 325 MG: 325 (65 FE) TAB at 05:56

## 2017-08-12 RX ADMIN — Medication 10 ML: at 22:19

## 2017-08-12 RX ADMIN — PIPERACILLIN SODIUM,TAZOBACTAM SODIUM 4.5 G: 4; .5 INJECTION, POWDER, FOR SOLUTION INTRAVENOUS at 22:24

## 2017-08-12 RX ADMIN — IPRATROPIUM BROMIDE AND ALBUTEROL SULFATE 3 ML: 2.5; .5 SOLUTION RESPIRATORY (INHALATION) at 16:27

## 2017-08-12 RX ADMIN — DOCUSATE SODIUM 100 MG: 100 CAPSULE, LIQUID FILLED ORAL at 08:03

## 2017-08-12 RX ADMIN — PIPERACILLIN SODIUM,TAZOBACTAM SODIUM 4.5 G: 4; .5 INJECTION, POWDER, FOR SOLUTION INTRAVENOUS at 05:56

## 2017-08-12 RX ADMIN — Medication 5 ML: at 14:00

## 2017-08-12 RX ADMIN — LEVOTHYROXINE SODIUM 100 MCG: 100 TABLET ORAL at 05:56

## 2017-08-12 RX ADMIN — Medication 10 ML: at 05:55

## 2017-08-12 RX ADMIN — BUDESONIDE 500 MCG: 0.5 SUSPENSION RESPIRATORY (INHALATION) at 21:13

## 2017-08-12 RX ADMIN — ENOXAPARIN SODIUM 30 MG: 30 INJECTION SUBCUTANEOUS at 08:03

## 2017-08-12 RX ADMIN — PANTOPRAZOLE SODIUM 40 MG: 40 TABLET, DELAYED RELEASE ORAL at 05:56

## 2017-08-12 RX ADMIN — IPRATROPIUM BROMIDE AND ALBUTEROL SULFATE 3 ML: 2.5; .5 SOLUTION RESPIRATORY (INHALATION) at 04:15

## 2017-08-12 RX ADMIN — METHYLPREDNISOLONE SODIUM SUCCINATE 20 MG: 40 INJECTION, POWDER, FOR SOLUTION INTRAMUSCULAR; INTRAVENOUS at 22:19

## 2017-08-12 RX ADMIN — ATORVASTATIN CALCIUM 40 MG: 40 TABLET, FILM COATED ORAL at 22:18

## 2017-08-12 RX ADMIN — IPRATROPIUM BROMIDE AND ALBUTEROL SULFATE 3 ML: 2.5; .5 SOLUTION RESPIRATORY (INHALATION) at 07:47

## 2017-08-12 RX ADMIN — HYDRALAZINE HYDROCHLORIDE 50 MG: 50 TABLET ORAL at 22:18

## 2017-08-12 NOTE — PROGRESS NOTES
Patient voices no concerns at this time. Patient is relaxing in recliner with no complaints at this time. Call light within reach and patient instructed to call if assistance is needed. Will continue to monitor.

## 2017-08-12 NOTE — PROGRESS NOTES
Called home number for son which is disconnected. I also called the cell phone and left a message to call me with name and number. Will monitor.

## 2017-08-12 NOTE — PROGRESS NOTES
Pt is lying quietly in bed. No SOB or pain is reported. No distress is noted. No needs voiced. Pt is pleasantly confused. Bed alarm is intact and working. Will continue to monitor.

## 2017-08-12 NOTE — PROGRESS NOTES
Patient stable with no complaints at this time. Patient resting in bed with no visual s/s of pain or discomfort. Call light within reach and patient instructed to call if assistance is needed.   Report to be given to oncoming RN 7p-7a

## 2017-08-12 NOTE — PROGRESS NOTES
Patient in bed resting with no complaints at this time. Patient is alert and orientated with some confusion at times but no distress noted. IV intact and patent with no s/s of infection noted. Respirations even and unlabored with heart rate regular. Patient able to ambulate independently without assistance during the day but needs posey and assistance at night. Bed in low locked position with call light within reach. Patient instructed to call if assistance is needed. Will continue to monitor.

## 2017-08-12 NOTE — PROGRESS NOTES
Hospitalist Progress Note    2017  Admit Date: 2017 12:17 AM   NAME: Emilee Cuellar   :  10/18/1931   MRN:  [id number]   Attending: Franci Morgan MD  PCP:  None  Treatment Team: Attending Provider: Franci Morgan MD; Utilization Review: Kristi Choi RN; Care Manager: Naomy Masseydaisha Young; Consulting Provider: Patti Craig MD; Consulting Provider: Mora Marr MD; Hospitalist: Maria Guadalupe Acosta MD; Utilization Review: Eden Paulino RN    Full Code     SUBJECTIVE:     Seen at bedside. Stable. Empirically Started on IV vanco + zosyn yesterday due to persistently elevated WBC count. Pneumonia seems to be improving. UTI not impressive. Will continue IV antibiotics for now. Scheduled for aortic aneurysm repair next week.        Recent Results (from the past 24 hour(s))   METABOLIC PANEL, COMPREHENSIVE    Collection Time: 17  7:15 AM   Result Value Ref Range    Sodium 143 136 - 145 mmol/L    Potassium 4.1 3.5 - 5.1 mmol/L    Chloride 109 (H) 98 - 107 mmol/L    CO2 27 21 - 32 mmol/L    Anion gap 7 7 - 16 mmol/L    Glucose 118 (H) 65 - 100 mg/dL    BUN 28 (H) 8 - 23 MG/DL    Creatinine 1.54 (H) 0.8 - 1.5 MG/DL    GFR est AA 55 (L) >60 ml/min/1.73m2    GFR est non-AA 46 (L) >60 ml/min/1.73m2    Calcium 8.6 8.3 - 10.4 MG/DL    Bilirubin, total 0.3 0.2 - 1.1 MG/DL    ALT (SGPT) 25 12 - 65 U/L    AST (SGOT) 18 15 - 37 U/L    Alk.  phosphatase 82 50 - 136 U/L    Protein, total 6.7 6.3 - 8.2 g/dL    Albumin 2.7 (L) 3.2 - 4.6 g/dL    Globulin 4.0 (H) 2.3 - 3.5 g/dL    A-G Ratio 0.7 (L) 1.2 - 3.5     CBC W/O DIFF    Collection Time: 17  7:15 AM   Result Value Ref Range    WBC 14.2 (H) 4.3 - 11.1 K/uL    RBC 4.08 (L) 4.23 - 5.67 M/uL    HGB 12.6 (L) 13.6 - 17.2 g/dL    HCT 38.3 (L) 41.1 - 50.3 %    MCV 93.9 79.6 - 97.8 FL    MCH 30.9 26.1 - 32.9 PG    MCHC 32.9 31.4 - 35.0 g/dL    RDW 15.0 (H) 11.9 - 14.6 %    PLATELET 736 745 - 557 K/uL    MPV 10.2 (L) 10.8 - 14.1 FL   MAGNESIUM Collection Time: 08/12/17  7:15 AM   Result Value Ref Range    Magnesium 2.3 1.8 - 2.4 mg/dL       No Known Allergies  Current Facility-Administered Medications   Medication Dose Route Frequency Provider Last Rate Last Dose    piperacillin-tazobactam (ZOSYN) 4.5 g in 0.9% sodium chloride (MBP/ADV) 50 mL MBP  4.5 g IntraVENous Q8H Rere Jalloh MD 12.5 mL/hr at 08/12/17 0556 4.5 g at 08/12/17 0556    vancomycin (VANCOCIN) 1,000 mg in 0.9% sodium chloride (MBP/ADV) 250 mL  1,000 mg IntraVENous Q24H Rere Jalloh  mL/hr at 08/12/17 1200 1,000 mg at 08/12/17 1200    enoxaparin (LOVENOX) injection 30 mg  30 mg SubCUTAneous Q24H Rere Jalloh MD   30 mg at 08/12/17 0803    hydrALAZINE (APRESOLINE) tablet 50 mg  50 mg Oral TID Rosana Swain MD   50 mg at 08/12/17 0803    methylPREDNISolone (PF) (SOLU-MEDROL) injection 20 mg  20 mg IntraVENous Q12H Cassia Denson MD   20 mg at 08/12/17 0803    metoprolol tartrate (LOPRESSOR) tablet 25 mg  25 mg Oral Q12H Vidya Pineda MD   25 mg at 08/12/17 0803    atorvastatin (LIPITOR) tablet 40 mg  40 mg Oral QHS Vidya Pineda MD   40 mg at 08/11/17 2115    albuterol-ipratropium (DUO-NEB) 2.5 MG-0.5 MG/3 ML  3 mL Nebulization Q4H RT Leopoldo Dumas, NP   3 mL at 08/12/17 1128    budesonide (PULMICORT) 500 mcg/2 ml nebulizer suspension  500 mcg Nebulization BID RT Leopoldo Dumas, NP   500 mcg at 08/12/17 0747    sodium chloride (NS) flush 5-10 mL  5-10 mL IntraVENous Q8H Melanie Churchill MD   10 mL at 08/12/17 0555    sodium chloride (NS) flush 5-10 mL  5-10 mL IntraVENous PRN Melanie Churchill MD        acetaminophen (TYLENOL) tablet 650 mg  650 mg Oral Q4H PRN Melanie Churchill MD   650 mg at 08/04/17 0412    ondansetron (ZOFRAN) injection 4 mg  4 mg IntraVENous Q4H PRN Melanie Churchill MD        aspirin delayed-release tablet 81 mg  81 mg Oral DAILY Melanie Churchill MD   81 mg at 08/12/17 0803    baclofen (LIORESAL) tablet 10 mg  10 mg Oral BID Melanie Churchill MD   10 mg at 17 5330    docusate sodium (COLACE) capsule 100 mg  100 mg Oral BID Melanie Churchill MD   100 mg at 17 4307    ferrous sulfate tablet 325 mg  325 mg Oral ACB Melanie Churchill MD   325 mg at 17 5542    finasteride (PROSCAR) tablet 5 mg  5 mg Oral DAILY Melanie Churchill MD   5 mg at 17 3344    levothyroxine (SYNTHROID) tablet 100 mcg  100 mcg Oral ACB Melanie Churchill MD   100 mcg at 17 0556    nicotine (NICODERM CQ) 7 mg/24 hr patch 1 Patch  1 Patch TransDERmal Q24H Melanie Churchill MD   1 Patch at 08/10/17 0218    pantoprazole (PROTONIX) tablet 40 mg  40 mg Oral CLEMENTINAB Melanie Churchill MD   40 mg at 17 0556           Review of Systems negative with exception of pertinent positives noted above  PHYSICAL EXAM     Visit Vitals    /76 (BP 1 Location: Left arm, BP Patient Position: At rest)    Pulse 76    Temp 97.6 °F (36.4 °C)    Resp 18    Ht 5' 4\" (1.626 m)    Wt 67 kg (147 lb 9.6 oz)    SpO2 100%    BMI 25.34 kg/m2      Temp (24hrs), Av.8 °F (36.6 °C), Min:97.6 °F (36.4 °C), Max:98.1 °F (36.7 °C)    Oxygen Therapy  O2 Sat (%): 100 % (17 1509)  Pulse via Oximetry: 58 beats per minute (17 1131)  O2 Device: Room air (17 1131)  O2 Flow Rate (L/min): 2 l/min (17 2104)    Intake/Output Summary (Last 24 hours) at 17 1608  Last data filed at 17 1321   Gross per 24 hour   Intake              600 ml   Output              600 ml   Net                0 ml      General: No acute distress  AA Ox3   Eyes  Anicteric, conjunctiva pink, EOM full, PERRL  Lungs:  CTA, normal respiratory effort  Heart:  RRR no  Murmur, gallops or rubs  Abdomen: Soft NT, ND, NHSM  Extremities: No cyanosis, clubbing or edema  Neurologic:  CN 2- 12 intact, no sensory or motor deficit   Skin   Warm dry,  good turgour, moist mucous membrane  Psych  Insight good affect normal mood good      DIAGNOSTIC STUDIES      XR CHEST PORT (Final result) Result time: 08/10/17 18:16:19     Final result     Impression:     IMPRESSION: Interstitial prominence which may represent edema. Small patchy  density partially obscures right hemidiaphragm. There are sternal wires. ASSESSMENT      Active Hospital Problems    Diagnosis Date Noted    HCAP (healthcare-associated pneumonia) 08/11/2017    CAP (community acquired pneumonia) 08/01/2017    Altered mental status 08/01/2017    UTI (urinary tract infection) 08/01/2017    Fever 08/01/2017       Plan:  Continue IV Vanco + Zosyn for now due to persistently elevated WBC count. Pneumonia seems to be improving. UTI not impressive. Will monitor.    For surgical repair of aneurysm the next week       DVT Prophylaxis: SQ heparin

## 2017-08-12 NOTE — PROGRESS NOTES
[facility] CARDIOLOGY PROGRESS NOTE    8/12/2017 9:45 AM    Admit Date: 8/1/2017    Admit Diagnosis: Abdominal aortic aneurysm (AAA) without rupture ([facility] [address] [U51. *      Subjective:   Stable overnight without angina, CHF, or palpitations. Vitals stable and controlled. Up in chair without complaint. No other complaints overnight. Tolerating meds well. Objective:      Vitals:    08/12/17 0321 08/12/17 0415 08/12/17 0723 08/12/17 0748   BP: 122/53  137/58    Pulse: 80  83    Resp: 18  18    Temp: 97.8 °F (36.6 °C)  97.8 °F (36.6 °C)    SpO2: 95% 95% 97% 96%   Weight:       Height:           Physical Exam:  Neck- supple, no JVD  CV- regular rate and rhythm 2/6 AI and LSB murmur  Lung- clear bilaterally  Abd- soft, nontender, nondistended  Ext- no edema  Skin- warm and dry    Data Review:   Recent Labs      08/12/17   0715  08/11/17   0652  08/11/17   0625   NA  143  145   --    K  4.1  3.9   --    MG  2.3  2.2   --    BUN  28*  28*   --    CREA  1.54*  1.66*   --    GLU  118*  115*   --    WBC  14.2*   --   12.9*   HGB  12.6*   --   11.6*   HCT  38.3*   --   35.3*   PLT  425   --   378       Assessment and Plan:     Principal Problem:    CAP (community acquired pneumonia) (8/1/2017): per primary team- stable without SOB, cough, wheezing at present.      Active Problems:    Altered mental status (8/1/2017)- resolved, lucid today, follow clinically       UTI (urinary tract infection) (8/1/2017): per primary team- continue ABX per primary MD's       Fever (8/1/2017)     ARF:  Check AM labs, follow BMP and Mg, stable creatinine at present     CAD: no CP, EF normal on the echo. Follow on remote tele, follow. Continue statin, ASA and BB. At moderate overall cardiac risk for surgery, we will follow.      AI:  EF normal, AI moderate. Follow in the future. Sinus on tele. Clinically euvolemic. Avoid excessive volume administration perioperatively. Recheck BMP and Mg in AM    A.  Dora Adams MD  [facility] Cardiology  Pager [phone]

## 2017-08-13 LAB
ANION GAP BLD CALC-SCNC: 15 MMOL/L (ref 7–16)
BUN SERPL-MCNC: 26 MG/DL (ref 8–23)
CALCIUM SERPL-MCNC: 8.4 MG/DL (ref 8.3–10.4)
CHLORIDE SERPL-SCNC: 109 MMOL/L (ref 98–107)
CO2 SERPL-SCNC: 21 MMOL/L (ref 21–32)
CREAT SERPL-MCNC: 1.73 MG/DL (ref 0.8–1.5)
GLUCOSE SERPL-MCNC: 109 MG/DL (ref 65–100)
MAGNESIUM SERPL-MCNC: 2.4 MG/DL (ref 1.8–2.4)
POTASSIUM SERPL-SCNC: 3.9 MMOL/L (ref 3.5–5.1)
SODIUM SERPL-SCNC: 145 MMOL/L (ref 136–145)

## 2017-08-13 PROCEDURE — 94640 AIRWAY INHALATION TREATMENT: CPT

## 2017-08-13 PROCEDURE — 94760 N-INVAS EAR/PLS OXIMETRY 1: CPT

## 2017-08-13 PROCEDURE — 74011000250 HC RX REV CODE- 250: Performed by: NURSE PRACTITIONER

## 2017-08-13 PROCEDURE — 83735 ASSAY OF MAGNESIUM: CPT | Performed by: INTERNAL MEDICINE

## 2017-08-13 PROCEDURE — 74011250637 HC RX REV CODE- 250/637: Performed by: INTERNAL MEDICINE

## 2017-08-13 PROCEDURE — 74011000258 HC RX REV CODE- 258: Performed by: INTERNAL MEDICINE

## 2017-08-13 PROCEDURE — 65660000000 HC RM CCU STEPDOWN

## 2017-08-13 PROCEDURE — 80048 BASIC METABOLIC PNL TOTAL CA: CPT | Performed by: INTERNAL MEDICINE

## 2017-08-13 PROCEDURE — 74011250636 HC RX REV CODE- 250/636: Performed by: INTERNAL MEDICINE

## 2017-08-13 PROCEDURE — 36415 COLL VENOUS BLD VENIPUNCTURE: CPT | Performed by: INTERNAL MEDICINE

## 2017-08-13 PROCEDURE — 65270000029 HC RM PRIVATE

## 2017-08-13 PROCEDURE — 74011250637 HC RX REV CODE- 250/637: Performed by: HOSPITALIST

## 2017-08-13 RX ADMIN — METHYLPREDNISOLONE SODIUM SUCCINATE 20 MG: 40 INJECTION, POWDER, FOR SOLUTION INTRAMUSCULAR; INTRAVENOUS at 08:31

## 2017-08-13 RX ADMIN — FINASTERIDE 5 MG: 5 TABLET, FILM COATED ORAL at 08:30

## 2017-08-13 RX ADMIN — HYDRALAZINE HYDROCHLORIDE 50 MG: 50 TABLET ORAL at 22:26

## 2017-08-13 RX ADMIN — Medication 10 ML: at 06:10

## 2017-08-13 RX ADMIN — ASPIRIN 81 MG: 81 TABLET, COATED ORAL at 08:30

## 2017-08-13 RX ADMIN — HYDRALAZINE HYDROCHLORIDE 50 MG: 50 TABLET ORAL at 08:30

## 2017-08-13 RX ADMIN — PANTOPRAZOLE SODIUM 40 MG: 40 TABLET, DELAYED RELEASE ORAL at 06:10

## 2017-08-13 RX ADMIN — Medication 5 ML: at 14:00

## 2017-08-13 RX ADMIN — FERROUS SULFATE TAB 325 MG (65 MG ELEMENTAL FE) 325 MG: 325 (65 FE) TAB at 06:10

## 2017-08-13 RX ADMIN — METOPROLOL TARTRATE 25 MG: 25 TABLET ORAL at 22:26

## 2017-08-13 RX ADMIN — IPRATROPIUM BROMIDE AND ALBUTEROL SULFATE 3 ML: 2.5; .5 SOLUTION RESPIRATORY (INHALATION) at 16:30

## 2017-08-13 RX ADMIN — IPRATROPIUM BROMIDE AND ALBUTEROL SULFATE 3 ML: 2.5; .5 SOLUTION RESPIRATORY (INHALATION) at 11:46

## 2017-08-13 RX ADMIN — METOPROLOL TARTRATE 25 MG: 25 TABLET ORAL at 08:30

## 2017-08-13 RX ADMIN — PIPERACILLIN SODIUM,TAZOBACTAM SODIUM 4.5 G: 4; .5 INJECTION, POWDER, FOR SOLUTION INTRAVENOUS at 15:20

## 2017-08-13 RX ADMIN — METHYLPREDNISOLONE SODIUM SUCCINATE 20 MG: 40 INJECTION, POWDER, FOR SOLUTION INTRAMUSCULAR; INTRAVENOUS at 22:26

## 2017-08-13 RX ADMIN — PIPERACILLIN SODIUM,TAZOBACTAM SODIUM 4.5 G: 4; .5 INJECTION, POWDER, FOR SOLUTION INTRAVENOUS at 06:10

## 2017-08-13 RX ADMIN — IPRATROPIUM BROMIDE AND ALBUTEROL SULFATE 3 ML: 2.5; .5 SOLUTION RESPIRATORY (INHALATION) at 21:00

## 2017-08-13 RX ADMIN — Medication 10 ML: at 22:27

## 2017-08-13 RX ADMIN — BUDESONIDE 500 MCG: 0.5 SUSPENSION RESPIRATORY (INHALATION) at 21:00

## 2017-08-13 RX ADMIN — IPRATROPIUM BROMIDE AND ALBUTEROL SULFATE 3 ML: 2.5; .5 SOLUTION RESPIRATORY (INHALATION) at 04:04

## 2017-08-13 RX ADMIN — ATORVASTATIN CALCIUM 40 MG: 40 TABLET, FILM COATED ORAL at 22:27

## 2017-08-13 RX ADMIN — VANCOMYCIN HYDROCHLORIDE 1000 MG: 1 INJECTION, POWDER, LYOPHILIZED, FOR SOLUTION INTRAVENOUS at 11:20

## 2017-08-13 RX ADMIN — BACLOFEN 10 MG: 10 TABLET ORAL at 16:02

## 2017-08-13 RX ADMIN — BACLOFEN 10 MG: 10 TABLET ORAL at 08:30

## 2017-08-13 RX ADMIN — ENOXAPARIN SODIUM 30 MG: 30 INJECTION SUBCUTANEOUS at 08:31

## 2017-08-13 RX ADMIN — DOCUSATE SODIUM 100 MG: 100 CAPSULE, LIQUID FILLED ORAL at 08:30

## 2017-08-13 RX ADMIN — PIPERACILLIN SODIUM,TAZOBACTAM SODIUM 4.5 G: 4; .5 INJECTION, POWDER, FOR SOLUTION INTRAVENOUS at 22:31

## 2017-08-13 RX ADMIN — IPRATROPIUM BROMIDE AND ALBUTEROL SULFATE 3 ML: 2.5; .5 SOLUTION RESPIRATORY (INHALATION) at 07:40

## 2017-08-13 RX ADMIN — BUDESONIDE 500 MCG: 0.5 SUSPENSION RESPIRATORY (INHALATION) at 07:40

## 2017-08-13 RX ADMIN — LEVOTHYROXINE SODIUM 100 MCG: 100 TABLET ORAL at 06:10

## 2017-08-13 RX ADMIN — IPRATROPIUM BROMIDE AND ALBUTEROL SULFATE 3 ML: 2.5; .5 SOLUTION RESPIRATORY (INHALATION) at 00:08

## 2017-08-13 RX ADMIN — DOCUSATE SODIUM 100 MG: 100 CAPSULE, LIQUID FILLED ORAL at 16:02

## 2017-08-13 RX ADMIN — HYDRALAZINE HYDROCHLORIDE 50 MG: 50 TABLET ORAL at 16:02

## 2017-08-13 NOTE — PROGRESS NOTES
Physical assessment completed, pt alert cooperative with care, denies pain or discomfort, resting in bed on RA, call light within reach.

## 2017-08-13 NOTE — PROGRESS NOTES
Hospitalist Progress Note    Subjective:   Daily Progress Note: 8/13/2017 5:42 PM    History:  Patient with history of hypertension, CKD, dementia,  Hypothyroidism sent in transfer from [facility] 8/1/17 for direct admission for UTI, fever, worsening renal failure, hypoxemia and URI/pneumonia. Poor historian, lives with family who are not present on admission. S workup included creatinine of 1.8 up from baseline of 1.3. WBC:  13.1, lactic acid 1.5, BNP: 102, temp 100.7, oxygen sat 90% on room air with PaO2 of 69. CXR reported as normal.  Administered rocephin, zithromax and fluids there. Only complaints were malaise, weakness, cough and congestion. He was admitted on rocephin and zithromax, hold ACE and HCTZ, bedrest, IVF, nebs. Seen by OT, PT, SW.  No cultures done at [facility], however old records indicate enterococcus UTI at [facility] on 7/31/  Requiring 3 liters of oxygen to keep oxygen sat in the 90's. No PCP. History of CAD with CABG. Continued fever on 8/3. Seen by SLP.     8/5:  Ultrasound of retroperitoneum ordered to evaluate possible mechanical obstruction resulting in acute renal insufficiency. Low dose beta blocker and statin added. Accidentally found a 4.1 cm AAA, follow up CTA as below. Also found a high grade left renal artery stenosis. No prior history. Remote CABG. Vascular surgery consulted with pending surgical repair. 8/6:  Cardiology consult for surgical clear. On statin, ASA, beta blocker. EF normal on echo. Only relative patient has is stepson with matt relationship. Arrangements made with care management and risk management. ID consult for enterococcus UTI with recommendations. Rocephin changed to IV ampicillin until 8/16 prior to surgery. EVAR planned for 8/16. Also recommend operative prophylaxis at the time of surgery by adding vancomycin to routine cefazolin. 8/12:  Antibiotics changed to vancomycin and zosyn due to persistently elevated WBC.       Today:  Up in chair without complaints, waiting for surgery 8/16    Current Facility-Administered Medications   Medication Dose Route Frequency    [START ON 8/14/2017] Vancomycin trough reminder   Other ONCE    piperacillin-tazobactam (ZOSYN) 4.5 g in 0.9% sodium chloride (MBP/ADV) 50 mL MBP  4.5 g IntraVENous Q8H    vancomycin (VANCOCIN) 1,000 mg in 0.9% sodium chloride (MBP/ADV) 250 mL  1,000 mg IntraVENous Q24H    enoxaparin (LOVENOX) injection 30 mg  30 mg SubCUTAneous Q24H    hydrALAZINE (APRESOLINE) tablet 50 mg  50 mg Oral TID    methylPREDNISolone (PF) (SOLU-MEDROL) injection 20 mg  20 mg IntraVENous Q12H    metoprolol tartrate (LOPRESSOR) tablet 25 mg  25 mg Oral Q12H    atorvastatin (LIPITOR) tablet 40 mg  40 mg Oral QHS    albuterol-ipratropium (DUO-NEB) 2.5 MG-0.5 MG/3 ML  3 mL Nebulization Q4H RT    budesonide (PULMICORT) 500 mcg/2 ml nebulizer suspension  500 mcg Nebulization BID RT    sodium chloride (NS) flush 5-10 mL  5-10 mL IntraVENous Q8H    sodium chloride (NS) flush 5-10 mL  5-10 mL IntraVENous PRN    acetaminophen (TYLENOL) tablet 650 mg  650 mg Oral Q4H PRN    ondansetron (ZOFRAN) injection 4 mg  4 mg IntraVENous Q4H PRN    aspirin delayed-release tablet 81 mg  81 mg Oral DAILY    baclofen (LIORESAL) tablet 10 mg  10 mg Oral BID    docusate sodium (COLACE) capsule 100 mg  100 mg Oral BID    ferrous sulfate tablet 325 mg  325 mg Oral ACB    finasteride (PROSCAR) tablet 5 mg  5 mg Oral DAILY    levothyroxine (SYNTHROID) tablet 100 mcg  100 mcg Oral ACB    nicotine (NICODERM CQ) 7 mg/24 hr patch 1 Patch  1 Patch TransDERmal Q24H    pantoprazole (PROTONIX) tablet 40 mg  40 mg Oral ACB        Review of Systems  A comprehensive review of systems was negative except for that written in the HPI.     Objective:     Visit Vitals    /60 (BP 1 Location: Left arm, BP Patient Position: Sitting)    Pulse 88    Temp 98.1 °F (36.7 °C)    Resp 18    Ht 5' 4\" (1.626 m)    Wt 67 kg (147 lb 9.6 oz)  SpO2 97%    BMI 25.34 kg/m2    O2 Flow Rate (L/min): 2 l/min O2 Device: Room air    Temp (24hrs), Av.9 °F (36.6 °C), Min:97.6 °F (36.4 °C), Max:98.1 °F (36.7 °C)      701 - 1900  In: 360 [P.O.:360]  Out: 200 [Urine:200]  1901 -  0700  In: 960 [P.O.:960]  Out: 909 [QEXBW:952]    General appearance: Oriented and alert, cooperative, denies pain. Head: Normocephalic, without obvious abnormality, atraumatic  Eyes: conjunctivae/corneas clear. PERRL  Throat: Lips, mucosa, and tongue normal. Teeth and gums normal  Neck: supple, symmetrical, trachea midline, no adenopathy, no JVD  Lungs: clear to auscultation bilaterally  Heart: regular rate and rhythm, S1, S2 normal, no murmur, click, rub or gallop  Abdomen: soft, non-tender. Bowel sounds normal. No masses,  no organomegaly  Extremities: extremities normal, atraumatic, no cyanosis or edema  Skin: Skin color, texture, turgor normal. No rashes or lesions  Neurologic: Grossly normal    Additional comments:  Notes, orders, test results, vitals reviewed. Data Review    Recent Results (from the past 24 hour(s))   METABOLIC PANEL, BASIC    Collection Time: 17  6:54 AM   Result Value Ref Range    Sodium 145 136 - 145 mmol/L    Potassium 3.9 3.5 - 5.1 mmol/L    Chloride 109 (H) 98 - 107 mmol/L    CO2 21 21 - 32 mmol/L    Anion gap 15 7 - 16 mmol/L    Glucose 109 (H) 65 - 100 mg/dL    BUN 26 (H) 8 - 23 MG/DL    Creatinine 1.73 (H) 0.8 - 1.5 MG/DL    GFR est AA 49 (L) >60 ml/min/1.73m2    GFR est non-AA 40 (L) >60 ml/min/1.73m2    Calcium 8.4 8.3 - 10.4 MG/DL   MAGNESIUM    Collection Time: 17  6:54 AM   Result Value Ref Range    Magnesium 2.4 1.8 - 2.4 mg/dL     8/3:  ULTRASOUND RETROPERITONEUM:  A 4.1 cm abdominal aortic aneurysm with peripheral thrombus. Negative for hydronephrosis. Abnormally increased renal echogenicity. Simple renal cysts.     :  CXR:  Stable post surgical changes overlie the mediastinal silhouette.  A left shoulder replacement is once again seen. Stable mild cardiomegaly is seen. Lungs are expanded without pneumothorax. No evolving consolidation, or pleural effusion is seen. Only stable likely chronic interstitial prominence is seen most evident at the lung bases. IMPRESSION:   Stable cardiomegaly without evolving acute changes seen. 8/4:  CTA ABDOMEN WITH RUNOFF:  The abdominal aorta is patent. Below the takeoff of the renal arteries there is enlargement of the abdominal aorta with a somewhat saccular component to the right of the main flow lumen. The aneurysm measures 4.0 x 6.7 cm the aorta is of relatively normal size and contour at the level of the iliac arteries. The SMA and celiac axis are widely patent. There is a moderate stenosis of the proximal right renal artery. There is a high-grade stenosis of the left renal artery. It is patent but is high-grade. The iliac arteries are patent. There is a moderate stenosis of the distal common iliac artery. The right lower extremity shows bulky plaque at the common femoral artery. There is a moderate stenosis at this level. The SFA shows diffuse disc disease with areas of moderate and high-grade stenoses. The popliteal artery is patent. There is relatively good 2 vessel runoff to the level of the ankle via the peroneal  and anterior tibial arteries. The posterior tibial artery appears largely occluded. Michelle graph the left lower extremity mild stenosis of the common femoral artery. The SFA is occluded proximally. The profunda femoris artery is patent. The popliteal artery reconstitutes. There is relatively good 2 vessel runoff to the level of the ankle via the peroneal artery and anterior tibial artery. Upon review of the source images the lungs show chronic appearing changes in the bases. In addition there are bilateral pleural effusions right being larger than the left. The liver shows no gross masses.  The gallbladder is relatively small with a single appearing radiopaque stone measuring approximately 9 mm in  diameter. The spleen and pancreas show no abnormal masses. The adrenal glands and kidneys show no abnormal masses. There are simple cyst of the left kidney measuring 2.5 cm in diameter. The bowel is unopacified as per CTA protocol. The bones are generally osteopenic. The spine shows mild degenerative change. IMPRESSION:  Infrarenal abdominal aortic aneurysm with saccular component measuring 4.0, x 6.7 cm. 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 High-grade left renal artery stenosis. Right lower extremity: Moderate stenosis of the common femoral artery and  diffuse high-grade disease of SFA. Left lower extremity: Occlusion of SFA reconstitution at the popliteal artery level. Bilateral pleural effusions. Cholelithiasis. recommend vascular surgical consultation.     8/10:  CXR:  Interstitial prominence which may represent edema. Small patchy density partially obscures right hemidiaphragm. There are sternal wires.      ECHOCARDIOGRAM:  -  Left ventricle: Systolic function was normal. Ejection fraction was estimated in the range of 60 % to 65 %. There were no regional wall motion abnormalities. There was mild concentric hypertrophy. Left ventricular diastolic function parameters suggest diastolic dysfunction. -  Right ventricle: There was mild pulmonary artery hypertension. -  Left atrium: The atrium was moderately to markedly dilated. -  Right atrium: The atrium was moderately dilated. -  Aortic valve: The valve was trileaflet. Leaflets exhibited mild to   Moderate calcification and reduced mobility. There was moderate regurgitation. The aortic valve area by the continuity equation was 2.05 cm2. The mean pressure gradient was 18 mmHg. The findings were most consistent with mild aortic stenosis. -  Mitral valve: There was mild annular calcification. There was mild to moderate regurgitation. -  Tricuspid valve: There was mild regurgitation.     Assessment/Plan: CAP  AAA, 6.1 cm  Altered mental status with acute metabolic [id number](4/9/2832)  Hypothyroidism  Acute UTI   Fever   HCAP   Hypertension, poorly controlled  Persistent leukocytosis    PLAN:    AM labs  Continue current plan of care    Care Plan discussed with:   RN, Patient    Signed By: Jeff Kerr NP     August 13, 2017

## 2017-08-13 NOTE — PROGRESS NOTES
Patient stable with no complaints at this time. Patient sitting in recliner relaxing with no visual pain or discomfort at this time. Call light within reach and patient instructed to call if assistance is needed.   Report to be given to oncoming RN 7p-7a

## 2017-08-13 NOTE — PROGRESS NOTES
[facility] CARDIOLOGY PROGRESS NOTE    8/13/2017 8:05 AM    Admit Date: 8/1/2017    Admit Diagnosis: Abdominal aortic aneurysm (AAA) without rupture ([facility] [address] [J97. *      Subjective:   Stable overnight without angina, CHF, or palpitations. Vitals stable and controlled. No other complaints overnight. Tolerating meds well. Objective:      Vitals:    08/13/17 0008 08/13/17 0405 08/13/17 0412 08/13/17 0715   BP:   146/54 118/55   Pulse:   77 87   Resp:   16 18   Temp:   98.1 °F (36.7 °C) 98 °F (36.7 °C)   SpO2: 98% 98% 98% 98%   Weight:       Height:           Physical Exam:  Neck- supple, no JVD  CV- regular rate and rhythm no MRG  Lung- clear bilaterally  Abd- soft, nontender, nondistended  Ext- no edema  Skin- warm and dry    Data Review:   Recent Labs      08/12/17   0715  08/11/17   0652  08/11/17   0625   NA  143  145   --    K  4.1  3.9   --    MG  2.3  2.2   --    BUN  28*  28*   --    CREA  1.54*  1.66*   --    GLU  118*  115*   --    WBC  14.2*   --   12.9*   HGB  12.6*   --   11.6*   HCT  38.3*   --   35.3*   PLT  425   --   378       Assessment and Plan:       Principal Problem:    CAP (community acquired pneumonia) (8/1/2017): per primary team- stable without SOB, cough, wheezing at present.       Active Problems:    Altered mental status (8/1/2017)- resolved, lucid today, follow clinically        UTI (urinary tract infection) (8/1/2017): per primary team- continue ABX per primary MD's        Fever (8/1/2017)- resolved, stable      ARF:  Check AM labs, follow BMP and Mg, stable creatinine at present      CAD: no CP, EF normal on the echo.  Follow on remote tele, follow. Continue statin, ASA and BB.  At moderate overall cardiac risk for surgery, we will follow.       AI:  EF normal, AI moderate.  Follow in the future.   Sinus on tele. Clinically euvolemic. Avoid excessive volume administration perioperatively.      Recheck BMP and Mg in AM    A.  Luis Ward MD  [facility] Cardiology  Pager [phone]

## 2017-08-14 LAB
ALBUMIN SERPL BCP-MCNC: 2.6 G/DL (ref 3.2–4.6)
ALBUMIN/GLOB SERPL: 0.7 {RATIO} (ref 1.2–3.5)
ALP SERPL-CCNC: 71 U/L (ref 50–136)
ALT SERPL-CCNC: 23 U/L (ref 12–65)
ANION GAP BLD CALC-SCNC: 7 MMOL/L (ref 7–16)
AST SERPL W P-5'-P-CCNC: 14 U/L (ref 15–37)
BASOPHILS # BLD AUTO: 0 K/UL (ref 0–0.2)
BASOPHILS # BLD: 0 % (ref 0–2)
BILIRUB SERPL-MCNC: 0.2 MG/DL (ref 0.2–1.1)
BUN SERPL-MCNC: 26 MG/DL (ref 8–23)
CALCIUM SERPL-MCNC: 8.4 MG/DL (ref 8.3–10.4)
CHLORIDE SERPL-SCNC: 110 MMOL/L (ref 98–107)
CO2 SERPL-SCNC: 26 MMOL/L (ref 21–32)
CREAT SERPL-MCNC: 1.66 MG/DL (ref 0.8–1.5)
CRP SERPL-MCNC: 0.4 MG/DL (ref 0–0.9)
DIFFERENTIAL METHOD BLD: ABNORMAL
EOSINOPHIL # BLD: 0 K/UL (ref 0–0.8)
EOSINOPHIL NFR BLD: 0 % (ref 0.5–7.8)
ERYTHROCYTE [DISTWIDTH] IN BLOOD BY AUTOMATED COUNT: 15.4 % (ref 11.9–14.6)
ERYTHROCYTE [SEDIMENTATION RATE] IN BLOOD: 15 MM/HR (ref 0–20)
GLOBULIN SER CALC-MCNC: 3.6 G/DL (ref 2.3–3.5)
GLUCOSE SERPL-MCNC: 124 MG/DL (ref 65–100)
HCT VFR BLD AUTO: 35.5 % (ref 41.1–50.3)
HGB BLD-MCNC: 11.9 G/DL (ref 13.6–17.2)
IMM GRANULOCYTES # BLD: 0.1 K/UL (ref 0–0.5)
IMM GRANULOCYTES NFR BLD AUTO: 0.4 % (ref 0–5)
LYMPHOCYTES # BLD AUTO: 17 % (ref 13–44)
LYMPHOCYTES # BLD: 1.9 K/UL (ref 0.5–4.6)
MAGNESIUM SERPL-MCNC: 2.4 MG/DL (ref 1.8–2.4)
MAGNESIUM SERPL-MCNC: 2.5 MG/DL (ref 1.8–2.4)
MCH RBC QN AUTO: 31.2 PG (ref 26.1–32.9)
MCHC RBC AUTO-ENTMCNC: 33.5 G/DL (ref 31.4–35)
MCV RBC AUTO: 92.9 FL (ref 79.6–97.8)
MONOCYTES # BLD: 0.6 K/UL (ref 0.1–1.3)
MONOCYTES NFR BLD AUTO: 6 % (ref 4–12)
NEUTS SEG # BLD: 8.6 K/UL (ref 1.7–8.2)
NEUTS SEG NFR BLD AUTO: 77 % (ref 43–78)
PLATELET # BLD AUTO: 377 K/UL (ref 150–450)
PMV BLD AUTO: 10.3 FL (ref 10.8–14.1)
POTASSIUM SERPL-SCNC: 3.8 MMOL/L (ref 3.5–5.1)
PROT SERPL-MCNC: 6.2 G/DL (ref 6.3–8.2)
RBC # BLD AUTO: 3.82 M/UL (ref 4.23–5.67)
SODIUM SERPL-SCNC: 143 MMOL/L (ref 136–145)
VANCOMYCIN TROUGH SERPL-MCNC: 12.7 UG/ML (ref 5–20)
WBC # BLD AUTO: 11.2 K/UL (ref 4.3–11.1)

## 2017-08-14 PROCEDURE — 74011000258 HC RX REV CODE- 258: Performed by: INTERNAL MEDICINE

## 2017-08-14 PROCEDURE — 94760 N-INVAS EAR/PLS OXIMETRY 1: CPT

## 2017-08-14 PROCEDURE — 65270000029 HC RM PRIVATE

## 2017-08-14 PROCEDURE — 74011250636 HC RX REV CODE- 250/636: Performed by: INTERNAL MEDICINE

## 2017-08-14 PROCEDURE — 83735 ASSAY OF MAGNESIUM: CPT | Performed by: NURSE PRACTITIONER

## 2017-08-14 PROCEDURE — 86140 C-REACTIVE PROTEIN: CPT | Performed by: NURSE PRACTITIONER

## 2017-08-14 PROCEDURE — 80202 ASSAY OF VANCOMYCIN: CPT | Performed by: INTERNAL MEDICINE

## 2017-08-14 PROCEDURE — 97530 THERAPEUTIC ACTIVITIES: CPT

## 2017-08-14 PROCEDURE — 74011000250 HC RX REV CODE- 250: Performed by: NURSE PRACTITIONER

## 2017-08-14 PROCEDURE — 74011250637 HC RX REV CODE- 250/637: Performed by: INTERNAL MEDICINE

## 2017-08-14 PROCEDURE — 80053 COMPREHEN METABOLIC PANEL: CPT | Performed by: NURSE PRACTITIONER

## 2017-08-14 PROCEDURE — 94640 AIRWAY INHALATION TREATMENT: CPT

## 2017-08-14 PROCEDURE — 85652 RBC SED RATE AUTOMATED: CPT | Performed by: NURSE PRACTITIONER

## 2017-08-14 PROCEDURE — 85025 COMPLETE CBC W/AUTO DIFF WBC: CPT | Performed by: NURSE PRACTITIONER

## 2017-08-14 PROCEDURE — 77030019605

## 2017-08-14 PROCEDURE — 83735 ASSAY OF MAGNESIUM: CPT | Performed by: INTERNAL MEDICINE

## 2017-08-14 PROCEDURE — 65660000000 HC RM CCU STEPDOWN

## 2017-08-14 PROCEDURE — 74011250637 HC RX REV CODE- 250/637: Performed by: HOSPITALIST

## 2017-08-14 PROCEDURE — 36415 COLL VENOUS BLD VENIPUNCTURE: CPT | Performed by: INTERNAL MEDICINE

## 2017-08-14 RX ADMIN — Medication 5 ML: at 22:02

## 2017-08-14 RX ADMIN — LEVOTHYROXINE SODIUM 100 MCG: 100 TABLET ORAL at 05:55

## 2017-08-14 RX ADMIN — PIPERACILLIN SODIUM,TAZOBACTAM SODIUM 4.5 G: 4; .5 INJECTION, POWDER, FOR SOLUTION INTRAVENOUS at 05:55

## 2017-08-14 RX ADMIN — ENOXAPARIN SODIUM 30 MG: 30 INJECTION SUBCUTANEOUS at 08:16

## 2017-08-14 RX ADMIN — PANTOPRAZOLE SODIUM 40 MG: 40 TABLET, DELAYED RELEASE ORAL at 05:55

## 2017-08-14 RX ADMIN — IPRATROPIUM BROMIDE AND ALBUTEROL SULFATE 3 ML: 2.5; .5 SOLUTION RESPIRATORY (INHALATION) at 12:31

## 2017-08-14 RX ADMIN — FERROUS SULFATE TAB 325 MG (65 MG ELEMENTAL FE) 325 MG: 325 (65 FE) TAB at 05:55

## 2017-08-14 RX ADMIN — BACLOFEN 10 MG: 10 TABLET ORAL at 08:16

## 2017-08-14 RX ADMIN — IPRATROPIUM BROMIDE AND ALBUTEROL SULFATE 3 ML: 2.5; .5 SOLUTION RESPIRATORY (INHALATION) at 00:28

## 2017-08-14 RX ADMIN — METOPROLOL TARTRATE 25 MG: 25 TABLET ORAL at 22:01

## 2017-08-14 RX ADMIN — HYDRALAZINE HYDROCHLORIDE 50 MG: 50 TABLET ORAL at 16:07

## 2017-08-14 RX ADMIN — DOCUSATE SODIUM 100 MG: 100 CAPSULE, LIQUID FILLED ORAL at 08:16

## 2017-08-14 RX ADMIN — FINASTERIDE 5 MG: 5 TABLET, FILM COATED ORAL at 08:16

## 2017-08-14 RX ADMIN — HYDRALAZINE HYDROCHLORIDE 50 MG: 50 TABLET ORAL at 08:15

## 2017-08-14 RX ADMIN — IPRATROPIUM BROMIDE AND ALBUTEROL SULFATE 3 ML: 2.5; .5 SOLUTION RESPIRATORY (INHALATION) at 16:25

## 2017-08-14 RX ADMIN — METOPROLOL TARTRATE 25 MG: 25 TABLET ORAL at 08:16

## 2017-08-14 RX ADMIN — IPRATROPIUM BROMIDE AND ALBUTEROL SULFATE 3 ML: 2.5; .5 SOLUTION RESPIRATORY (INHALATION) at 08:40

## 2017-08-14 RX ADMIN — DOCUSATE SODIUM 100 MG: 100 CAPSULE, LIQUID FILLED ORAL at 16:07

## 2017-08-14 RX ADMIN — ATORVASTATIN CALCIUM 40 MG: 40 TABLET, FILM COATED ORAL at 22:01

## 2017-08-14 RX ADMIN — VANCOMYCIN HYDROCHLORIDE 1000 MG: 1 INJECTION, POWDER, LYOPHILIZED, FOR SOLUTION INTRAVENOUS at 11:51

## 2017-08-14 RX ADMIN — IPRATROPIUM BROMIDE AND ALBUTEROL SULFATE 3 ML: 2.5; .5 SOLUTION RESPIRATORY (INHALATION) at 21:31

## 2017-08-14 RX ADMIN — BACLOFEN 10 MG: 10 TABLET ORAL at 16:07

## 2017-08-14 RX ADMIN — Medication 5 ML: at 14:00

## 2017-08-14 RX ADMIN — ASPIRIN 81 MG: 81 TABLET, COATED ORAL at 08:16

## 2017-08-14 RX ADMIN — PIPERACILLIN SODIUM,TAZOBACTAM SODIUM 4.5 G: 4; .5 INJECTION, POWDER, FOR SOLUTION INTRAVENOUS at 15:00

## 2017-08-14 RX ADMIN — BUDESONIDE 500 MCG: 0.5 SUSPENSION RESPIRATORY (INHALATION) at 08:40

## 2017-08-14 RX ADMIN — BUDESONIDE 500 MCG: 0.5 SUSPENSION RESPIRATORY (INHALATION) at 21:31

## 2017-08-14 RX ADMIN — IPRATROPIUM BROMIDE AND ALBUTEROL SULFATE 3 ML: 2.5; .5 SOLUTION RESPIRATORY (INHALATION) at 04:57

## 2017-08-14 RX ADMIN — HYDRALAZINE HYDROCHLORIDE 50 MG: 50 TABLET ORAL at 22:01

## 2017-08-14 RX ADMIN — METHYLPREDNISOLONE SODIUM SUCCINATE 20 MG: 40 INJECTION, POWDER, FOR SOLUTION INTRAMUSCULAR; INTRAVENOUS at 08:15

## 2017-08-14 RX ADMIN — Medication 5 ML: at 05:55

## 2017-08-14 NOTE — PROGRESS NOTES
Pt resting in bed, alert, no visual s/s of pain or distress, breathing even and unlabored, call light t wihin reach, will keep monitoring.

## 2017-08-14 NOTE — PROGRESS NOTES
Pharmacokinetic Consult to Pharmacist    Princess Arnett is a 80 y.o. male being broadened to vancomycin and pip/tazo for HCAP. Height: 5' 4\" (162.6 cm)  Weight: 67 kg (147 lb 9.6 oz)  Lab Results   Component Value Date/Time    BUN 26 08/14/2017 11:05 AM    Creatinine 1.66 08/14/2017 11:05 AM    WBC 11.2 08/14/2017 11:05 AM      Estimated Creatinine Clearance: 27.2 mL/min (based on Cr of 1.66). CULTURES:  All Micro Results     Procedure Component Value Units Date/Time    CULTURE, URINE [[id number]]     Order Status:  Sent Specimen:  Urine from Clean catch     CULTURE, BLOOD [[id number]] Collected:  08/06/17 1310    Order Status:  Completed Specimen:  Blood from Blood Updated:  08/11/17 1150     Special Requests: LEFT ANTECUBITAL        Culture result: NO GROWTH 5 DAYS       CULTURE, BLOOD [[id number]] Collected:  08/04/17 1231    Order Status:  Completed Specimen:  Blood from Blood Updated:  08/09/17 0726     Special Requests: RIGHT HAND        Culture result: NO GROWTH 5 DAYS       CULTURE, BLOOD [[id number]] Collected:  08/04/17 1224    Order Status:  Completed Specimen:  Blood from Blood Updated:  08/09/17 0726     Special Requests: LEFT WRIST        Culture result: NO GROWTH 5 DAYS       CULTURE, URINE [[id number]]  (Abnormal)  (Susceptibility) Collected:  08/04/17 1710    Order Status:  Completed Specimen:  Urine from Cath Urine Updated:  08/08/17 0739     Special Requests: NO SPECIAL REQUESTS        Culture result:         3000 COLONIES/mL ENTEROCOCCUS FAECALIS GROUP D (A)        Lab Results   Component Value Date/Time    Vancomycin,trough 12.7 08/14/2017 11:05 AM       Day 3 of vancomycin. Goal trough is 15-20. Trough prior to 3rd dose is appropriately a little low. Expect steady state level to be within therapeutic range. Continue vancomycin 1000 mg IV q24h. Further levels will be ordered as clinically indicated. Pharmacy will continue to follow. Please call with any questions.     Thank cristo,  Rosendo Heimlich, PharmD  Clinical Pharmacist  [phone]

## 2017-08-14 NOTE — PROGRESS NOTES
[facility] CARDIOLOGY PROGRESS NOTE    8/14/2017 7:55 AM    Admit Date: 8/1/2017    Admit Diagnosis: Abdominal aortic aneurysm (AAA) without rupture ([facility] [address] [H49. *      Subjective:   Stable overnight without angina, CHF, or palpitations. Vitals stable and controlled. No other complaints overnight. Tolerating meds well. Objective:      Vitals:    08/13/17 2334 08/14/17 0028 08/14/17 0340 08/14/17 0457   BP: 104/60  112/63    Pulse: 67  77    Resp: 16  16    Temp: 97.8 °F (36.6 °C)  98.2 °F (36.8 °C)    SpO2: 96% 95% 95% 96%   Weight:       Height:           Physical Exam:  Neck- supple, no JVD  CV- regular rate and rhythm no MRG  Lung- clear bilaterally, mildly decreased bibasilar but no wheeze or rhonchi  Abd- soft, nontender, nondistended  Ext- no edema  Skin- warm and dry    Data Review:   Recent Labs      08/13/17   0654  08/12/17   0715   NA  145  143   K  3.9  4.1   MG  2.4  2.3   BUN  26*  28*   CREA  1.73*  1.54*   GLU  109*  118*   WBC   --   14.2*   HGB   --   12.6*   HCT   --   38.3*   PLT   --   425       Assessment and Plan:     Principal Problem:    CAP (community acquired pneumonia) (8/1/2017): per primary team- stable without SOB, cough, wheezing at present.       Active Problems:    Altered mental status (8/1/2017)- resolved, lucid today, follow clinically        UTI (urinary tract infection) (8/1/2017): per primary team- continue ABX per primary MD's        Fever (8/1/2017)- resolved, stable      ARF:  Check AM labs, follow BMP and Mg, stable creatinine at present      CAD: no CP, EF normal on the echo.  Follow on remote tele, follow. Continue statin, ASA and BB.  At moderate overall cardiac risk for surgery, we will follow.       AI:  EF normal, AI moderate.  Follow in the future.   Sinus on tele.  Clinically euvolemic. Avoid excessive volume administration perioperatively.       Recheck BMP and Mg in AM    A.  Cesar Velazquez MD  [address] Cardiology  Pager [phone]

## 2017-08-14 NOTE — PROGRESS NOTES
Patient voices no concerns at this time. Patient relaxing in recliner at this time. Call light within reach and patient instructed to call if assistance is needed. Will continue to monitor.

## 2017-08-14 NOTE — PROGRESS NOTES
Hospitalist Progress Note    Subjective:   Daily Progress Note: 8/14/2017 11:23 AM    History:  History:  Patient with history of hypertension, CKD, dementia,  Hypothyroidism sent in transfer from [facility] 8/1/17 for direct admission for UTI, fever, worsening renal failure, hypoxemia and URI/pneumonia. Poor historian, lives with family who are not present on admission. [facility] workup included creatinine of 1.8 up from baseline of 1.3. WBC:  13.1, lactic acid 1.5, BNP: 102, temp 100.7, oxygen sat 90% on room air with PaO2 of 69. CXR reported as normal.  Administered rocephin, zithromax and fluids there. Only complaints were malaise, weakness, cough and congestion. He was admitted on rocephin and zithromax, hold ACE and HCTZ, bedrest, IVF, nebs. Seen by OT, PT, SW.  No cultures done at [facility], however old records indicate enterococcus UTI at [facility] on 7/31/  Requiring 3 liters of oxygen to keep oxygen sat in the 90's. No PCP. History of CAD with CABG. Continued fever on 8/3. Seen by SLP.      8/5:  Ultrasound of retroperitoneum ordered to evaluate possible mechanical obstruction resulting in acute renal insufficiency. Low dose beta blocker and statin added. Accidentally found a 4.1 cm AAA, follow up CTA as below. Also found a high grade left renal artery stenosis. No prior history. Remote CABG. Vascular surgery consulted with pending surgical repair.       8/6:  Cardiology consult for surgical clear. On statin, ASA, beta blocker. EF normal on echo. Only relative patient has is stepson with matt relationship. Arrangements made with care management and risk management. ID consult for enterococcus UTI with recommendations. Rocephin changed to IV ampicillin until 8/16 prior to surgery. EVAR planned for 8/16.   Also recommend operative prophylaxis at the time of surgery by adding vancomycin to routine cefazolin.       8/12:  Antibiotics changed to vancomycin and zosyn due to persistently elevated WBC.       Today: WBC still 14.2 on 8/12. Vascular surgery discussing postponing surgery. Unclear etiology for persistent elevation. Will ask ID to assist.  vanc and zosyn still being given. Patient denies any complaints. Up in chair. Afebrile. Repeating all labs and urinalysis with culture. No cough or hypoxia.      Current Facility-Administered Medications   Medication Dose Route Frequency    piperacillin-tazobactam (ZOSYN) 4.5 g in 0.9% sodium chloride (MBP/ADV) 50 mL MBP  4.5 g IntraVENous Q8H    vancomycin (VANCOCIN) 1,000 mg in 0.9% sodium chloride (MBP/ADV) 250 mL  1,000 mg IntraVENous Q24H    enoxaparin (LOVENOX) injection 30 mg  30 mg SubCUTAneous Q24H    hydrALAZINE (APRESOLINE) tablet 50 mg  50 mg Oral TID    methylPREDNISolone (PF) (SOLU-MEDROL) injection 20 mg  20 mg IntraVENous Q12H    metoprolol tartrate (LOPRESSOR) tablet 25 mg  25 mg Oral Q12H    atorvastatin (LIPITOR) tablet 40 mg  40 mg Oral QHS    albuterol-ipratropium (DUO-NEB) 2.5 MG-0.5 MG/3 ML  3 mL Nebulization Q4H RT    budesonide (PULMICORT) 500 mcg/2 ml nebulizer suspension  500 mcg Nebulization BID RT    sodium chloride (NS) flush 5-10 mL  5-10 mL IntraVENous Q8H    sodium chloride (NS) flush 5-10 mL  5-10 mL IntraVENous PRN    acetaminophen (TYLENOL) tablet 650 mg  650 mg Oral Q4H PRN    ondansetron (ZOFRAN) injection 4 mg  4 mg IntraVENous Q4H PRN    aspirin delayed-release tablet 81 mg  81 mg Oral DAILY    baclofen (LIORESAL) tablet 10 mg  10 mg Oral BID    docusate sodium (COLACE) capsule 100 mg  100 mg Oral BID    ferrous sulfate tablet 325 mg  325 mg Oral ACB    finasteride (PROSCAR) tablet 5 mg  5 mg Oral DAILY    levothyroxine (SYNTHROID) tablet 100 mcg  100 mcg Oral ACB    nicotine (NICODERM CQ) 7 mg/24 hr patch 1 Patch  1 Patch TransDERmal Q24H    pantoprazole (PROTONIX) tablet 40 mg  40 mg Oral ACB        Review of Systems  A comprehensive review of systems was negative except for that written in the HPI.    Objective:     Visit Vitals    /44    Pulse 88    Temp 98.5 °F (36.9 °C)    Resp 18    Ht 5' 4\" (1.626 m)    Wt 67 kg (147 lb 9.6 oz)    SpO2 98%    BMI 25.34 kg/m2    O2 Flow Rate (L/min): 2 l/min O2 Device: Room air    Temp (24hrs), Av.9 °F (36.6 °C), Min:97.2 °F (36.2 °C), Max:98.5 °F (36.9 °C)        190 -  0700  In: 720 [P.O.:720]  Out: 211 [Urine:211]    General appearance: Oriented and alert, cooperative, denies pain. Up in chair. No dysuria. Eating and drinking. Head: Normocephalic, without obvious abnormality, atraumatic  Eyes: conjunctivae/corneas clear. PERRL  Throat: Lips, mucosa, and tongue normal. Teeth and gums normal  Neck: supple, symmetrical, trachea midline, no adenopathy, no JVD  Lungs: clear to auscultation bilaterally, no cough, wheezes, rales, rhonchi. Oxygen saturation 97% on room air. Heart: regular rate and rhythm, S1, S2 normal, no murmur, click, rub or gallop  Abdomen: soft, non-tender. Bowel sounds normal. No masses,  no organomegaly  Extremities: extremities normal, atraumatic, no cyanosis or edema  Skin: Skin color, texture, turgor normal. No rashes or lesions  Neurologic: Grossly normal   Additional comments:  Notes, orders, test results, vitals reviewed.        Data Review  Recent Results (from the past 24 hour(s))   MAGNESIUM    Collection Time: 17  7:47 AM   Result Value Ref Range    Magnesium 2.5 (H) 1.8 - 2.4 mg/dL     8/3:  ULTRASOUND RETROPERITONEUM:  A 4.1 cm abdominal aortic aneurysm with peripheral thrombus. Negative for hydronephrosis. Abnormally increased renal echogenicity. Simple renal cysts.      :  CXR:  Stable post surgical changes overlie the mediastinal silhouette. A left shoulder replacement is once again seen. Stable mild cardiomegaly is seen. Lungs are expanded without pneumothorax. No evolving consolidation, or pleural effusion is seen.  Only stable likely chronic interstitial prominence is seen most evident at the lung bases. IMPRESSION:   Stable cardiomegaly without evolving acute changes seen.     8/4:  CTA ABDOMEN WITH RUNOFF:  The abdominal aorta is patent. Below the takeoff of the renal arteries there is enlargement of the abdominal aorta with a somewhat saccular component to the right of the main flow lumen. The aneurysm measures 4.0 x 6.7 cm the aorta is of relatively normal size and contour at the level of the iliac arteries. The SMA and celiac axis are widely patent. There is a moderate stenosis of the proximal right renal artery. There is a high-grade stenosis of the left renal artery. It is patent but is high-grade. The iliac arteries are patent. There is a moderate stenosis of the distal common iliac artery. The right lower extremity shows bulky plaque at the common femoral artery. There is a moderate stenosis at this level. The SFA shows diffuse disc disease with areas of moderate and high-grade stenoses. The popliteal artery is patent. There is relatively good 2 vessel runoff to the level of the ankle via the peroneal  and anterior tibial arteries. The posterior tibial artery appears largely occluded. Michelle graph the left lower extremity mild stenosis of the common femoral artery. The SFA is occluded proximally. The profunda femoris artery is patent. The popliteal artery reconstitutes. There is relatively good 2 vessel runoff to the level of the ankle via the peroneal artery and anterior tibial artery. Upon review of the source images the lungs show chronic appearing changes in the bases. In addition there are bilateral pleural effusions right being larger than the left. The liver shows no gross masses. The gallbladder is relatively small with a single appearing radiopaque stone measuring approximately 9 mm in  diameter. The spleen and pancreas show no abnormal masses. The adrenal glands and kidneys show no abnormal masses. There are simple cyst of the left kidney measuring 2.5 cm in diameter. The bowel is unopacified as per CTA protocol. The bones are generally osteopenic. The spine shows mild degenerative change. IMPRESSION:  Infrarenal abdominal aortic aneurysm with saccular component measuring 4.0, x 6.7 cm. 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 High-grade left renal artery stenosis. Right lower extremity: Moderate stenosis of the common femoral artery and  diffuse high-grade disease of SFA. Left lower extremity: Occlusion of SFA reconstitution at the popliteal artery level. Bilateral pleural effusions. Cholelithiasis. recommend vascular surgical consultation.     8/10:  CXR:  Interstitial prominence which may represent edema. Small patchy density partially obscures right hemidiaphragm. There are sternal wires.      ECHOCARDIOGRAM:  -  Left ventricle: Systolic function was normal. Ejection fraction was estimated in the range of 60 % to 65 %. There were no regional wall motion abnormalities. There was mild concentric hypertrophy. Left ventricular diastolic function parameters suggest diastolic dysfunction. -  Right ventricle: There was mild pulmonary artery hypertension. -  Left atrium: The atrium was moderately to markedly dilated. -  Right atrium: The atrium was moderately dilated. -  Aortic valve: The valve was trileaflet. Leaflets exhibited mild to   Moderate calcification and reduced mobility. There was moderate regurgitation. The aortic valve area by the continuity equation was 2.05 cm2. The mean pressure gradient was 18 mmHg. The findings were most consistent with mild aortic stenosis. -  Mitral valve: There was mild annular calcification. There was mild to moderate regurgitation. -  Tricuspid valve:  There was mild regurgitation    Assessment/Plan:   CAP  AAA, 6.1 cm  Altered mental status with acute metabolic [id number](8/9/6833)  Hypothyroidism  Acute UTI   Fever   Hypertension, poorly controlled  Persistent leukocytosis of unclear etiology     PLAN:  Repeat urinalysis and urine culture  Re consult ID for assist with leukocytosis  Vascular holding surgery until cause for leukocytosis is apparent  Stat labs    Care Plan discussed with: MD YOLANDA, Patient    Signed By: Yashira Flynn NP     August 14, 2017

## 2017-08-14 NOTE — PROGRESS NOTES
Problem: Mobility Impaired (Adult and Pediatric)  Goal: *Acute Goals and Plan of Care (Insert Text)  STG:  (1.)Mr. Scheyder will move from supine to sit and sit to supine , scoot up and down and roll side to side with CONTACT GUARD ASSIST within 3 day(s). GOAL MET 8/4/2017  (2.)Mr. Scheyder will transfer from bed to chair and chair to bed with CONTACT GUARD ASSIST using the least restrictive device within 3 day(s). GOAL MET 8/14/2017  (3.)Mr. Scheyder will ambulate with CONTACT GUARD ASSIST for 30 feet with the least restrictive device within 3 day(s). GOAL MET 8/4/2017      LTG:  (1.)Mr. Scheyder will move from supine to sit and sit to supine , scoot up and down and roll side to side in bed with MODIFIED INDEPENDENCE within 7 day(s). (2.)Mr. Scheyder will transfer from bed to chair and chair to bed with MODIFIED INDEPENDENCE using the least restrictive device within 7 day(s). (3.)Mr. Scheyder will ambulate with SUPERVISION for 150+ feet with the least restrictive device within 7 day(s). PHYSICAL THERAPY: Daily Note, Treatment Day: 5th and AM 8/14/2017  INPATIENT: Hospital Day: 14  Payor: Rachel Marinelli / Plan: [facility] HUMANA MEDICARE CHOICE PPO/PFFS / Product Type: Managed Care Medicare /      NAME/AGE/GENDER: Zulema Saba is a 80 y.o. male          PRIMARY DIAGNOSIS: Abdominal aortic aneurysm (AAA) without rupture ([facility] [address] [I71.4] CAP (community acquired pneumonia) CAP (community acquired pneumonia)  Procedure(s) (LRB):  AORTIC ABDOMINAL ANEURYSM REPAIR ENDOVASCULAR (EVAR)  GEN VS SPINAL ROOM 836 (N/A)     ICD-10: Treatment Diagnosis:       · Generalized Muscle Weakness (M62.81)  · Difficulty in walking, Not elsewhere classified (R26.2)   Precaution/Allergies:  Review of patient's allergies indicates no known allergies. ASSESSMENT:      Mr. Nery Palomo was supine upon contact and agreeable to PT. Patient able to perform bed mobility and  transfer to standing with supervision.  Once standing patient able to ambulate 450' without assistive device demonstrating occasional path deviations/increased trunk sway but no true LOB noted although patient is unsteady on his feet without use of rolling walker. Patient returns to recliner chair where he was left sitting up with needs in reach. Overall good progress towards physical therapy goals. Will continue efforts. Above goals in red have been met. This section established at most recent assessment   PROBLEM LIST (Impairments causing functional limitations):  1. Decreased Strength  2. Decreased ADL/Functional Activities  3. Decreased Transfer Abilities  4. Decreased Ambulation Ability/Technique  5. Decreased Balance  6. Decreased Activity Tolerance  7. Decreased Pacing Skills  8. Increased Fatigue  9. Increased Shortness of Breath  10. Decreased [place] with Home Exercise Program    INTERVENTIONS PLANNED: (Benefits and precautions of physical therapy have been discussed with the patient.)  1. Balance Exercise  2. Bed Mobility  3. Family Education  4. Gait Training  5. Home Exercise Program (HEP)  6. Neuromuscular Re-education/Strengthening  7. Range of Motion (ROM)  8. Therapeutic Activites  9. Therapeutic Exercise/Strengthening  10. Transfer Training  11. Group Therapy      TREATMENT PLAN: Frequency/Duration: 3 times a week for duration of hospital stay  Rehabilitation Potential For Stated Goals: FAIR      RECOMMENDED REHABILITATION/EQUIPMENT: (at time of discharge pending progress): Continue Skilled Therapy and Rehab. HISTORY:   History of Present Injury/Illness (Reason for Referral):  See H&P below  79 y/o male with history of HTN, hypothyroidism, \"renal disorder\" and dementia is sent in transfer from [facility] with diagnosis of UTI, fever, worsening renal failure, hypoxemia and URI/pna. He is alert but a poor historian. His main complaint is of shivering. No family or visitors present to give more history. He states he lives with family.  Workup at [facility] showed a UTI, temp 100.7, O2 sat 90% RA with PaO2 69. Cr up to 1.8 from 1.3. WBC ct 13.1, lactic acid 1.5 and . Chest xray was reported as normal. Do not know what patient's mental status baseline is to know if he is having delerium from acute illness or not. He was given IVF, rocephin and azithromycin and transferred to [address] [place] [address]. Patient denies chest pain, shortness of breath, nausea or vomiting. Has has malaise, weakness, cough and congestion. Past Medical History/Comorbidities:   Mr. Eileen Linton  has a past medical history of CAD (coronary artery disease). Mr. Eileen Linton  has a past surgical history that includes cardiac surg procedure unlist.  Social History/Living Environment:   Home Environment: Private residence  # Steps to Enter: 6  Rails to Enter: Yes  Hand Rails : Bilateral  One/Two Story Residence: One story  Living Alone: Yes  Support Systems: Child(jessica)  Patient Expects to be Discharged to[de-identified] Private residence  Current DME Used/Available at Home: Alfonse Oneill, rollator, [place], straight, Shower chair  Tub or Shower Type: Tub/Shower combination  Prior Level of Function/Work/Activity:  Lives alone, use of rollator for gait, indep with ADLs, 0 falls. Number of Personal Factors/Comorbidities that affect the Plan of Care: 1-2: MODERATE COMPLEXITY   EXAMINATION:   Most Recent Physical Functioning:   Gross Assessment:                  Posture:     Balance:  Sitting: Intact  Standing: Impaired  Standing - Static: Good  Standing - Dynamic : Fair Bed Mobility:  Supine to Sit: Supervision  Sit to Supine:  (NT)  Wheelchair Mobility:     Transfers:  Sit to Stand: Supervision  Stand to Sit: Supervision  Gait:     Base of Support: Narrowed  Speed/[place]: Pace decreased (<100 feet/min); Slow  Step Length: Right shortened;Left shortened  Gait Abnormalities: Decreased step clearance;Trunk sway increased; Path deviations  Distance (ft): 450 Feet (ft)  Assistive Device:  (Occasionally holding onto hallway railing)  Ambulation - Level of Assistance: Contact guard assistance  Interventions: Safety awareness training; Tactile cues; Verbal cues       Body Structures Involved:  1. Heart  2. Lungs  3. Bones  4. Joints  5. Muscles Body Functions Affected:  1. Sensory/Pain  2. Cardio  3. Respiratory  4. Neuromusculoskeletal  5. Movement Related Activities and Participation Affected:  1. General Tasks and Demands  2. Mobility  3. Self Care  4. Domestic Life  5. Interpersonal Interactions and Relationships  6. Community, Social and [place]   Number of elements that affect the Plan of Care: 4+: HIGH COMPLEXITY   CLINICAL PRESENTATION:   Presentation: Evolving clinical presentation with changing clinical characteristics: MODERATE COMPLEXITY   CLINICAL DECISION MAKIN [facility] Mobility Inpatient Short Form  How much difficulty does the patient currently have. .. Unable A Lot A Little None   1. Turning over in bed (including adjusting bedclothes, sheets and blankets)? [ ] 1   [ ] 2   [X] 3   [ ] 4   2. Sitting down on and standing up from a chair with arms ( e.g., wheelchair, bedside commode, etc.)   [ ] 1   [X] 2   [ ] 3   [ ] 4   3. Moving from lying on back to sitting on the side of the bed? [ ] 1   [ ] 2   [X] 3   [ ] 4   How much help from another person does the patient currently need. .. Total A Lot A Little None   4. Moving to and from a bed to a chair (including a wheelchair)? [ ] 1   [X] 2   [ ] 3   [ ] 4   5. Need to walk in hospital room? [ ] 1   [X] 2   [ ] 3   [ ] 4   6. Climbing 3-5 steps with a railing? [X] 1   [ ] 2   [ ] 3   [ ] 4   © , Trustees of [address] [place] [id number], under license to [employer]. All rights reserved    Score:  Initial: 13 Most Recent: X (Date: -- )     Interpretation of Tool:  Represents activities that are increasingly more difficult (i.e. Bed mobility, Transfers, Gait).        Score 24 23 22-20 19-15 14-10 9-7 6       Modifier CH CI CJ CK CL CM CN         · Mobility - Walking and Moving Around:               - CURRENT STATUS:    CL - 60%-79% impaired, limited or restricted               - GOAL STATUS:           CK - 40%-59% impaired, limited or restricted               - D/C STATUS:                       ---------------To be determined---------------  Payor: HUMANA MEDICARE / Plan: [facility] HUMANA MEDICARE CHOICE PPO/PFFS / Product Type: Managed Care Medicare /       Medical Necessity:     · Patient is expected to demonstrate progress in strength, range of motion, balance, coordination and functional technique to decrease assistance required with gait, transfers, and functional mobility. Reason for Services/Other Comments:  · Patient continues to require skilled intervention due to decreased strength, decreased balance, decreased functional tolerance, decreased cardiopulmonary endurance affecting participation in basic ADLs and functional tasks. Use of outcome tool(s) and clinical judgement create a POC that gives a: Questionable prediction of patient's progress: MODERATE COMPLEXITY                 TREATMENT:   (In addition to Assessment/Re-Assessment sessions the following treatments were rendered)   Pre-treatment Symptoms/Complaints:  No complaints  Pain: Initial:   Pain Intensity 1: 0  Post Session:  0/10      Therapeutic Activity: (    16 Minutes): Therapeutic activities including bed mobility training, transfer training, static/dynamic standing balance activities, ambulation on level ground, posture training, instruction in sequencing with rolling walker, and patient education to improve mobility, strength, balance and activity tolerance. Required moderate Safety awareness training; Tactile cues; Verbal cues to promote static and dynamic balance in standing and promote coordination of bilateral, lower extremity(s).        Braces/Orthotics/Lines/Etc:   · IV  Treatment/Session Assessment:    · Response to Treatment:  See above  · Interdisciplinary Collaboration:  · Physical Therapy Assistant and Registered Nurse  · After treatment position/precautions:  · Up in chair, Bed alarm/tab alert on, Bed/Chair-wheels locked, Call light within reach and RN notified  · Compliance with Program/Exercises: Will assess as treatment progresses. · Recommendations/Intent for next treatment session: \"Next visit will focus on advancements to more challenging activities and reduction in assistance provided\".   Total Treatment Duration  PT Patient Time In/Time Out  Time In: 1022  Time Out: [address], PTA

## 2017-08-14 NOTE — PROGRESS NOTES
Patient stable with no complaints at this time. Patient resting in bed with eyes closed. Call light within reach and patient instructed to call if assistance is needed.   Report to be called to oncoming RN 7p-7a

## 2017-08-14 NOTE — PROGRESS NOTES
Infectious Disease Progress Note    Today's Date: 8/14/2017   Admit Date: 8/1/2017    Impression:   · Enterococcus UTI (7/31 - UCX at [facility]), Ucx positive 8/4: while on therapy with lower CFU: completed 7 days of Ampicillin 8/11  · Delirium  · Fever: resolved  · Leukocytosis: steroid induced, started on Solumedrol 8/6 for possible COPD exacerbation   · Large AAA requiring repair, associated findings of high grade renal stenosis/r CFA and SFA stenosis  · Acute on chronic kidney disease    Plan:     · He has completed more than enough antbx for UTI  · No indications of ongoing infection, leukocytosis probably related to ongoing IV steroids: he has been on solumedrol 40mg daily since 8/6, will discontinue this  · Discontinue Vancomycin and Zosyn  · Recommend operative prophylaxis at the time of surgery that covers the organism - would add IV vancomycin to routine cefazolin for operative prophylaxis. No contraindication to sx at this time      Anti-infectives:   Vanc/Zosyn (8/11-8/14)  Ampicillin 8/6-8/11  Ceftriaxone/Zith (7/31-8/1)    Subjective:   Re-consult:  August 14, 2017  Referring Physician: Dr. Kathy Oliver    Patient is a 80 y.o. male transferred from [facility] ER to [facility] on August 1 with fever and hypoxia. The patient has a history of dementia and reportedly lives at home with his family. He was brought to the emergency room at [facility] on July 31 with low-grade fever. He was also noted to have some hypoxia which improved with a breathing treatment. Patient was noted to have a leukocytosis with acute kidney injury. He was given Rocephin and azithromycin and transferred to [facility] due to his insurance status. Blood cultures were obtained in the emergency room at [facility] which were negative, but his urine culture was positive for greater than 100,000 copies of Enterococcus faecalis which is ampicillin sensitive. His chest imaging showed no evidence of pneumonia.  Due to his acute kidney injury a renal ultrasound was done which showed no evidence of hydronephrosis but did show a large abdominal aortic aneurysm with thrombus. CT angiogram was obtained which showed a large infrarenal aortic aneurysm with a saccular component. Blood cultures at [address] [place] [address] have been negative and his urine culture has 3000 copies of gram-positive cocci. ID was consulted and recommended UTI treatment and pre-op ppx. He completed 7 days of Ampicillin 17, and was transitioned to Vanc and Zosyn sec to leukocytosis. He has remain afebrile, however his AAA sx repair is on hold sed to leukocytosis, and ID is consulted to further evaluate this. Pt has no acute complaints, he denies dysuria, SOB/cough, rash, nausea/vomiting/diarrhea, no abdominal pain, reports constipation. No wounds/drainage. No swelling of either extremity, no further fever, chills, rigors. No Known Allergies     Review of Systems:  A comprehensive review of systems was negative except for that written in the History of Present Illness. Objective:     Visit Vitals    /57    Pulse 66    Temp 97.5 °F (36.4 °C)    Resp 16    Ht 5' 4\" (1.626 m)    Wt 67 kg (147 lb 9.6 oz)    SpO2 96%    BMI 25.34 kg/m2     Temp (24hrs), Av.8 °F (36.6 °C), Min:97.2 °F (36.2 °C), Max:98.5 °F (36.9 °C)       Lines:  Peripheral IV:            Physical Exam:    General:  Alert, cooperative, no distress   Eyes:  Sclera anicteric. Pupils equally round and reactive to light. Mouth/Throat: Mucous membranes normal, oral pharynx clear   Neck: Supple   Lungs:   Clear to auscultation bilaterally, good effort   CV:  Regular rate and rhythm, systolic murmur at RUSB   Abdomen:   Soft, non-tender.  bowel sounds normal. non-distended   Extremities: No cyanosis or edema   Skin: Skin color, texture, turgor normal. no acute rash or lesions   Lymph nodes: Cervical and supraclavicular normal   Musculoskeletal: No swelling or deformity   Lines/Devices:  Intact, no erythema, drainage or tenderness   Psych: Alert and responsive         Data Review:     CBC:  Recent Labs      17   1105  17   0715   WBC  11.2*  14.2*   GRANS  77   --    MONOS  6   --    EOS  0*   --    ANEU  8.6*   --    ABL  1.9   --    HGB  11.9*  12.6*   HCT  35.5*  38.3*   PLT  377  425       BMP:  Recent Labs      17   1105  17   0654  1715   CREA  1.66*  1.73*  1.54*   BUN  26*  26*  28*   NA  143  145  143   K  3.8  3.9  4.1   CL  110*  109*  109*   CO2  26  21  27   AGAP  7  15  7   GLU  124*  109*  118*       LFTS:  Recent Labs      17   1105  1715   TBILI  0.2  0.3   ALT  23  25   SGOT  14*  18   AP  71  82   TP  6.2*  6.7   ALB  2.6*  2.7*       Microbiology:     All Micro Results     Procedure Component Value Units Date/Time    CULTURE, URINE [[id number]]     Order Status:  Sent Specimen:  Urine from Clean catch     CULTURE, BLOOD [[id number]] Collected:  17 1310    Order Status:  Completed Specimen:  Blood from Blood Updated:  17 1150     Special Requests: LEFT ANTECUBITAL        Culture result: NO GROWTH 5 DAYS       CULTURE, BLOOD [[id number]] Collected:  17 1231    Order Status:  Completed Specimen:  Blood from Blood Updated:  17 07     Special Requests: RIGHT HAND        Culture result: NO GROWTH 5 DAYS       CULTURE, BLOOD [[id number]] Collected:  17 1224    Order Status:  Completed Specimen:  Blood from Blood Updated:  17 07     Special Requests: LEFT WRIST        Culture result: NO GROWTH 5 DAYS       CULTURE, URINE [[id number]]  (Abnormal)  (Susceptibility) Collected:  17 1710    Order Status:  Completed Specimen:  Urine from Cath Urine Updated:  17 0739     Special Requests: NO SPECIAL REQUESTS        Culture result:         3000 COLONIES/mL ENTEROCOCCUS FAECALIS GROUP D (A)          Imagin/4/17 CT abd:     IMPRESSION:  1. Infrarenal abdominal aortic aneurysm with saccular component measuring 4.0, x  6.7 cm. 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 2. High-grade left renal artery stenosis. 3. Right lower extremity: Moderate stenosis of the common femoral artery and  diffuse high-grade disease of SFA. 4. Left lower extremity: Occlusion of SFA reconstitution at the popliteal artery  level. 5. Bilateral pleural effusions. Cholelithiasis. 8/4/17 CXR:     Findings:   Stable post surgical changes overlie the mediastinal silhouette. A left shoulder  replacement is once again seen. Stable mild cardiomegaly is seen. Lungs are  expanded without pneumothorax. No evolving consolidation, or pleural effusion is  seen. Only stable likely chronic interstitial prominence is seen most evident at  the lung bases. 8/3/17 US retroperitoneum:     IMPRESSION:     1) A 4.1 cm abdominal aortic aneurysm with peripheral thrombus. 2) Negative for hydronephrosis. Abnormally increased renal echogenicity. 3) Simple renal cysts.     Signed By: Adrienne Garner NP     August 14, 2017

## 2017-08-14 NOTE — PROGRESS NOTES
Planning for endovascular abdominal aortic aneurysm repair on 8/16/2017. I discussed the procedure with the patient in detail on multiple occasions. He indicates that he understands. I have not had an opportunity to speak with his family as the family dynamic is somewhat unusual.  We will continue to try to reach out to them to discuss our plan with them. He remains afebrile however his white count was 14,000 today. We will continue to follow this for evidence of ongoing infection and if we need to reschedule his procedure we can.

## 2017-08-14 NOTE — PROGRESS NOTES
Patient in bed resting with no complaints at this time. Patient is alert and orientated with no distress noted. IV intact and patent with no s/s of infection noted. Respirations even and unlabored with heart rate regular. Patient able to ambulate independently without assistance during the day but needs posy at night. Bed in low locked position with call light within reach. Patient instructed to call if assistance is needed. Will continue to monitor.

## 2017-08-15 ENCOUNTER — ANESTHESIA EVENT (OUTPATIENT)
Dept: SURGERY | Age: 82
DRG: 853 | End: 2017-08-15
Payer: MEDICARE

## 2017-08-15 LAB
APPEARANCE UR: CLEAR
BILIRUB UR QL: NEGATIVE
COLOR UR: YELLOW
GLUCOSE UR STRIP.AUTO-MCNC: NEGATIVE MG/DL
HGB UR QL STRIP: NEGATIVE
KETONES UR QL STRIP.AUTO: NEGATIVE MG/DL
LEUKOCYTE ESTERASE UR QL STRIP.AUTO: NEGATIVE
MAGNESIUM SERPL-MCNC: 2.6 MG/DL (ref 1.8–2.4)
NITRITE UR QL STRIP.AUTO: NEGATIVE
PH UR STRIP: 6.5 [PH] (ref 5–9)
PROT UR STRIP-MCNC: NEGATIVE MG/DL
SP GR UR REFRACTOMETRY: 1.01 (ref 1–1.02)
UROBILINOGEN UR QL STRIP.AUTO: 0.2 EU/DL (ref 0.2–1)

## 2017-08-15 PROCEDURE — 74011000250 HC RX REV CODE- 250: Performed by: NURSE PRACTITIONER

## 2017-08-15 PROCEDURE — 74011250637 HC RX REV CODE- 250/637: Performed by: HOSPITALIST

## 2017-08-15 PROCEDURE — 65660000000 HC RM CCU STEPDOWN

## 2017-08-15 PROCEDURE — 87086 URINE CULTURE/COLONY COUNT: CPT | Performed by: NURSE PRACTITIONER

## 2017-08-15 PROCEDURE — 74011250637 HC RX REV CODE- 250/637: Performed by: INTERNAL MEDICINE

## 2017-08-15 PROCEDURE — 65270000029 HC RM PRIVATE

## 2017-08-15 PROCEDURE — 74011250636 HC RX REV CODE- 250/636: Performed by: INTERNAL MEDICINE

## 2017-08-15 PROCEDURE — 36415 COLL VENOUS BLD VENIPUNCTURE: CPT | Performed by: INTERNAL MEDICINE

## 2017-08-15 PROCEDURE — 94760 N-INVAS EAR/PLS OXIMETRY 1: CPT

## 2017-08-15 PROCEDURE — 94640 AIRWAY INHALATION TREATMENT: CPT

## 2017-08-15 PROCEDURE — 83735 ASSAY OF MAGNESIUM: CPT | Performed by: INTERNAL MEDICINE

## 2017-08-15 PROCEDURE — 81003 URINALYSIS AUTO W/O SCOPE: CPT | Performed by: NURSE PRACTITIONER

## 2017-08-15 RX ORDER — ENOXAPARIN SODIUM 100 MG/ML
30 INJECTION SUBCUTANEOUS EVERY 24 HOURS
Status: DISCONTINUED | OUTPATIENT
Start: 2017-08-17 | End: 2017-08-22 | Stop reason: HOSPADM

## 2017-08-15 RX ORDER — CEFAZOLIN SODIUM IN 0.9 % NACL 2 G/50 ML
2 INTRAVENOUS SOLUTION, PIGGYBACK (ML) INTRAVENOUS
Status: COMPLETED | OUTPATIENT
Start: 2017-08-15 | End: 2017-08-16

## 2017-08-15 RX ADMIN — BACLOFEN 10 MG: 10 TABLET ORAL at 16:51

## 2017-08-15 RX ADMIN — Medication 5 ML: at 21:42

## 2017-08-15 RX ADMIN — METOPROLOL TARTRATE 25 MG: 25 TABLET ORAL at 07:56

## 2017-08-15 RX ADMIN — Medication 5 ML: at 05:55

## 2017-08-15 RX ADMIN — ENOXAPARIN SODIUM 30 MG: 30 INJECTION SUBCUTANEOUS at 07:56

## 2017-08-15 RX ADMIN — HYDRALAZINE HYDROCHLORIDE 50 MG: 50 TABLET ORAL at 21:41

## 2017-08-15 RX ADMIN — IPRATROPIUM BROMIDE AND ALBUTEROL SULFATE 3 ML: 2.5; .5 SOLUTION RESPIRATORY (INHALATION) at 15:09

## 2017-08-15 RX ADMIN — BUDESONIDE 500 MCG: 0.5 SUSPENSION RESPIRATORY (INHALATION) at 20:24

## 2017-08-15 RX ADMIN — IPRATROPIUM BROMIDE AND ALBUTEROL SULFATE 3 ML: 2.5; .5 SOLUTION RESPIRATORY (INHALATION) at 04:06

## 2017-08-15 RX ADMIN — IPRATROPIUM BROMIDE AND ALBUTEROL SULFATE 3 ML: 2.5; .5 SOLUTION RESPIRATORY (INHALATION) at 20:24

## 2017-08-15 RX ADMIN — FERROUS SULFATE TAB 325 MG (65 MG ELEMENTAL FE) 325 MG: 325 (65 FE) TAB at 05:53

## 2017-08-15 RX ADMIN — HYDRALAZINE HYDROCHLORIDE 50 MG: 50 TABLET ORAL at 07:53

## 2017-08-15 RX ADMIN — METOPROLOL TARTRATE 25 MG: 25 TABLET ORAL at 21:42

## 2017-08-15 RX ADMIN — DOCUSATE SODIUM 100 MG: 100 CAPSULE, LIQUID FILLED ORAL at 16:52

## 2017-08-15 RX ADMIN — LEVOTHYROXINE SODIUM 100 MCG: 100 TABLET ORAL at 05:53

## 2017-08-15 RX ADMIN — Medication 5 ML: at 14:30

## 2017-08-15 RX ADMIN — FINASTERIDE 5 MG: 5 TABLET, FILM COATED ORAL at 07:54

## 2017-08-15 RX ADMIN — BUDESONIDE 500 MCG: 0.5 SUSPENSION RESPIRATORY (INHALATION) at 07:49

## 2017-08-15 RX ADMIN — ASPIRIN 81 MG: 81 TABLET, COATED ORAL at 07:52

## 2017-08-15 RX ADMIN — DOCUSATE SODIUM 100 MG: 100 CAPSULE, LIQUID FILLED ORAL at 07:55

## 2017-08-15 RX ADMIN — PANTOPRAZOLE SODIUM 40 MG: 40 TABLET, DELAYED RELEASE ORAL at 05:54

## 2017-08-15 RX ADMIN — BACLOFEN 10 MG: 10 TABLET ORAL at 07:54

## 2017-08-15 RX ADMIN — IPRATROPIUM BROMIDE AND ALBUTEROL SULFATE 3 ML: 2.5; .5 SOLUTION RESPIRATORY (INHALATION) at 07:48

## 2017-08-15 RX ADMIN — IPRATROPIUM BROMIDE AND ALBUTEROL SULFATE 3 ML: 2.5; .5 SOLUTION RESPIRATORY (INHALATION) at 11:41

## 2017-08-15 RX ADMIN — ATORVASTATIN CALCIUM 40 MG: 40 TABLET, FILM COATED ORAL at 21:41

## 2017-08-15 RX ADMIN — HYDRALAZINE HYDROCHLORIDE 50 MG: 50 TABLET ORAL at 16:51

## 2017-08-15 NOTE — PROGRESS NOTES
OT note:  Pt in meeting with family and HCPOA. Nursing staff expressed to this therapist that pt is currently still completing self care and mobility independently. Pt is scheduled for a procedure tomorrow. Will follow.   Sandra Arndt

## 2017-08-15 NOTE — PROGRESS NOTES
AL called  to assist pt's stepson with HCPOA application. AL gave pt's stepson the number for vascular surgery as he had questions about pt's upcoming surgery.

## 2017-08-15 NOTE — PROGRESS NOTES
Hospitalist Progress Note    Subjective:   Daily Progress Note: 8/15/2017 11:23 AM    History:  History:  Patient with history of hypertension, CKD, dementia,  Hypothyroidism sent in transfer from [facility] 8/1/17 for direct admission for UTI, fever, worsening renal failure, hypoxemia and URI/pneumonia. Poor historian, lives with family who are not present on admission. GHS workup included creatinine of 1.8 up from baseline of 1.3. WBC:  13.1, lactic acid 1.5, BNP: 102, temp 100.7, oxygen sat 90% on room air with PaO2 of 69. CXR reported as normal.  Administered rocephin, zithromax and fluids there. Only complaints were malaise, weakness, cough and congestion. He was admitted on rocephin and zithromax, hold ACE and HCTZ, bedrest, IVF, nebs. Seen by OT, PT, SW.  No cultures done at [facility], however old records indicate enterococcus UTI at [facility] on 7/31/  Requiring 3 liters of oxygen to keep oxygen sat in the 90's. No PCP. History of CAD with CABG. Continued fever on 8/3. Seen by SLP.      8/5:  Ultrasound of retroperitoneum ordered to evaluate possible mechanical obstruction resulting in acute renal insufficiency. Low dose beta blocker and statin added. Accidentally found a 4.1 cm AAA, follow up CTA as below. Also found a high grade left renal artery stenosis. No prior history. Remote CABG. Vascular surgery consulted with pending surgical repair.       8/6:  Cardiology consult for surgical clear. On statin, ASA, beta blocker. EF normal on echo. Only relative patient has is stepson with matt relationship. Arrangements made with care management and risk management. ID consult for enterococcus UTI with recommendations. Rocephin changed to IV ampicillin until 8/16 prior to surgery. EVAR planned for 8/16.   Also recommend operative prophylaxis at the time of surgery by adding vancomycin to routine cefazolin.       8/12:  Antibiotics changed to vancomycin and zosyn due to persistently elevated WBC.       8/15: Abx were stopped by ID since patient completed full Abx treatment. Leukocytosis caused by steroids. ID recommends pre-op Vanc and Cefazolin. Cardiology to give final pre-op clearance. Current Facility-Administered Medications   Medication Dose Route Frequency    enoxaparin (LOVENOX) injection 30 mg  30 mg SubCUTAneous Q24H    hydrALAZINE (APRESOLINE) tablet 50 mg  50 mg Oral TID    metoprolol tartrate (LOPRESSOR) tablet 25 mg  25 mg Oral Q12H    atorvastatin (LIPITOR) tablet 40 mg  40 mg Oral QHS    albuterol-ipratropium (DUO-NEB) 2.5 MG-0.5 MG/3 ML  3 mL Nebulization Q4H RT    budesonide (PULMICORT) 500 mcg/2 ml nebulizer suspension  500 mcg Nebulization BID RT    sodium chloride (NS) flush 5-10 mL  5-10 mL IntraVENous Q8H    sodium chloride (NS) flush 5-10 mL  5-10 mL IntraVENous PRN    acetaminophen (TYLENOL) tablet 650 mg  650 mg Oral Q4H PRN    ondansetron (ZOFRAN) injection 4 mg  4 mg IntraVENous Q4H PRN    aspirin delayed-release tablet 81 mg  81 mg Oral DAILY    baclofen (LIORESAL) tablet 10 mg  10 mg Oral BID    docusate sodium (COLACE) capsule 100 mg  100 mg Oral BID    ferrous sulfate tablet 325 mg  325 mg Oral ACB    finasteride (PROSCAR) tablet 5 mg  5 mg Oral DAILY    levothyroxine (SYNTHROID) tablet 100 mcg  100 mcg Oral ACB    nicotine (NICODERM CQ) 7 mg/24 hr patch 1 Patch  1 Patch TransDERmal Q24H    pantoprazole (PROTONIX) tablet 40 mg  40 mg Oral ACB        Review of Systems  A comprehensive review of systems was negative except for that written in the HPI.     Objective:     Visit Vitals    /57 (BP 1 Location: Right arm, BP Patient Position: At rest)    Pulse 77    Temp 97.9 °F (36.6 °C)    Resp 17    Ht 5' 4\" (1.626 m)    Wt 67 kg (147 lb 9.6 oz)    SpO2 95%    BMI 25.34 kg/m2    O2 Flow Rate (L/min): 2 l/min O2 Device: Room air    Temp (24hrs), Av.1 °F (36.7 °C), Min:97.5 °F (36.4 °C), Max:98.7 °F (37.1 °C)    08/15 0701 - 08/15 1900  In: 240 [P.O.:240]  Out: -   08/13 1901 - 08/15 0700  In: 720 [P.O.:720]  Out: 4 [Urine:4]    General appearance: Oriented and alert, cooperative, denies pain. Up in chair. No dysuria. Eating and drinking. Head: Normocephalic, without obvious abnormality, atraumatic  Eyes: conjunctivae/corneas clear. PERRL  Throat: Lips, mucosa, and tongue normal. Teeth and gums normal  Neck: supple, symmetrical, trachea midline, no adenopathy, no JVD  Lungs: clear to auscultation bilaterally, no cough, wheezes, rales, rhonchi. Heart: regular rate and rhythm, S1, S2 normal, no murmur, click, rub or gallop  Abdomen: soft, non-tender. Bowel sounds normal. No masses,  no organomegaly  Extremities: extremities normal, atraumatic, no cyanosis or edema  Skin: Skin color, texture, turgor normal. No rashes or lesions  Neurologic: Grossly normal   Additional comments:  Notes, orders, test results, vitals reviewed.        Data Review  Recent Results (from the past 24 hour(s))   VANCOMYCIN, TROUGH    Collection Time: 08/14/17 11:05 AM   Result Value Ref Range    Vancomycin,trough 12.7 5 - 20 ug/mL   C REACTIVE PROTEIN, QT    Collection Time: 08/14/17 11:05 AM   Result Value Ref Range    C-Reactive protein 0.4 0.0 - 0.9 mg/dL   CBC WITH AUTOMATED DIFF    Collection Time: 08/14/17 11:05 AM   Result Value Ref Range    WBC 11.2 (H) 4.3 - 11.1 K/uL    RBC 3.82 (L) 4.23 - 5.67 M/uL    HGB 11.9 (L) 13.6 - 17.2 g/dL    HCT 35.5 (L) 41.1 - 50.3 %    MCV 92.9 79.6 - 97.8 FL    MCH 31.2 26.1 - 32.9 PG    MCHC 33.5 31.4 - 35.0 g/dL    RDW 15.4 (H) 11.9 - 14.6 %    PLATELET 764 861 - 074 K/uL    MPV 10.3 (L) 10.8 - 14.1 FL    DF AUTOMATED      NEUTROPHILS 77 43 - 78 %    LYMPHOCYTES 17 13 - 44 %    MONOCYTES 6 4.0 - 12.0 %    EOSINOPHILS 0 (L) 0.5 - 7.8 %    BASOPHILS 0 0.0 - 2.0 %    IMMATURE GRANULOCYTES 0.4 0.0 - 5.0 %    ABS. NEUTROPHILS 8.6 (H) 1.7 - 8.2 K/UL    ABS. LYMPHOCYTES 1.9 0.5 - 4.6 K/UL    ABS. MONOCYTES 0.6 0.1 - 1.3 K/UL    ABS. EOSINOPHILS 0.0 0.0 - 0.8 K/UL    ABS. BASOPHILS 0.0 0.0 - 0.2 K/UL    ABS. IMM. GRANS. 0.1 0.0 - 0.5 K/UL   MAGNESIUM    Collection Time: 08/14/17 11:05 AM   Result Value Ref Range    Magnesium 2.4 1.8 - 2.4 mg/dL   METABOLIC PANEL, COMPREHENSIVE    Collection Time: 08/14/17 11:05 AM   Result Value Ref Range    Sodium 143 136 - 145 mmol/L    Potassium 3.8 3.5 - 5.1 mmol/L    Chloride 110 (H) 98 - 107 mmol/L    CO2 26 21 - 32 mmol/L    Anion gap 7 7 - 16 mmol/L    Glucose 124 (H) 65 - 100 mg/dL    BUN 26 (H) 8 - 23 MG/DL    Creatinine 1.66 (H) 0.8 - 1.5 MG/DL    GFR est AA 51 (L) >60 ml/min/1.73m2    GFR est non-AA 42 (L) >60 ml/min/1.73m2    Calcium 8.4 8.3 - 10.4 MG/DL    Bilirubin, total 0.2 0.2 - 1.1 MG/DL    ALT (SGPT) 23 12 - 65 U/L    AST (SGOT) 14 (L) 15 - 37 U/L    Alk. phosphatase 71 50 - 136 U/L    Protein, total 6.2 (L) 6.3 - 8.2 g/dL    Albumin 2.6 (L) 3.2 - 4.6 g/dL    Globulin 3.6 (H) 2.3 - 3.5 g/dL    A-G Ratio 0.7 (L) 1.2 - 3.5     SED RATE, AUTOMATED    Collection Time: 08/14/17 11:05 AM   Result Value Ref Range    Sed rate, automated 15 0 - 20 mm/hr   URINALYSIS W/ RFLX MICROSCOPIC    Collection Time: 08/15/17  6:00 AM   Result Value Ref Range    Color YELLOW      Appearance CLEAR      Specific gravity 1.013 1.001 - 1.023      pH (UA) 6.5 5.0 - 9.0      Protein NEGATIVE  NEG mg/dL    Glucose NEGATIVE  mg/dL    Ketone NEGATIVE  NEG mg/dL    Bilirubin NEGATIVE  NEG      Blood NEGATIVE  NEG      Urobilinogen 0.2 0.2 - 1.0 EU/dL    Nitrites NEGATIVE  NEG      Leukocyte Esterase NEGATIVE  NEG     MAGNESIUM    Collection Time: 08/15/17  7:22 AM   Result Value Ref Range    Magnesium 2.6 (H) 1.8 - 2.4 mg/dL     8/3:  ULTRASOUND RETROPERITONEUM:  A 4.1 cm abdominal aortic aneurysm with peripheral thrombus. Negative for hydronephrosis. Abnormally increased renal echogenicity. Simple renal cysts.      8/4:  CXR:  Stable post surgical changes overlie the mediastinal silhouette.  A left shoulder replacement is once again seen. Stable mild cardiomegaly is seen. Lungs are expanded without pneumothorax. No evolving consolidation, or pleural effusion is seen. Only stable likely chronic interstitial prominence is seen most evident at the lung bases. IMPRESSION:   Stable cardiomegaly without evolving acute changes seen.     8/4:  CTA ABDOMEN WITH RUNOFF:  The abdominal aorta is patent. Below the takeoff of the renal arteries there is enlargement of the abdominal aorta with a somewhat saccular component to the right of the main flow lumen. The aneurysm measures 4.0 x 6.7 cm the aorta is of relatively normal size and contour at the level of the iliac arteries. The SMA and celiac axis are widely patent. There is a moderate stenosis of the proximal right renal artery. There is a high-grade stenosis of the left renal artery. It is patent but is high-grade. The iliac arteries are patent. There is a moderate stenosis of the distal common iliac artery. The right lower extremity shows bulky plaque at the common femoral artery. There is a moderate stenosis at this level. The SFA shows diffuse disc disease with areas of moderate and high-grade stenoses. The popliteal artery is patent. There is relatively good 2 vessel runoff to the level of the ankle via the peroneal  and anterior tibial arteries. The posterior tibial artery appears largely occluded. Michelle graph the left lower extremity mild stenosis of the common femoral artery. The SFA is occluded proximally. The profunda femoris artery is patent. The popliteal artery reconstitutes. There is relatively good 2 vessel runoff to the level of the ankle via the peroneal artery and anterior tibial artery. Upon review of the source images the lungs show chronic appearing changes in the bases. In addition there are bilateral pleural effusions right being larger than the left. The liver shows no gross masses.  The gallbladder is relatively small with a single appearing radiopaque stone measuring approximately 9 mm in  diameter. The spleen and pancreas show no abnormal masses. The adrenal glands and kidneys show no abnormal masses. There are simple cyst of the left kidney measuring 2.5 cm in diameter. The bowel is unopacified as per CTA protocol. The bones are generally osteopenic. The spine shows mild degenerative change. IMPRESSION:  Infrarenal abdominal aortic aneurysm with saccular component measuring 4.0, x 6.7 cm. 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 High-grade left renal artery stenosis. Right lower extremity: Moderate stenosis of the common femoral artery and  diffuse high-grade disease of SFA. Left lower extremity: Occlusion of SFA reconstitution at the popliteal artery level. Bilateral pleural effusions. Cholelithiasis. recommend vascular surgical consultation.     8/10:  CXR:  Interstitial prominence which may represent edema. Small patchy density partially obscures right hemidiaphragm. There are sternal wires.      ECHOCARDIOGRAM:  -  Left ventricle: Systolic function was normal. Ejection fraction was estimated in the range of 60 % to 65 %. There were no regional wall motion abnormalities. There was mild concentric hypertrophy. Left ventricular diastolic function parameters suggest diastolic dysfunction. -  Right ventricle: There was mild pulmonary artery hypertension. -  Left atrium: The atrium was moderately to markedly dilated. -  Right atrium: The atrium was moderately dilated. -  Aortic valve: The valve was trileaflet. Leaflets exhibited mild to   Moderate calcification and reduced mobility. There was moderate regurgitation. The aortic valve area by the continuity equation was 2.05 cm2. The mean pressure gradient was 18 mmHg. The findings were most consistent with mild aortic stenosis. -  Mitral valve: There was mild annular calcification. There was mild to moderate regurgitation. -  Tricuspid valve:  There was mild regurgitation    Assessment/Plan:   CAP  AAA, 6.1 cm  Altered mental status with acute metabolic [id number](4/4/3063)  Hypothyroidism  Acute UTI   Fever   Hypertension, poorly controlled  Persistent leukocytosis due to steroids     PLAN:  ID stopped Abx since patient completed full treatment for UTI and CAP  Leukocytosis from steriods  ID recommends Vancomycin AND Cefazolin pre-op  Patient to go for AAA repair on 8/16 or when scheduled  Cardiology giving final pre-op clearance    Care Plan discussed with:  Patient    Signed By: Guy Wellington MD     August 15, 2017

## 2017-08-15 NOTE — PROGRESS NOTES
Physical assessment completed, pt alert and cooperative with care, resting in bed, no visual s/s of pain or distress, pt on room air, Call light within reach.

## 2017-08-15 NOTE — PROGRESS NOTES
HCPOA completed and information placed on patient's chart. Pt is scheduled for surgery 8-15-17.         [employer]

## 2017-08-15 NOTE — PROGRESS NOTES
Resting in bed with eyes open alert and oriented times three. Resp even and unlabored. Speech clear, voices needs, denies discomfort, IV to right arm. RT at bedside. No distress noted, call light to left side, door open.

## 2017-08-15 NOTE — PROGRESS NOTES
Patient alert and oriented times three and stable; resting in bed with no distress noted. Report to be given to oncoming 7a-7p nurse. Remote telemetry sinus at 87 with occasional PVC's.

## 2017-08-15 NOTE — PROGRESS NOTES
Resting in bed receiving neb treatment given by RT. A&O times three with periods of confusion at times; redirects easily. All paperwork concerning POA has been signed and filed. Patient scheduled for AAA tomorrow at 0900. No distress noted. Call light to right side.

## 2017-08-15 NOTE — PROGRESS NOTES
[facility] CARDIOLOGY PROGRESS NOTE           8/15/2017 9:47 AM    Admit Date: 8/1/2017      Subjective:   He is resting comfortably and has no cardiac complaints. S/p CABG without angina or CHF. Mild AS, moderate AR on echo with normal EF. Plan for AAA repair soon. ROS:  Cardiovascular:  As noted above    Objective:      Vitals:    08/14/17 2358 08/15/17 0403 08/15/17 0406 08/15/17 0728   BP: 121/63 119/55  142/57   Pulse: 76 75  77   Resp: 16 16 17   Temp: 98.4 °F (36.9 °C) 98.4 °F (36.9 °C)  97.9 °F (36.6 °C)   SpO2: 98% 97% 97% 95%   Weight:       Height:           Physical Exam:  General-No Acute Distress  Neck- supple, no JVD  CV- regular rate and rhythm, mild AS murmur  Lung- clear bilaterally  Abd- soft, nontender, nondistended  Ext- no edema bilaterally. Skin- warm and dry    Data Review:   Recent Labs      08/15/17   0722  08/14/17   1105   08/13/17   0654   NA   --   143   --   145   K   --   3.8   --   3.9   MG  2.6*  2.4   < >  2.4   BUN   --   26*   --   26*   CREA   --   1.66*   --   1.73*   GLU   --   124*   --   109*   WBC   --   11.2*   --    --    HGB   --   11.9*   --    --    HCT   --   35.5*   --    --    PLT   --   377   --    --     < > = values in this interval not displayed. Assessment/Plan:     Principal Problem:    CAP (community acquired pneumonia) (8/1/2017)    Active Problems:    Altered mental status (8/1/2017)      UTI (urinary tract infection) (8/1/2017)      Fever (8/1/2017)      HCAP (healthcare-associated pneumonia) (8/11/2017)      CAD - s/p CABG - stable. Mild aortic stenosis. Moderate AR.            Parmjit Sneed MD  8/15/2017 9:47 AM [facility] in [place]

## 2017-08-16 ENCOUNTER — APPOINTMENT (OUTPATIENT)
Dept: INTERVENTIONAL RADIOLOGY/VASCULAR | Age: 82
DRG: 853 | End: 2017-08-16
Attending: SURGERY
Payer: MEDICARE

## 2017-08-16 ENCOUNTER — ANESTHESIA (OUTPATIENT)
Dept: SURGERY | Age: 82
DRG: 853 | End: 2017-08-16
Payer: MEDICARE

## 2017-08-16 LAB
ABO + RH BLD: NORMAL
ANION GAP BLD CALC-SCNC: 7 MMOL/L (ref 7–16)
BACTERIA SPEC CULT: NORMAL
BASOPHILS # BLD: 0 K/UL (ref 0–0.2)
BASOPHILS NFR BLD: 0 % (ref 0–2)
BLOOD GROUP ANTIBODIES SERPL: NORMAL
BUN SERPL-MCNC: 24 MG/DL (ref 8–23)
CALCIUM SERPL-MCNC: 8.3 MG/DL (ref 8.3–10.4)
CHLORIDE SERPL-SCNC: 110 MMOL/L (ref 98–107)
CO2 SERPL-SCNC: 26 MMOL/L (ref 21–32)
CREAT SERPL-MCNC: 1.37 MG/DL (ref 0.8–1.5)
DIFFERENTIAL METHOD BLD: ABNORMAL
EOSINOPHIL # BLD: 0.2 K/UL (ref 0–0.8)
EOSINOPHIL NFR BLD: 2 % (ref 0.5–7.8)
ERYTHROCYTE [DISTWIDTH] IN BLOOD BY AUTOMATED COUNT: 15.4 % (ref 11.9–14.6)
GLUCOSE SERPL-MCNC: 96 MG/DL (ref 65–100)
HCT VFR BLD AUTO: 35.3 % (ref 41.1–50.3)
HGB BLD-MCNC: 11.8 G/DL (ref 13.6–17.2)
IMM GRANULOCYTES # BLD: 0 K/UL (ref 0–0.5)
IMM GRANULOCYTES NFR BLD: 0.3 % (ref 0–5)
INR PPP: 1 (ref 0.9–1.2)
LYMPHOCYTES # BLD: 2.1 K/UL (ref 0.5–4.6)
LYMPHOCYTES NFR BLD: 22 % (ref 13–44)
MAGNESIUM SERPL-MCNC: 2.4 MG/DL (ref 1.8–2.4)
MCH RBC QN AUTO: 30.9 PG (ref 26.1–32.9)
MCHC RBC AUTO-ENTMCNC: 33.4 G/DL (ref 31.4–35)
MCV RBC AUTO: 92.4 FL (ref 79.6–97.8)
MONOCYTES # BLD: 0.7 K/UL (ref 0.1–1.3)
MONOCYTES NFR BLD: 7 % (ref 4–12)
NEUTS SEG # BLD: 6.8 K/UL (ref 1.7–8.2)
NEUTS SEG NFR BLD: 69 % (ref 43–78)
PLATELET # BLD AUTO: 345 K/UL (ref 150–450)
PMV BLD AUTO: 10.4 FL (ref 10.8–14.1)
POTASSIUM SERPL-SCNC: 3.9 MMOL/L (ref 3.5–5.1)
PROTHROMBIN TIME: 10.7 SEC (ref 9.6–12)
RBC # BLD AUTO: 3.82 M/UL (ref 4.23–5.67)
SERVICE CMNT-IMP: NORMAL
SODIUM SERPL-SCNC: 143 MMOL/L (ref 136–145)
SPECIMEN EXP DATE BLD: NORMAL
WBC # BLD AUTO: 9.9 K/UL (ref 4.3–11.1)

## 2017-08-16 PROCEDURE — 77010033678 HC OXYGEN DAILY

## 2017-08-16 PROCEDURE — 77030002986 HC SUT PROL J&J -A: Performed by: SURGERY

## 2017-08-16 PROCEDURE — 76060000039 HC ANESTHESIA 4 TO 4.5 HR: Performed by: SURGERY

## 2017-08-16 PROCEDURE — C1894 INTRO/SHEATH, NON-LASER: HCPCS | Performed by: SURGERY

## 2017-08-16 PROCEDURE — 77030008584 HC TOOL GDWRE DEV TERU -A: Performed by: SURGERY

## 2017-08-16 PROCEDURE — 74011250637 HC RX REV CODE- 250/637: Performed by: HOSPITALIST

## 2017-08-16 PROCEDURE — 74011250637 HC RX REV CODE- 250/637: Performed by: INTERNAL MEDICINE

## 2017-08-16 PROCEDURE — 74011250636 HC RX REV CODE- 250/636: Performed by: HOSPITALIST

## 2017-08-16 PROCEDURE — 74011250636 HC RX REV CODE- 250/636: Performed by: ANESTHESIOLOGY

## 2017-08-16 PROCEDURE — 74011000250 HC RX REV CODE- 250

## 2017-08-16 PROCEDURE — 94640 AIRWAY INHALATION TREATMENT: CPT

## 2017-08-16 PROCEDURE — 74011250636 HC RX REV CODE- 250/636: Performed by: SURGERY

## 2017-08-16 PROCEDURE — 047J3ZZ DILATION OF LEFT EXTERNAL ILIAC ARTERY, PERCUTANEOUS APPROACH: ICD-10-PCS | Performed by: SURGERY

## 2017-08-16 PROCEDURE — C1768 GRAFT, VASCULAR: HCPCS | Performed by: SURGERY

## 2017-08-16 PROCEDURE — 77030005515 HC CATH URETH FOL14 BARD -B: Performed by: SURGERY

## 2017-08-16 PROCEDURE — 36415 COLL VENOUS BLD VENIPUNCTURE: CPT | Performed by: INTERNAL MEDICINE

## 2017-08-16 PROCEDURE — 76000 FLUOROSCOPY <1 HR PHYS/QHP: CPT

## 2017-08-16 PROCEDURE — C1725 CATH, TRANSLUMIN NON-LASER: HCPCS | Performed by: SURGERY

## 2017-08-16 PROCEDURE — C1887 CATHETER, GUIDING: HCPCS | Performed by: SURGERY

## 2017-08-16 PROCEDURE — 047H3ZZ DILATION OF RIGHT EXTERNAL ILIAC ARTERY, PERCUTANEOUS APPROACH: ICD-10-PCS | Performed by: SURGERY

## 2017-08-16 PROCEDURE — 77030003602 HC NDL NRV BLK BBMI -B: Performed by: ANESTHESIOLOGY

## 2017-08-16 PROCEDURE — 77030002519 HC INSRT CLMP FIBRA STLTH AMR -B: Performed by: SURGERY

## 2017-08-16 PROCEDURE — 77030011640 HC PAD GRND REM COVD -A: Performed by: SURGERY

## 2017-08-16 PROCEDURE — 77030020268 HC MISC GENERAL SUPPLY: Performed by: SURGERY

## 2017-08-16 PROCEDURE — 77030034888 HC SUT PROL 2 J&J -B: Performed by: SURGERY

## 2017-08-16 PROCEDURE — 74011000250 HC RX REV CODE- 250: Performed by: NURSE PRACTITIONER

## 2017-08-16 PROCEDURE — 80048 BASIC METABOLIC PNL TOTAL CA: CPT | Performed by: INTERNAL MEDICINE

## 2017-08-16 PROCEDURE — 77030032490 HC SLV COMPR SCD KNE COVD -B

## 2017-08-16 PROCEDURE — 77030008467 HC STPLR SKN COVD -B: Performed by: SURGERY

## 2017-08-16 PROCEDURE — 77030020782 HC GWN BAIR PAWS FLX 3M -B: Performed by: ANESTHESIOLOGY

## 2017-08-16 PROCEDURE — 83735 ASSAY OF MAGNESIUM: CPT | Performed by: INTERNAL MEDICINE

## 2017-08-16 PROCEDURE — 77030031139 HC SUT VCRL2 J&J -A: Performed by: SURGERY

## 2017-08-16 PROCEDURE — 77030013058 HC DEV INFL ANGIO BSC -B: Performed by: SURGERY

## 2017-08-16 PROCEDURE — B31C1ZZ FLUOROSCOPY OF BILATERAL EXTERNAL CAROTID ARTERIES USING LOW OSMOLAR CONTRAST: ICD-10-PCS | Performed by: SURGERY

## 2017-08-16 PROCEDURE — 77030019605

## 2017-08-16 PROCEDURE — 77030034850

## 2017-08-16 PROCEDURE — 77030033147 HC GRFT ENDOVSC TRNK IPSLAT WLGO -K5: Performed by: SURGERY

## 2017-08-16 PROCEDURE — 77030005401 HC CATH RAD ARRO -A: Performed by: ANESTHESIOLOGY

## 2017-08-16 PROCEDURE — 77030021532 HC CATH ANGI DX IMPRS MRTM -B: Performed by: SURGERY

## 2017-08-16 PROCEDURE — 85025 COMPLETE CBC W/AUTO DIFF WBC: CPT | Performed by: INTERNAL MEDICINE

## 2017-08-16 PROCEDURE — C1769 GUIDE WIRE: HCPCS | Performed by: SURGERY

## 2017-08-16 PROCEDURE — 77030014008 HC SPNG HEMSTAT J&J -C: Performed by: SURGERY

## 2017-08-16 PROCEDURE — 77030008477 HC STYL SATN SLP COVD -A: Performed by: ANESTHESIOLOGY

## 2017-08-16 PROCEDURE — 77030013292 HC BOWL MX PRSM J&J -A: Performed by: ANESTHESIOLOGY

## 2017-08-16 PROCEDURE — 74011250636 HC RX REV CODE- 250/636

## 2017-08-16 PROCEDURE — 74011000250 HC RX REV CODE- 250: Performed by: SURGERY

## 2017-08-16 PROCEDURE — 77030008703 HC TU ET UNCUF COVD -A: Performed by: ANESTHESIOLOGY

## 2017-08-16 PROCEDURE — 76210000016 HC OR PH I REC 1 TO 1.5 HR: Performed by: SURGERY

## 2017-08-16 PROCEDURE — 77030020788: Performed by: SURGERY

## 2017-08-16 PROCEDURE — 04V03D6 RESTRICT ABD AORTA, BIFURC, W INTRALUM DEV, PERC: ICD-10-PCS | Performed by: SURGERY

## 2017-08-16 PROCEDURE — 77030002996 HC SUT SLK J&J -A: Performed by: SURGERY

## 2017-08-16 PROCEDURE — 74011250636 HC RX REV CODE- 250/636: Performed by: PHYSICIAN ASSISTANT

## 2017-08-16 PROCEDURE — 65610000001 HC ROOM ICU GENERAL

## 2017-08-16 PROCEDURE — 94760 N-INVAS EAR/PLS OXIMETRY 1: CPT

## 2017-08-16 PROCEDURE — 85610 PROTHROMBIN TIME: CPT | Performed by: INTERNAL MEDICINE

## 2017-08-16 PROCEDURE — 77030003629 HC NDL PERC VASC COOK -A: Performed by: SURGERY

## 2017-08-16 PROCEDURE — 74011636320 HC RX REV CODE- 636/320: Performed by: SURGERY

## 2017-08-16 PROCEDURE — 77030020407 HC IV BLD WRMR ST 3M -A: Performed by: ANESTHESIOLOGY

## 2017-08-16 PROCEDURE — 77030034103 HC GRFT ENDVAS EXT XCLDR WLGO -I3: Performed by: SURGERY

## 2017-08-16 PROCEDURE — 77030016570 HC BLNKT BAIR HGGR 3M -B: Performed by: SURGERY

## 2017-08-16 PROCEDURE — 86900 BLOOD TYPING SEROLOGIC ABO: CPT | Performed by: HOSPITALIST

## 2017-08-16 PROCEDURE — 77030010514 HC APPL CLP LIG COVD -B: Performed by: SURGERY

## 2017-08-16 PROCEDURE — 74011250637 HC RX REV CODE- 250/637: Performed by: ANESTHESIOLOGY

## 2017-08-16 PROCEDURE — 87641 MR-STAPH DNA AMP PROBE: CPT | Performed by: INTERNAL MEDICINE

## 2017-08-16 PROCEDURE — 77030019908 HC STETH ESOPH SIMS -A: Performed by: ANESTHESIOLOGY

## 2017-08-16 PROCEDURE — 76010000175 HC OR TIME 4 TO 4.5 HR INTENSV-TIER 1: Performed by: SURGERY

## 2017-08-16 PROCEDURE — 77030002987 HC SUT PROL J&J -B: Performed by: SURGERY

## 2017-08-16 DEVICE — IMPLANTABLE DEVICE: Type: IMPLANTABLE DEVICE | Site: AORTA | Status: FUNCTIONAL

## 2017-08-16 DEVICE — GRAFT ENDOPROS L12CM DST DIA12MM CONTRALATERAL LEG W/ C3: Type: IMPLANTABLE DEVICE | Site: LEG | Status: FUNCTIONAL

## 2017-08-16 DEVICE — IMPLANTABLE DEVICE: Type: IMPLANTABLE DEVICE | Site: LEG | Status: FUNCTIONAL

## 2017-08-16 RX ORDER — LABETALOL HYDROCHLORIDE 5 MG/ML
INJECTION, SOLUTION INTRAVENOUS AS NEEDED
Status: DISCONTINUED | OUTPATIENT
Start: 2017-08-16 | End: 2017-08-16 | Stop reason: HOSPADM

## 2017-08-16 RX ORDER — DIPHENHYDRAMINE HYDROCHLORIDE 50 MG/ML
12.5 INJECTION, SOLUTION INTRAMUSCULAR; INTRAVENOUS
Status: DISCONTINUED | OUTPATIENT
Start: 2017-08-16 | End: 2017-08-22 | Stop reason: HOSPADM

## 2017-08-16 RX ORDER — MORPHINE SULFATE 2 MG/ML
1 INJECTION, SOLUTION INTRAMUSCULAR; INTRAVENOUS
Status: DISCONTINUED | OUTPATIENT
Start: 2017-08-16 | End: 2017-08-22 | Stop reason: HOSPADM

## 2017-08-16 RX ORDER — PROTAMINE SULFATE 10 MG/ML
INJECTION, SOLUTION INTRAVENOUS AS NEEDED
Status: DISCONTINUED | OUTPATIENT
Start: 2017-08-16 | End: 2017-08-16 | Stop reason: HOSPADM

## 2017-08-16 RX ORDER — SODIUM CHLORIDE, SODIUM LACTATE, POTASSIUM CHLORIDE, CALCIUM CHLORIDE 600; 310; 30; 20 MG/100ML; MG/100ML; MG/100ML; MG/100ML
75 INJECTION, SOLUTION INTRAVENOUS CONTINUOUS
Status: DISCONTINUED | OUTPATIENT
Start: 2017-08-16 | End: 2017-08-16

## 2017-08-16 RX ORDER — LIDOCAINE HYDROCHLORIDE 20 MG/ML
INJECTION, SOLUTION EPIDURAL; INFILTRATION; INTRACAUDAL; PERINEURAL AS NEEDED
Status: DISCONTINUED | OUTPATIENT
Start: 2017-08-16 | End: 2017-08-16 | Stop reason: HOSPADM

## 2017-08-16 RX ORDER — OXYCODONE HYDROCHLORIDE 5 MG/1
10 TABLET ORAL
Status: DISCONTINUED | OUTPATIENT
Start: 2017-08-16 | End: 2017-08-16

## 2017-08-16 RX ORDER — ROCURONIUM BROMIDE 10 MG/ML
INJECTION, SOLUTION INTRAVENOUS AS NEEDED
Status: DISCONTINUED | OUTPATIENT
Start: 2017-08-16 | End: 2017-08-16 | Stop reason: HOSPADM

## 2017-08-16 RX ORDER — SODIUM CHLORIDE 9 MG/ML
INJECTION, SOLUTION INTRAVENOUS
Status: DISCONTINUED | OUTPATIENT
Start: 2017-08-16 | End: 2017-08-16 | Stop reason: HOSPADM

## 2017-08-16 RX ORDER — NEOSTIGMINE METHYLSULFATE 1 MG/ML
INJECTION INTRAVENOUS AS NEEDED
Status: DISCONTINUED | OUTPATIENT
Start: 2017-08-16 | End: 2017-08-16 | Stop reason: HOSPADM

## 2017-08-16 RX ORDER — SODIUM CHLORIDE 9 MG/ML
75 INJECTION, SOLUTION INTRAVENOUS CONTINUOUS
Status: ACTIVE | OUTPATIENT
Start: 2017-08-16 | End: 2017-08-16

## 2017-08-16 RX ORDER — ONDANSETRON 2 MG/ML
INJECTION INTRAMUSCULAR; INTRAVENOUS AS NEEDED
Status: DISCONTINUED | OUTPATIENT
Start: 2017-08-16 | End: 2017-08-16 | Stop reason: HOSPADM

## 2017-08-16 RX ORDER — GLYCOPYRROLATE 0.2 MG/ML
INJECTION INTRAMUSCULAR; INTRAVENOUS AS NEEDED
Status: DISCONTINUED | OUTPATIENT
Start: 2017-08-16 | End: 2017-08-16 | Stop reason: HOSPADM

## 2017-08-16 RX ORDER — OXYCODONE AND ACETAMINOPHEN 5; 325 MG/1; MG/1
1 TABLET ORAL
Status: DISCONTINUED | OUTPATIENT
Start: 2017-08-16 | End: 2017-08-22 | Stop reason: HOSPADM

## 2017-08-16 RX ORDER — LIDOCAINE HYDROCHLORIDE 10 MG/ML
0.1 INJECTION INFILTRATION; PERINEURAL AS NEEDED
Status: DISCONTINUED | OUTPATIENT
Start: 2017-08-16 | End: 2017-08-16 | Stop reason: HOSPADM

## 2017-08-16 RX ORDER — MIDAZOLAM HYDROCHLORIDE 1 MG/ML
2 INJECTION, SOLUTION INTRAMUSCULAR; INTRAVENOUS ONCE
Status: DISCONTINUED | OUTPATIENT
Start: 2017-08-16 | End: 2017-08-16 | Stop reason: HOSPADM

## 2017-08-16 RX ORDER — HYDROMORPHONE HYDROCHLORIDE 2 MG/ML
0.5 INJECTION, SOLUTION INTRAMUSCULAR; INTRAVENOUS; SUBCUTANEOUS
Status: DISCONTINUED | OUTPATIENT
Start: 2017-08-16 | End: 2017-08-16

## 2017-08-16 RX ORDER — FAMOTIDINE 20 MG/1
20 TABLET, FILM COATED ORAL ONCE
Status: COMPLETED | OUTPATIENT
Start: 2017-08-16 | End: 2017-08-16

## 2017-08-16 RX ORDER — OXYCODONE HYDROCHLORIDE 5 MG/1
5 TABLET ORAL
Status: DISCONTINUED | OUTPATIENT
Start: 2017-08-16 | End: 2017-08-16

## 2017-08-16 RX ORDER — HEPARIN SODIUM 200 [USP'U]/100ML
INJECTION, SOLUTION INTRAVENOUS
Status: DISCONTINUED | OUTPATIENT
Start: 2017-08-16 | End: 2017-08-16 | Stop reason: HOSPADM

## 2017-08-16 RX ORDER — FENTANYL CITRATE 50 UG/ML
100 INJECTION, SOLUTION INTRAMUSCULAR; INTRAVENOUS ONCE
Status: DISCONTINUED | OUTPATIENT
Start: 2017-08-16 | End: 2017-08-16 | Stop reason: HOSPADM

## 2017-08-16 RX ORDER — METOPROLOL TARTRATE 5 MG/5ML
5 INJECTION INTRAVENOUS EVERY 6 HOURS
Status: DISCONTINUED | OUTPATIENT
Start: 2017-08-16 | End: 2017-08-17

## 2017-08-16 RX ORDER — FENTANYL CITRATE 50 UG/ML
INJECTION, SOLUTION INTRAMUSCULAR; INTRAVENOUS AS NEEDED
Status: DISCONTINUED | OUTPATIENT
Start: 2017-08-16 | End: 2017-08-16 | Stop reason: HOSPADM

## 2017-08-16 RX ORDER — LIDOCAINE HYDROCHLORIDE 10 MG/ML
INJECTION INFILTRATION; PERINEURAL AS NEEDED
Status: DISCONTINUED | OUTPATIENT
Start: 2017-08-16 | End: 2017-08-16 | Stop reason: HOSPADM

## 2017-08-16 RX ORDER — DEXAMETHASONE SODIUM PHOSPHATE 4 MG/ML
INJECTION, SOLUTION INTRA-ARTICULAR; INTRALESIONAL; INTRAMUSCULAR; INTRAVENOUS; SOFT TISSUE AS NEEDED
Status: DISCONTINUED | OUTPATIENT
Start: 2017-08-16 | End: 2017-08-16 | Stop reason: HOSPADM

## 2017-08-16 RX ORDER — BUPIVACAINE HYDROCHLORIDE 2.5 MG/ML
INJECTION, SOLUTION EPIDURAL; INFILTRATION; INTRACAUDAL AS NEEDED
Status: DISCONTINUED | OUTPATIENT
Start: 2017-08-16 | End: 2017-08-16 | Stop reason: HOSPADM

## 2017-08-16 RX ORDER — PROPOFOL 10 MG/ML
INJECTION, EMULSION INTRAVENOUS AS NEEDED
Status: DISCONTINUED | OUTPATIENT
Start: 2017-08-16 | End: 2017-08-16 | Stop reason: HOSPADM

## 2017-08-16 RX ORDER — ONDANSETRON 2 MG/ML
4 INJECTION INTRAMUSCULAR; INTRAVENOUS
Status: DISCONTINUED | OUTPATIENT
Start: 2017-08-16 | End: 2017-08-22 | Stop reason: HOSPADM

## 2017-08-16 RX ORDER — HEPARIN SODIUM 5000 [USP'U]/ML
INJECTION, SOLUTION INTRAVENOUS; SUBCUTANEOUS AS NEEDED
Status: DISCONTINUED | OUTPATIENT
Start: 2017-08-16 | End: 2017-08-16 | Stop reason: HOSPADM

## 2017-08-16 RX ORDER — HEPARIN SODIUM 1000 [USP'U]/ML
INJECTION, SOLUTION INTRAVENOUS; SUBCUTANEOUS AS NEEDED
Status: DISCONTINUED | OUTPATIENT
Start: 2017-08-16 | End: 2017-08-16 | Stop reason: HOSPADM

## 2017-08-16 RX ORDER — SODIUM CHLORIDE, SODIUM LACTATE, POTASSIUM CHLORIDE, CALCIUM CHLORIDE 600; 310; 30; 20 MG/100ML; MG/100ML; MG/100ML; MG/100ML
75 INJECTION, SOLUTION INTRAVENOUS CONTINUOUS
Status: DISCONTINUED | OUTPATIENT
Start: 2017-08-16 | End: 2017-08-16 | Stop reason: HOSPADM

## 2017-08-16 RX ADMIN — SODIUM CHLORIDE: 9 INJECTION, SOLUTION INTRAVENOUS at 12:45

## 2017-08-16 RX ADMIN — FENTANYL CITRATE 50 MCG: 50 INJECTION, SOLUTION INTRAMUSCULAR; INTRAVENOUS at 11:05

## 2017-08-16 RX ADMIN — PROPOFOL 110 MG: 10 INJECTION, EMULSION INTRAVENOUS at 11:10

## 2017-08-16 RX ADMIN — SODIUM CHLORIDE: 9 INJECTION, SOLUTION INTRAVENOUS at 11:20

## 2017-08-16 RX ADMIN — GLYCOPYRROLATE 0.4 MG: 0.2 INJECTION INTRAMUSCULAR; INTRAVENOUS at 14:32

## 2017-08-16 RX ADMIN — ROCURONIUM BROMIDE 20 MG: 10 INJECTION, SOLUTION INTRAVENOUS at 12:03

## 2017-08-16 RX ADMIN — CEFAZOLIN 2 G: 1 INJECTION, POWDER, FOR SOLUTION INTRAMUSCULAR; INTRAVENOUS; PARENTERAL at 11:20

## 2017-08-16 RX ADMIN — BUDESONIDE 500 MCG: 0.5 SUSPENSION RESPIRATORY (INHALATION) at 08:04

## 2017-08-16 RX ADMIN — IPRATROPIUM BROMIDE AND ALBUTEROL SULFATE 3 ML: 2.5; .5 SOLUTION RESPIRATORY (INHALATION) at 23:18

## 2017-08-16 RX ADMIN — SODIUM CHLORIDE, SODIUM LACTATE, POTASSIUM CHLORIDE, AND CALCIUM CHLORIDE: 600; 310; 30; 20 INJECTION, SOLUTION INTRAVENOUS at 12:45

## 2017-08-16 RX ADMIN — LEVOTHYROXINE SODIUM 100 MCG: 100 TABLET ORAL at 05:31

## 2017-08-16 RX ADMIN — FAMOTIDINE 20 MG: 20 TABLET ORAL at 09:38

## 2017-08-16 RX ADMIN — HYDRALAZINE HYDROCHLORIDE 50 MG: 50 TABLET ORAL at 08:23

## 2017-08-16 RX ADMIN — ROCURONIUM BROMIDE 10 MG: 10 INJECTION, SOLUTION INTRAVENOUS at 13:01

## 2017-08-16 RX ADMIN — ONDANSETRON 4 MG: 2 INJECTION INTRAMUSCULAR; INTRAVENOUS at 13:51

## 2017-08-16 RX ADMIN — Medication 10 ML: at 23:24

## 2017-08-16 RX ADMIN — FENTANYL CITRATE 25 MCG: 50 INJECTION, SOLUTION INTRAMUSCULAR; INTRAVENOUS at 13:56

## 2017-08-16 RX ADMIN — Medication 5 ML: at 05:31

## 2017-08-16 RX ADMIN — FINASTERIDE 5 MG: 5 TABLET, FILM COATED ORAL at 08:23

## 2017-08-16 RX ADMIN — SODIUM CHLORIDE 75 ML/HR: 900 INJECTION, SOLUTION INTRAVENOUS at 16:41

## 2017-08-16 RX ADMIN — FENTANYL CITRATE 50 MCG: 50 INJECTION, SOLUTION INTRAMUSCULAR; INTRAVENOUS at 11:46

## 2017-08-16 RX ADMIN — IPRATROPIUM BROMIDE AND ALBUTEROL SULFATE 3 ML: 2.5; .5 SOLUTION RESPIRATORY (INHALATION) at 20:13

## 2017-08-16 RX ADMIN — HEPARIN SODIUM 2500 UNITS: 1000 INJECTION, SOLUTION INTRAVENOUS; SUBCUTANEOUS at 12:55

## 2017-08-16 RX ADMIN — ONDANSETRON 4 MG: 2 INJECTION INTRAMUSCULAR; INTRAVENOUS at 19:40

## 2017-08-16 RX ADMIN — BUDESONIDE 500 MCG: 0.5 SUSPENSION RESPIRATORY (INHALATION) at 20:15

## 2017-08-16 RX ADMIN — IPRATROPIUM BROMIDE AND ALBUTEROL SULFATE 3 ML: 2.5; .5 SOLUTION RESPIRATORY (INHALATION) at 08:03

## 2017-08-16 RX ADMIN — METOPROLOL TARTRATE 5 MG: 5 INJECTION INTRAVENOUS at 23:23

## 2017-08-16 RX ADMIN — ASPIRIN 81 MG: 81 TABLET, COATED ORAL at 09:34

## 2017-08-16 RX ADMIN — METOPROLOL TARTRATE 25 MG: 25 TABLET ORAL at 09:35

## 2017-08-16 RX ADMIN — METOPROLOL TARTRATE 5 MG: 5 INJECTION INTRAVENOUS at 16:58

## 2017-08-16 RX ADMIN — SODIUM CHLORIDE, SODIUM LACTATE, POTASSIUM CHLORIDE, AND CALCIUM CHLORIDE: 600; 310; 30; 20 INJECTION, SOLUTION INTRAVENOUS at 10:50

## 2017-08-16 RX ADMIN — BACLOFEN 10 MG: 10 TABLET ORAL at 08:24

## 2017-08-16 RX ADMIN — Medication 10 ML: at 16:41

## 2017-08-16 RX ADMIN — LABETALOL HYDROCHLORIDE 5 MG: 5 INJECTION, SOLUTION INTRAVENOUS at 14:57

## 2017-08-16 RX ADMIN — HYDROMORPHONE HYDROCHLORIDE 0.5 MG: 2 INJECTION, SOLUTION INTRAMUSCULAR; INTRAVENOUS; SUBCUTANEOUS at 15:24

## 2017-08-16 RX ADMIN — HEPARIN SODIUM 5000 UNITS: 1000 INJECTION, SOLUTION INTRAVENOUS; SUBCUTANEOUS at 12:10

## 2017-08-16 RX ADMIN — LIDOCAINE HYDROCHLORIDE 100 MG: 20 INJECTION, SOLUTION EPIDURAL; INFILTRATION; INTRACAUDAL; PERINEURAL at 11:10

## 2017-08-16 RX ADMIN — DEXAMETHASONE SODIUM PHOSPHATE 4 MG: 4 INJECTION, SOLUTION INTRA-ARTICULAR; INTRALESIONAL; INTRAMUSCULAR; INTRAVENOUS; SOFT TISSUE at 11:25

## 2017-08-16 RX ADMIN — IPRATROPIUM BROMIDE AND ALBUTEROL SULFATE 3 ML: 2.5; .5 SOLUTION RESPIRATORY (INHALATION) at 00:00

## 2017-08-16 RX ADMIN — HYDROMORPHONE HYDROCHLORIDE 0.5 MG: 2 INJECTION, SOLUTION INTRAMUSCULAR; INTRAVENOUS; SUBCUTANEOUS at 15:53

## 2017-08-16 RX ADMIN — PANTOPRAZOLE SODIUM 40 MG: 40 TABLET, DELAYED RELEASE ORAL at 05:31

## 2017-08-16 RX ADMIN — ROCURONIUM BROMIDE 50 MG: 10 INJECTION, SOLUTION INTRAVENOUS at 11:11

## 2017-08-16 RX ADMIN — PROTAMINE SULFATE 40 MG: 10 INJECTION, SOLUTION INTRAVENOUS at 14:12

## 2017-08-16 RX ADMIN — NEOSTIGMINE METHYLSULFATE 3 MG: 1 INJECTION INTRAVENOUS at 14:32

## 2017-08-16 RX ADMIN — FERROUS SULFATE TAB 325 MG (65 MG ELEMENTAL FE) 325 MG: 325 (65 FE) TAB at 05:31

## 2017-08-16 NOTE — PERIOP NOTES
TRANSFER - OUT REPORT:    Verbal report given to Femi Platt (name) on Sherron Seals  being transferred to [facility](unit) for routine post - op       Report consisted of patients Situation, Background, Assessment and   Recommendations(SBAR). Information from the following report(s) SBAR, OR Summary, Intake/Output and MAR was reviewed with the receiving nurse. Lines:   Peripheral IV 08/11/17 Right Forearm (Active)   Site Assessment Clean, dry, & intact 8/16/2017  3:10 PM   Phlebitis Assessment 0 8/16/2017  3:10 PM   Infiltration Assessment 0 8/16/2017  3:10 PM   Dressing Status Clean, dry, & intact 8/16/2017  3:10 PM   Dressing Type Transparent 8/16/2017  3:10 PM   Hub Color/Line Status Capped 8/16/2017  3:10 PM   Alcohol Cap Used No 8/15/2017  7:20 PM       Peripheral IV 08/16/17 Right Wrist (Active)   Site Assessment Clean, dry, & intact 8/16/2017  3:10 PM   Phlebitis Assessment 0 8/16/2017  3:10 PM   Infiltration Assessment 0 8/16/2017  3:10 PM   Dressing Status Clean, dry, & intact 8/16/2017  3:10 PM   Dressing Type Transparent 8/16/2017  3:10 PM   Hub Color/Line Status Infusing 8/16/2017  3:10 PM       Peripheral IV 08/16/17 Left Forearm (Active)   Site Assessment Clean, dry, & intact 8/16/2017  3:10 PM   Phlebitis Assessment 0 8/16/2017  3:10 PM   Infiltration Assessment 0 8/16/2017  3:10 PM   Dressing Status Clean, dry, & intact 8/16/2017  3:10 PM   Dressing Type Transparent 8/16/2017  3:10 PM   Hub Color/Line Status Capped 8/16/2017  3:10 PM       Arterial Line 08/16/17 Right Radial artery (Active)   Site Assessment Clean, dry, & intact 8/16/2017  3:10 PM   Dressing Status Clean, dry, & intact 8/16/2017  3:10 PM   Dressing Type Transparent 8/16/2017  3:10 PM   Line Status Intact and in place 8/16/2017  3:10 PM   Treatment Zeroed or re-zeroed 8/16/2017  3:10 PM   Affected Extremity/Extremities Color distal to insertion site pink (or appropriate for race); Pulses palpable 8/16/2017  3:10 PM Opportunity for questions and clarification was provided. Patient transported with:   Monitor  O2 @ 2 liters  Registered Nurse    VTE prophylaxis orders have been written for [employer]. Patient given floor number and nurses name.   Per Betsey Oneill in the waiting room, family has gone home and will return tomorrow am.

## 2017-08-16 NOTE — PROGRESS NOTES
TRANSFER - OUT REPORT:    Verbal report given to [facility] AT LASHONDA ARMANDO, YOLANDA on Jeanette Amanda  being transferred to Preop for routine progression of care       Report consisted of patients Situation, Background, Assessment and   Recommendations(SBAR). Information from the following report(s) SBAR was reviewed with the receiving nurse. Lines:   Peripheral IV 08/11/17 Right Forearm (Active)   Site Assessment Clean, dry, & intact 8/16/2017  7:42 AM   Phlebitis Assessment 0 8/16/2017  7:42 AM   Infiltration Assessment 0 8/16/2017  7:42 AM   Dressing Status Clean, dry, & intact 8/16/2017  7:42 AM   Dressing Type Tape;Transparent 8/16/2017  7:42 AM   Hub Color/Line Status Patent 8/16/2017  7:42 AM   Alcohol Cap Used No 8/15/2017  7:20 PM        Opportunity for questions and clarification was provided.

## 2017-08-16 NOTE — PERIOP NOTES
TRANSFER - IN REPORT:    Verbal report received from [facility], RN on Donald Caul  being received from 8th floor for ordered procedure      Report consisted of patients Situation, Background, Assessment and   Recommendations(SBAR). Information from the following report(s) SBAR, OR Summary, Procedure Summary, Intake/Output, MAR and Recent Results was reviewed with the receiving nurse. Opportunity for questions and clarification was provided. Assessment completed upon patients arrival to unit and care assumed.

## 2017-08-16 NOTE — PROGRESS NOTES
[facility] CARDIOLOGY PROGRESS NOTE           8/16/2017 8:39 AM    Admit Date: 8/1/2017      Subjective:   Pt is alert feels OK No fever    ROS:  Cardiovascular:  As noted above    Objective:      Vitals:    08/15/17 2358 08/16/17 0405 08/16/17 0728 08/16/17 0807   BP:  125/48 119/65    Pulse:  72 64    Resp:  18 18    Temp:  98.2 °F (36.8 °C) 98.7 °F (37.1 °C)    SpO2: 96% 97% 95% 97%   Weight:       Height:           Physical Exam:  General-No Acute Distress  Neck- supple, no JVD  CV- regular rate and rhythm 2/6 keisha  Lung- clear bilaterally  Abd- soft, nontender, nondistended  Ext- no edema bilaterally.   Skin- warm and dry    Data Review:   Recent Labs      08/16/17   0624  08/15/17   0722  08/14/17   1105   NA  143   --   143   K  3.9   --   3.8   MG  2.4  2.6*  2.4   BUN  24*   --   26*   CREA  1.37   --   1.66*   GLU  96   --   124*   WBC  9.9   --   11.2*   HGB  11.8*   --   11.9*   HCT  35.3*   --   35.5*   PLT  345   --   377   INR  1.0   --    --        Assessment/Plan:     Principal Problem:    CAP (community acquired pneumonia) (8/1/2017)improved ID sees no contraindication for AVAR    Active Problems:    Altered mental status (8/1/2017)better      UTI (urinary tract infection) (8/1/2017)      Fever (8/1/2017)      HCAP (healthcare-associated pneumonia) (8/11/2017)       AAA plan surgery    Eileen Lyman MD  8/16/2017 8:39 AM

## 2017-08-16 NOTE — PROGRESS NOTES
Bedside and Verbal shift change report given to YOLANDA Chowdary (oncoming nurse) by Fredis Oliver RN (offgoing nurse). Report included the following information SBAR, Kardex, Procedure Summary, Intake/Output, MAR, Recent Results and Cardiac Rhythm NSR with PAC/PVC. Patient is drowsy, opens eyes to voice. Oriented x3, follows commands, slightly delayed responses, does have Urdu accent. Bilateral groin sites intact, no hematomas, scant shadowing to left groin dressing, bilateral pedal and post-tibial pulses palpable, feet and hands cool. NSR 60s on monitor with occasional PACs and PVCs, /69, O2 sat 97% on 2L NC, lung sounds clear, bowel sounds active. Lopez draining. Family called and states they left dentures at home but will bring them tomorrow for patient. Will continue to monitor.

## 2017-08-16 NOTE — BRIEF OP NOTE
BRIEF OPERATIVE NOTE    Date of Procedure: 8/16/2017   Preoperative Diagnosis: Abdominal aortic aneurysm (AAA) without rupture (HCC) [I71.4]  Postoperative Diagnosis: Abdominal aortic aneurysm (AAA) without rupture (HCC) [I71.4]    Procedure(s):  AORTIC ABDOMINAL ANEURYSM REPAIR ENDOVASCULAR (EVAR) WITH  BILATERAL FEMORAL CUT-DOWNS   Surgeon(s) and Role:     * Patti Craig MD - Primary         Assistant Staff:       Surgical Staff:  Circ-1: Semaj Williamson RN  Circ-Relief: Mike Steele  Radiology Technician: Esequiel Cueva, RT, R, CT; Bridger Savage, RT, R, CT  Scrub Tech-1: Darien Hanks  Scrub Tech-2: Ben Bailey  Scrub Tech-Relief: Bebeto Stout CNA  Event Time In   Incision Start 1151   Incision Close 1444     Anesthesia: General   Estimated Blood Loss: 200CC  Specimens: * No specimens in log *   Findings: CHRONIC HORTENCIA. HYPOGASTRIC ARTERY OCCLUSION   Complications: NONE  Implants:   Implant Name Type Inv. Item Serial No.  Lot No. LRB No. Used Action   GRAFT ENDV C3 [id number] -- AAA EXCLUDER - [id number]  GRAFT ENDV C3 [id number]. [id number] -- AAA EXCLUDER [id number] [employer]  N/A 1 Implanted   GRAFT ENDV EXCLUDER [id number] --  - [id number]  GRAFT ENDV EXCLUDER [id number] --  [id number] [employer]  Left 1 Implanted   GRAFT EXTDR ILIAC [id number] -- EXCLUDER - [id number]   GRAFT EXTDR ILIAC [id number] -- [facility] [id number] [employer]   Left 1 Implanted

## 2017-08-16 NOTE — OP NOTES
[facility] 65   OPERATIVE REPORT       Name:  Anderson Srinivasan   MR#:  [id number]   :  10/18/1931   Account #:  [de-identified]   Date of Adm:  2017       DATE OF PROCEDURE: 2017    PREOPERATIVE DIAGNOSIS: Abdominal aortic aneurysm. POSTOPERATIVE DIAGNOSIS: Abdominal aortic aneurysm. PROCEDURE   1. Endovascular abdominal aortic aneurysm repair ([employer] [place]   Excluder). 2. Bilateral external iliac artery percutaneous transluminal   angioplasty. 3. Bilateral femoral cutdown. SURGEON: Padmini Nieto MD    ASSISTANT: Micheal Almendarez. ANESTHESIA: General endotracheal.    ESTIMATED BLOOD LOSS: 200 mL. DRAINS: None. SPECIMENS: None. COMPLICATIONS: None. INDICATIONS: An 58-year-old gentleman who was found to have a   6.5 cm infrarenal abdominal aortic aneurysm on recent abdominal   CT that was performed to evaluate for possible intraabdominal   infection. He was felt to be a reasonable candidate for   endovascular repair based upon his aortic anatomy. DESCRIPTION OF PROCEDURE: The patient was brought to the   operating room and placed on the operating table in supine   position. Following adequate general endotracheal anesthesia,   bilateral femoral cutdowns were made. The common femoral,   profunda and SFAs were identified, mobilized and encircled with   vessel loops bilaterally. The patient was then systemically   heparinized. Next, a single wall puncture needle was then used   to cannulate the common femoral artery on the right. An 0.035   guidewire was advanced in the aorta. A 6-Yoruba sheath was   placed. A retrograde iliac arteriogram was performed,   identifying the areas of high-grade stenosis in the proximal   aspect of the external iliac artery. Next, an 8 x 40 balloon was   used to dilate the area of proximal external iliac artery   stenosis and then also inflated in the common iliac artery. Completion imaging demonstrates significant improvement.  Next, the 18-[place] sheath was advanced over the Amplatz wire;   however, there was significant resistance in advancing the   sheath and was felt to be unsafe. At this point, a 15 SoloPath   sheath was advanced from the right to the distal abdominal aorta   and then inflated to a 19-[place] diameter. Next, from the left,   the common femoral was then punctured, and an 0.035 guidewire   was advanced in the aorta, followed by a K and P catheter. An   Amplatz Super Stiff wire was then advanced through the K and P   catheter. From the right, a 6 x 40 balloon was used to dilate an   area of proximal external iliac artery stenosis as well. Next, a   12-[place] sheath was advanced from the left with no difficulty. Next, a 5-[place] pigtail catheter was advanced from the left,   and abdominal aorta was performed identifying the area of origin   of the renal arteries. From the right side through the SoloPath   sheath, a 31 x 14 x 17 [place] Excluder main body was advanced and   deployed just below the level of the right renal artery. Completion imaging demonstrated the graft in excellent position. The renal arteries remained patent. Next, the gate was   cannulated from the left, and a pigtail catheter was advanced   into the main body device and rotated to ensure that we were in   fact within the lumen of the main body device. Next, the Amplatz   wire was advanced. From the left a 12 x 12 contralateral limb was   then deployed, followed by a 10 x 7 iliac extension into the   external iliac artery to cover the area of previous high-grade   stenosis. Next, the SoloPath sheath was withdrawn below the   level of the ipsilateral limb, and the remainder of the main   body device was deployed. The graft was then ballooned at all   landing and junction sites from both sides. Completion imaging   demonstrated a complete exclusion of the aneurysm with good flow   within the endograft.  There remained some residual stenosis at   the distal portion of the contralateral limb. This was ballooned   with a 6 x 40 balloon. The 13 SoloPath was then reconstrained   and then removed. A 10-Malawian sheath was advanced from this   side, and a retrograde arteriogram was performed from the right   to ensure there was no external iliac artery injury. At this   point, the guidewires and sheaths were removed, and the puncture   sites were closed with interrupted 5-0 Prolene sutures. Prior to   completion of closures, the native vessels were backbled and   flushed. Closure was then completed, and flow was restored. There was Doppler flow present at the level of the feet   consistent with preoperative exam. Protamine was administered,   and hemostasis was achieved. The wound was then closed in layers   of Vicryl suture. Sterile, dry dressings were applied. The   patient was then awakened from anesthesia and transferred to the   recovery room in stable condition. The patient tolerated the   procedure well. No complications. All needle and sponge counts   were correct.         MD Dona Boone / Mario Lopez   D:  08/16/2017   15:19   T:  08/16/2017   15:48   Job #:  [id number]

## 2017-08-16 NOTE — ANESTHESIA PREPROCEDURE EVALUATION
Anesthetic History   No history of anesthetic complications            Review of Systems / Medical History  Patient summary reviewed and pertinent labs reviewed    Pulmonary    COPD: moderate      Pneumonia         Neuro/Psych   Within defined limits           Cardiovascular      Valvular problems/murmurs: mitral insufficiency        Past MI, CAD and cardiac stents    Exercise tolerance: >4 METS  Comments: SR with PACs    CXR--stable cardiomegaly    Moderate AI and MR, nl EF   GI/Hepatic/Renal  Within defined limits              Endo/Other  Within defined limits           Other Findings            Physical Exam    Airway  Mallampati: II  TM Distance: > 6 cm  Neck ROM: normal range of motion   Mouth opening: Normal     Cardiovascular    Rhythm: irregular  Rate: normal         Dental    Dentition: Poor dentition and Full upper dentures     Pulmonary    Rhonchi:bibasilar             Abdominal  GI exam deferred       Other Findings            Anesthetic Plan    ASA: 4  Anesthesia type: general    Monitoring Plan: Arterial line      Induction: Intravenous  Anesthetic plan and risks discussed with: Patient

## 2017-08-16 NOTE — PROGRESS NOTES
TRANSFER - IN REPORT:    Verbal report received from Bao Leonardo RN on Todd Del  being received from PACU for routine post - op      Report consisted of patients Situation, Background, Assessment and   Recommendations(SBAR). Information from the following report(s) SBAR, Kardex, OR Summary, Intake/Output, MAR, Recent Results, Med Rec Status and Cardiac Rhythm NSR with PVC/PAC was reviewed with the receiving nurse. Opportunity for questions and clarification was provided. Assessment completed upon patients arrival to unit and care assumed.

## 2017-08-16 NOTE — PROGRESS NOTES
Hospitalist Progress Note    Subjective:   Daily Progress Note: 8/16/2017 11:23 AM    History:  History:  Patient with history of hypertension, CKD, dementia,  Hypothyroidism sent in transfer from [address] [place] [address] 8/1/17 for direct admission for UTI, fever, worsening renal failure, hypoxemia and URI/pneumonia. Poor historian, lives with family who are not present on admission. GHS workup included creatinine of 1.8 up from baseline of 1.3. WBC:  13.1, lactic acid 1.5, BNP: 102, temp 100.7, oxygen sat 90% on room air with PaO2 of 69. CXR reported as normal.  Administered rocephin, zithromax and fluids there. Only complaints were malaise, weakness, cough and congestion. He was admitted on rocephin and zithromax, hold ACE and HCTZ, bedrest, IVF, nebs. Seen by OT, PT, SW.  No cultures done at [address] [place] [address], however old records indicate enterococcus UTI at [facility] on 7/31/  Requiring 3 liters of oxygen to keep oxygen sat in the 90's. No PCP. History of CAD with CABG. Continued fever on 8/3. Seen by SLP.      8/5:  Ultrasound of retroperitoneum ordered to evaluate possible mechanical obstruction resulting in acute renal insufficiency. Low dose beta blocker and statin added. Accidentally found a 4.1 cm AAA, follow up CTA as below. Also found a high grade left renal artery stenosis. No prior history. Remote CABG. Vascular surgery consulted with pending surgical repair.       8/6:  Cardiology consult for surgical clear. On statin, ASA, beta blocker. EF normal on echo. Only relative patient has is stepson with matt relationship. Arrangements made with care management and risk management. ID consult for enterococcus UTI with recommendations. Rocephin changed to IV ampicillin until 8/16 prior to surgery. EVAR planned for 8/16.   Also recommend operative prophylaxis at the time of surgery by adding vancomycin to routine cefazolin.       8/12:  Antibiotics changed to vancomycin and zosyn due to persistently elevated WBC.       8/15: Abx were stopped by ID since patient completed full Abx treatment. Leukocytosis caused by steroids. ID recommends pre-op Vanc and Cefazolin. Cardiology to give final pre-op clearance.     8/16: Patient had AAA surgical repair    Current Facility-Administered Medications   Medication Dose Route Frequency    lactated Ringers infusion  75 mL/hr IntraVENous CONTINUOUS    oxyCODONE IR (ROXICODONE) tablet 5 mg  5 mg Oral ONCE PRN    oxyCODONE IR (ROXICODONE) tablet 10 mg  10 mg Oral ONCE PRN    HYDROmorphone (PF) (DILAUDID) injection 0.5 mg  0.5 mg IntraVENous Multiple    promethazine (PHENERGAN) with saline injection 3.25 mg  3.25 mg IntraVENous Q15MIN PRN    0.9% sodium chloride infusion  75 mL/hr IntraVENous CONTINUOUS    oxyCODONE-acetaminophen (PERCOCET) 5-325 mg per tablet 1 Tab  1 Tab Oral Q4H PRN    morphine injection 1 mg  1 mg IntraVENous Q1H PRN    ondansetron (ZOFRAN) injection 4 mg  4 mg IntraVENous Q6H PRN    diphenhydrAMINE (BENADRYL) injection 12.5 mg  12.5 mg IntraVENous Q4H PRN    [START ON 8/17/2017] enoxaparin (LOVENOX) injection 30 mg  30 mg SubCUTAneous Q24H    hydrALAZINE (APRESOLINE) tablet 50 mg  50 mg Oral TID    metoprolol tartrate (LOPRESSOR) tablet 25 mg  25 mg Oral Q12H    atorvastatin (LIPITOR) tablet 40 mg  40 mg Oral QHS    albuterol-ipratropium (DUO-NEB) 2.5 MG-0.5 MG/3 ML  3 mL Nebulization Q4H RT    budesonide (PULMICORT) 500 mcg/2 ml nebulizer suspension  500 mcg Nebulization BID RT    sodium chloride (NS) flush 5-10 mL  5-10 mL IntraVENous Q8H    sodium chloride (NS) flush 5-10 mL  5-10 mL IntraVENous PRN    acetaminophen (TYLENOL) tablet 650 mg  650 mg Oral Q4H PRN    ondansetron (ZOFRAN) injection 4 mg  4 mg IntraVENous Q4H PRN    aspirin delayed-release tablet 81 mg  81 mg Oral DAILY    baclofen (LIORESAL) tablet 10 mg  10 mg Oral BID    docusate sodium (COLACE) capsule 100 mg  100 mg Oral BID    ferrous sulfate tablet 325 mg  325 mg Oral ACB    finasteride (PROSCAR) tablet 5 mg  5 mg Oral DAILY    levothyroxine (SYNTHROID) tablet 100 mcg  100 mcg Oral ACB    nicotine (NICODERM CQ) 7 mg/24 hr patch 1 Patch  1 Patch TransDERmal Q24H    pantoprazole (PROTONIX) tablet 40 mg  40 mg Oral ACB        Review of Systems  A comprehensive review of systems was negative except for that written in the HPI. Objective:     Visit Vitals    /70    Pulse 70    Temp 97.8 °F (36.6 °C)    Resp 16    Ht 5' 4\" (1.626 m)    Wt 67 kg (147 lb 9.6 oz)    SpO2 96%    BMI 25.34 kg/m2    O2 Flow Rate (L/min): 3 l/min O2 Device: Nasal cannula    Temp (24hrs), Av.5 °F (36.9 °C), Min:97.8 °F (36.6 °C), Max:99.3 °F (37.4 °C)     07 -  1900  In: 3100 [I.V.:3100]  Out: 1050 [Urine:700]  1901 -  0700  In: 840 [P.O.:840]  Out: 2 [Urine:2]    General appearance: Oriented and alert, cooperative, denies pain. Up in chair. No dysuria. Eating and drinking. Head: Normocephalic, without obvious abnormality, atraumatic  Eyes: conjunctivae/corneas clear. PERRL  Throat: Lips, mucosa, and tongue normal. Teeth and gums normal  Neck: supple, symmetrical, trachea midline, no adenopathy, no JVD  Lungs: clear to auscultation bilaterally, no cough, wheezes, rales, rhonchi. Heart: regular rate and rhythm, S1, S2 normal, no murmur, click, rub or gallop  Abdomen: soft, non-tender. Bowel sounds normal. No masses,  no organomegaly  Extremities: extremities normal, atraumatic, no cyanosis or edema  Skin: Skin color, texture, turgor normal. No rashes or lesions  Neurologic: Grossly normal   Additional comments:  Notes, orders, test results, vitals reviewed. Post op B/L groin wound/laproscopic sites covered.  Patient still slowly waking up from surgery    Data Review  Recent Results (from the past 24 hour(s))   MAGNESIUM    Collection Time: 17  6:24 AM   Result Value Ref Range    Magnesium 2.4 1.8 - 2.4 mg/dL   METABOLIC PANEL, BASIC    Collection Time: 17 6:24 AM   Result Value Ref Range    Sodium 143 136 - 145 mmol/L    Potassium 3.9 3.5 - 5.1 mmol/L    Chloride 110 (H) 98 - 107 mmol/L    CO2 26 21 - 32 mmol/L    Anion gap 7 7 - 16 mmol/L    Glucose 96 65 - 100 mg/dL    BUN 24 (H) 8 - 23 MG/DL    Creatinine 1.37 0.8 - 1.5 MG/DL    GFR est AA >60 >60 ml/min/1.73m2    GFR est non-AA 52 (L) >60 ml/min/1.73m2    Calcium 8.3 8.3 - 10.4 MG/DL   CBC WITH AUTOMATED DIFF    Collection Time: 08/16/17  6:24 AM   Result Value Ref Range    WBC 9.9 4.3 - 11.1 K/uL    RBC 3.82 (L) 4.23 - 5.67 M/uL    HGB 11.8 (L) 13.6 - 17.2 g/dL    HCT 35.3 (L) 41.1 - 50.3 %    MCV 92.4 79.6 - 97.8 FL    MCH 30.9 26.1 - 32.9 PG    MCHC 33.4 31.4 - 35.0 g/dL    RDW 15.4 (H) 11.9 - 14.6 %    PLATELET 915 004 - 490 K/uL    MPV 10.4 (L) 10.8 - 14.1 FL    DF AUTOMATED      NEUTROPHILS 69 43 - 78 %    LYMPHOCYTES 22 13 - 44 %    MONOCYTES 7 4.0 - 12.0 %    EOSINOPHILS 2 0.5 - 7.8 %    BASOPHILS 0 0.0 - 2.0 %    IMMATURE GRANULOCYTES 0.3 0.0 - 5.0 %    ABS. NEUTROPHILS 6.8 1.7 - 8.2 K/UL    ABS. LYMPHOCYTES 2.1 0.5 - 4.6 K/UL    ABS. MONOCYTES 0.7 0.1 - 1.3 K/UL    ABS. EOSINOPHILS 0.2 0.0 - 0.8 K/UL    ABS. BASOPHILS 0.0 0.0 - 0.2 K/UL    ABS. IMM. GRANS. 0.0 0.0 - 0.5 K/UL   PROTHROMBIN TIME + INR    Collection Time: 08/16/17  6:24 AM   Result Value Ref Range    Prothrombin time 10.7 9.6 - 12.0 sec    INR 1.0 0.9 - 1.2     TYPE & SCREEN    Collection Time: 08/16/17  6:24 AM   Result Value Ref Range    Crossmatch Expiration 08/19/2017     ABO/Rh(D) [place] POSITIVE     Antibody screen NEG      8/3:  ULTRASOUND RETROPERITONEUM:  A 4.1 cm abdominal aortic aneurysm with peripheral thrombus. Negative for hydronephrosis. Abnormally increased renal echogenicity. Simple renal cysts.      8/4:  CXR:  Stable post surgical changes overlie the mediastinal silhouette. A left shoulder replacement is once again seen. Stable mild cardiomegaly is seen. Lungs are expanded without pneumothorax.  No evolving consolidation, or pleural effusion is seen. Only stable likely chronic interstitial prominence is seen most evident at the lung bases. IMPRESSION:   Stable cardiomegaly without evolving acute changes seen.     8/4:  CTA ABDOMEN WITH RUNOFF:  The abdominal aorta is patent. Below the takeoff of the renal arteries there is enlargement of the abdominal aorta with a somewhat saccular component to the right of the main flow lumen. The aneurysm measures 4.0 x 6.7 cm the aorta is of relatively normal size and contour at the level of the iliac arteries. The SMA and celiac axis are widely patent. There is a moderate stenosis of the proximal right renal artery. There is a high-grade stenosis of the left renal artery. It is patent but is high-grade. The iliac arteries are patent. There is a moderate stenosis of the distal common iliac artery. The right lower extremity shows bulky plaque at the common femoral artery. There is a moderate stenosis at this level. The SFA shows diffuse disc disease with areas of moderate and high-grade stenoses. The popliteal artery is patent. There is relatively good 2 vessel runoff to the level of the ankle via the peroneal  and anterior tibial arteries. The posterior tibial artery appears largely occluded. Michelle graph the left lower extremity mild stenosis of the common femoral artery. The SFA is occluded proximally. The profunda femoris artery is patent. The popliteal artery reconstitutes. There is relatively good 2 vessel runoff to the level of the ankle via the peroneal artery and anterior tibial artery. Upon review of the source images the lungs show chronic appearing changes in the bases. In addition there are bilateral pleural effusions right being larger than the left. The liver shows no gross masses. The gallbladder is relatively small with a single appearing radiopaque stone measuring approximately 9 mm in  diameter. The spleen and pancreas show no abnormal masses.  The adrenal glands and kidneys show no abnormal masses. There are simple cyst of the left kidney measuring 2.5 cm in diameter. The bowel is unopacified as per CTA protocol. The bones are generally osteopenic. The spine shows mild degenerative change. IMPRESSION:  Infrarenal abdominal aortic aneurysm with saccular component measuring 4.0, x 6.7 cm. 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 High-grade left renal artery stenosis. Right lower extremity: Moderate stenosis of the common femoral artery and  diffuse high-grade disease of SFA. Left lower extremity: Occlusion of SFA reconstitution at the popliteal artery level. Bilateral pleural effusions. Cholelithiasis. recommend vascular surgical consultation.     8/10:  CXR:  Interstitial prominence which may represent edema. Small patchy density partially obscures right hemidiaphragm. There are sternal wires.      ECHOCARDIOGRAM:  -  Left ventricle: Systolic function was normal. Ejection fraction was estimated in the range of 60 % to 65 %. There were no regional wall motion abnormalities. There was mild concentric hypertrophy. Left ventricular diastolic function parameters suggest diastolic dysfunction. -  Right ventricle: There was mild pulmonary artery hypertension. -  Left atrium: The atrium was moderately to markedly dilated. -  Right atrium: The atrium was moderately dilated. -  Aortic valve: The valve was trileaflet. Leaflets exhibited mild to   Moderate calcification and reduced mobility. There was moderate regurgitation. The aortic valve area by the continuity equation was 2.05 cm2. The mean pressure gradient was 18 mmHg. The findings were most consistent with mild aortic stenosis. -  Mitral valve: There was mild annular calcification. There was mild to moderate regurgitation. -  Tricuspid valve:  There was mild regurgitation    Assessment/Plan:   CAP  AAA, 6.1 cm  Altered mental status with acute metabolic [id number](9/5/5206)  Hypothyroidism  Acute UTI   Fever   Hypertension, poorly controlled  Persistent leukocytosis due to steroids     PLAN:  ID stopped Abx since patient completed full treatment for UTI and CAP  Leukocytosis from steriods  ID recommends Vancomycin AND Cefazolin pre-op  Patient s/p AAA repair on 8/16, with be monitored in ICU post op    Care Plan discussed with:  Patient    Signed By: Heber Carvajal MD     August 16, 2017

## 2017-08-16 NOTE — PERIOP NOTES
Spoke with Dr. Bradley Mina who stated he did not put the order in for vancomycin. No new orders at this time.

## 2017-08-16 NOTE — PROGRESS NOTES
Dual skin assessment performed with Abel Price RN. Skin pale pink. Bilateral groin incisions noted with dressing C/D/I. Minute shadowing to left groin dressing. No hematoma noted. No wounds noted. Toenails mycotic. Sacrum is pink, but blanchable and allevyn applied.

## 2017-08-16 NOTE — PROGRESS NOTES
Patient in bed resting with no complaints at this time. Patient is alert and orientated with no distress noted. IV intact and patient with no s/s of infection noted. Respirations even and unlabored with heart rate regular. Patient able to ambulate independently without assistance during the day, at night patient needs posey. Bed in low locked position with call light within reach. Patient instructed to call if assistance is needed. Will continue to monitor.

## 2017-08-16 NOTE — ANESTHESIA POSTPROCEDURE EVALUATION
Post-Anesthesia Evaluation and Assessment    Patient: Francisco Funes MRN: [id number]  SSN: [id number]    YOB: 1931  Age: 80 y.o. Sex: male       Cardiovascular Function/Vital Signs  Visit Vitals    /59    Pulse 65    Temp 36.6 °C (97.8 °F)    Resp 16    Ht 5' 4\" (1.626 m)    Wt 67 kg (147 lb 9.6 oz)    SpO2 98%    BMI 25.34 kg/m2       Patient is status post general anesthesia for Procedure(s):  AORTIC ABDOMINAL ANEURYSM REPAIR ENDOVASCULAR (EVAR) WITH  BILATERAL CUT-DOWNS . Nausea/Vomiting: None    Postoperative hydration reviewed and adequate. Pain:  Pain Scale 1: Visual (08/16/17 1524)  Pain Intensity 1: 6 (08/16/17 1524)   Managed    Neurological Status:   Neuro (WDL): Exceptions to WDL (08/16/17 1510)  Neuro  Neurologic State: Sleeping;Agitated (08/16/17 1510)  Orientation Level: Other (Comment) (08/16/17 9112)  Cognition: Appropriate for age attention/concentration; Follows commands (08/16/17 9744)  Speech: Clear (08/16/17 0738)  LUE Motor Response: Spontaneous  (08/16/17 1510)  LLE Motor Response: Spontaneous  (08/16/17 1510)  RUE Motor Response: Spontaneous  (08/16/17 1510)  RLE Motor Response: Spontaneous  (08/16/17 1510)   At baseline    Mental Status and Level of Consciousness: Arousable    Pulmonary Status:   O2 Device: Nasal cannula (08/16/17 1510)   Adequate oxygenation and airway patent    Complications related to anesthesia: None    Post-anesthesia assessment completed.  No concerns    Signed By: Praveena Barlow MD     August 16, 2017

## 2017-08-17 LAB
BACTERIA SPEC CULT: NORMAL
MAGNESIUM SERPL-MCNC: 2.2 MG/DL (ref 1.8–2.4)
SERVICE CMNT-IMP: NORMAL

## 2017-08-17 PROCEDURE — 83735 ASSAY OF MAGNESIUM: CPT | Performed by: SURGERY

## 2017-08-17 PROCEDURE — 94640 AIRWAY INHALATION TREATMENT: CPT

## 2017-08-17 PROCEDURE — 74011000250 HC RX REV CODE- 250: Performed by: SURGERY

## 2017-08-17 PROCEDURE — 74011250637 HC RX REV CODE- 250/637: Performed by: SURGERY

## 2017-08-17 PROCEDURE — 74011250636 HC RX REV CODE- 250/636: Performed by: HOSPITALIST

## 2017-08-17 PROCEDURE — 65270000029 HC RM PRIVATE

## 2017-08-17 PROCEDURE — 74011250637 HC RX REV CODE- 250/637: Performed by: INTERNAL MEDICINE

## 2017-08-17 PROCEDURE — 74011000250 HC RX REV CODE- 250: Performed by: NURSE PRACTITIONER

## 2017-08-17 PROCEDURE — 74011250637 HC RX REV CODE- 250/637: Performed by: HOSPITALIST

## 2017-08-17 PROCEDURE — 77030011943

## 2017-08-17 RX ADMIN — DOCUSATE SODIUM 100 MG: 100 CAPSULE, LIQUID FILLED ORAL at 17:44

## 2017-08-17 RX ADMIN — ATORVASTATIN CALCIUM 40 MG: 40 TABLET, FILM COATED ORAL at 22:13

## 2017-08-17 RX ADMIN — Medication 10 ML: at 22:14

## 2017-08-17 RX ADMIN — PANTOPRAZOLE SODIUM 40 MG: 40 TABLET, DELAYED RELEASE ORAL at 08:18

## 2017-08-17 RX ADMIN — IPRATROPIUM BROMIDE AND ALBUTEROL SULFATE 3 ML: 2.5; .5 SOLUTION RESPIRATORY (INHALATION) at 15:12

## 2017-08-17 RX ADMIN — BUDESONIDE 500 MCG: 0.5 SUSPENSION RESPIRATORY (INHALATION) at 20:15

## 2017-08-17 RX ADMIN — HYDRALAZINE HYDROCHLORIDE 50 MG: 50 TABLET ORAL at 22:13

## 2017-08-17 RX ADMIN — IPRATROPIUM BROMIDE AND ALBUTEROL SULFATE 3 ML: 2.5; .5 SOLUTION RESPIRATORY (INHALATION) at 20:15

## 2017-08-17 RX ADMIN — HYDRALAZINE HYDROCHLORIDE 50 MG: 50 TABLET ORAL at 16:19

## 2017-08-17 RX ADMIN — BUDESONIDE 500 MCG: 0.5 SUSPENSION RESPIRATORY (INHALATION) at 08:26

## 2017-08-17 RX ADMIN — ENOXAPARIN SODIUM 30 MG: 30 INJECTION SUBCUTANEOUS at 14:11

## 2017-08-17 RX ADMIN — IPRATROPIUM BROMIDE AND ALBUTEROL SULFATE 3 ML: 2.5; .5 SOLUTION RESPIRATORY (INHALATION) at 11:28

## 2017-08-17 RX ADMIN — METOPROLOL TARTRATE 25 MG: 25 TABLET ORAL at 22:13

## 2017-08-17 RX ADMIN — HYDRALAZINE HYDROCHLORIDE 50 MG: 50 TABLET ORAL at 08:18

## 2017-08-17 RX ADMIN — DOCUSATE SODIUM 100 MG: 100 CAPSULE, LIQUID FILLED ORAL at 08:18

## 2017-08-17 RX ADMIN — OXYCODONE HYDROCHLORIDE AND ACETAMINOPHEN 1 TABLET: 5; 325 TABLET ORAL at 17:44

## 2017-08-17 RX ADMIN — Medication 10 ML: at 14:11

## 2017-08-17 RX ADMIN — Medication 10 ML: at 05:00

## 2017-08-17 RX ADMIN — BACLOFEN 10 MG: 10 TABLET ORAL at 08:18

## 2017-08-17 RX ADMIN — FERROUS SULFATE TAB 325 MG (65 MG ELEMENTAL FE) 325 MG: 325 (65 FE) TAB at 08:18

## 2017-08-17 RX ADMIN — ASPIRIN 81 MG: 81 TABLET, COATED ORAL at 08:18

## 2017-08-17 RX ADMIN — METOPROLOL TARTRATE 25 MG: 25 TABLET ORAL at 08:18

## 2017-08-17 RX ADMIN — METOPROLOL TARTRATE 5 MG: 5 INJECTION INTRAVENOUS at 06:50

## 2017-08-17 RX ADMIN — IPRATROPIUM BROMIDE AND ALBUTEROL SULFATE 3 ML: 2.5; .5 SOLUTION RESPIRATORY (INHALATION) at 03:55

## 2017-08-17 RX ADMIN — FINASTERIDE 5 MG: 5 TABLET, FILM COATED ORAL at 08:18

## 2017-08-17 RX ADMIN — IPRATROPIUM BROMIDE AND ALBUTEROL SULFATE 3 ML: 2.5; .5 SOLUTION RESPIRATORY (INHALATION) at 08:26

## 2017-08-17 RX ADMIN — BACLOFEN 10 MG: 10 TABLET ORAL at 17:44

## 2017-08-17 RX ADMIN — LEVOTHYROXINE SODIUM 100 MCG: 100 TABLET ORAL at 08:18

## 2017-08-17 NOTE — PROGRESS NOTES
OT NOTE:    Therapist is discharging patient from OT at this time due to decline in medical status and transfer to ICU. Please reconsult OT when MD deems patient appropriate for continued services. Thank you.   Janny Bautista MS, OTR/L  8/17/2017

## 2017-08-17 NOTE — PROGRESS NOTES
Problem: Nutrition Deficit  Goal: *Optimize nutritional status  Nutrition F/U:  Assessment:  Weight 67 kg (stated wgt 8/16/17), edema - none. The patient is now s/p infrarenal AAA repair on 8/16/17. His diet was resumed with a clear liquid tray. Good acceptance noted. Last bowel movement was 8/15. No labs today. Macronutrient Needs:  · EER:  7669-8274 kcal /day (25-30 kcal/kg actual BW)  · EPR:  59-71 grams protein/day (1-1.2 grams/kg IBW)  Intake/Comparative Standards: This patient's average meal intake of cardiac diet for the past 6 recorded days/16 meals: 98%. This potentially meets 100% of calorie and 100% of protein needs. Intervention:   Meals and Snacks: Clear liquids, resume cardiac diet as able. Coordination of Nutrition Care by a Nutrition Professional: AM ICU rounds, collaboration with Jarrett Mcbride. Nutrition Discharge Plan: Too soon to determine. Joanie Lazaro.  Michelle Chaudhry  [phone]

## 2017-08-17 NOTE — PROGRESS NOTES
Patient remains alert with periodic confusion, BP well controlled, bilateral groin sites c/d/i, denies pain. VSS. Will continue to monitor.

## 2017-08-17 NOTE — PROGRESS NOTES
8/17/2017 5:47 PM    Admit Date: 8/1/2017    Admit Diagnosis: Abdominal aortic aneurysm (AAA) without rupture ([facility] [address] [N62. *      Subjective:   No cp or sob      Objective:      Visit Vitals    /56    Pulse 84    Temp 96.9 °F (36.1 °C)    Resp 17    Ht 5' 4\" (1.626 m)    Wt 67 kg (147 lb 9.6 oz)    SpO2 98%    BMI 25.34 kg/m2       Physical Exam:  Lamine Lessen, Well Nourished, No Acute Distress, Alert & Oriented x 3, appropriate mood. Neck- supple, no JVD  CV- regular rate and rhythm no MRG  Lung- clear bilaterally  Abd- soft, nontender, nondistended  Ext- no edema bilaterally. Skin- warm and dry        Data Review:   Recent Labs      08/16/17   0624   NA  143   K  3.9   BUN  24*   CREA  1.37   WBC  9.9   HGB  11.8*   HCT  35.3*   PLT  345   INR  1.0       Assessment/Plan:     Principal Problem:    CAP (community acquired pneumonia) (8/1/2017)Stable. Continue current medical therapy. Active Problems:    Altered mental status (8/1/2017)  CAD/Chronic diastolic chf- Stable. Continue current medical therapy. Limit ivfs      UTI (urinary tract infection) (8/1/2017)    Fever (8/1/2017)      HCAP (healthcare-associated pneumonia) (8/11/2017)

## 2017-08-17 NOTE — PROGRESS NOTES
Spiritual Care visit. Follow up visit.  visited with patient and with family members. Prayed for his recovery and health.     Visit by Nabeel Mallory M.Ed., Th.B. ,Staff

## 2017-08-17 NOTE — PROGRESS NOTES
Looks good. AF VSS overnight  Groin wounds clean, dressings dry  Doppler pedal signals    Plan Tx-> floor today.

## 2017-08-17 NOTE — PROGRESS NOTES
Physical Therapy Note:    Therapist is discontinuing acute PT services secondary to decline in medical status and transfer to the ICU. Patient's goals were not met. Please re-consult acute PT/OT services when medically appropriate.  Thank you,    Federico Cortes PT, DPT  8/17/17 8:30AM

## 2017-08-17 NOTE — PROGRESS NOTES
Hospitalist Progress Note    2017  Admit Date: 2017 12:17 AM   NAME: Darlene Decker   :  10/18/1931   MRN:  [id number]   Attending: Diallo Molina MD  PCP:  None    SUBJECTIVE:     Darlene Decker is a 50EMB with HTN, CKD, dementia, hypothyroidism who was admitted on  with UTI, JEB, hypoxia, and pneumonia. Started on abx, has been improving. Has had increased leukocytosis. ID was consulted and abx stopped, leukocytosis attributed to steroids. US found 4cm AAA and found to have high grade L renal artery stenosis. Had AAA yesterday, with post-op care in the ICU. Has been cleared by vascular for transfer to the floor. He is on RA, sitting in chair. Doing well. Denies post-op pain, SOB, CP, nausea. Review of Systems negative with exception of pertinent positives noted above      PHYSICAL EXAM       Visit Vitals    /56    Pulse 84    Temp 96.9 °F (36.1 °C)    Resp 17    Ht 5' 4\" (1.626 m)    Wt 67 kg (147 lb 9.6 oz)    SpO2 97%    BMI 25.34 kg/m2      Temp (24hrs), Av.5 °F (36.4 °C), Min:96.9 °F (36.1 °C), Max:97.8 °F (36.6 °C)    Oxygen Therapy  O2 Sat (%): 97 % (17 1128)  Pulse via Oximetry: 62 beats per minute (17 1128)  O2 Device: Room air (17 1128)  O2 Flow Rate (L/min): 0 l/min (17 1128)    Intake/Output Summary (Last 24 hours) at 17 1456  Last data filed at 17 0800   Gross per 24 hour   Intake             1130 ml   Output              975 ml   Net              155 ml          General: No acute distress. Sitting in chair  Head:  Atraumatic Normocephalic. Eyes:  Anicteric. Lungs:  CTA Bilaterally. Nonlabored on RA  CVS:  RRR, no BLE edema  Abdomen: Soft, NTTP, +BS  MSK:  Spontaneously moves extremities.   Neurologic:  No focal deficits      Recent Results (from the past 24 hour(s))   MRSA SCREEN - PCR (NASAL)    Collection Time: 17  6:00 PM   Result Value Ref Range    Special Requests: NO SPECIAL REQUESTS      Culture result:        MRSA target DNA is not detected (presumptive not colonized with MRSA)   MAGNESIUM    Collection Time: 08/17/17  4:50 AM   Result Value Ref Range    Magnesium 2.2 1.8 - 2.4 mg/dL         Imaging /Procedures /Studies   IR FLUOROSCOPY < 1 HOUR   Final Result      XR CHEST PORT   Final Result   IMPRESSION: Interstitial prominence which may represent edema. Small patchy   density partially obscures right hemidiaphragm. There are sternal wires. CTA ABD ART W RUNOFF W WO CONT   Final Result   IMPRESSION:   1. Infrarenal abdominal aortic aneurysm with saccular component measuring 4.0, x   6.7 cm. 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 2. High-grade left renal artery stenosis. 3. Right lower extremity: Moderate stenosis of the common femoral artery and   diffuse high-grade disease of SFA. 4. Left lower extremity: Occlusion of SFA reconstitution at the popliteal artery   level. 5. Bilateral pleural effusions. Cholelithiasis. Recommend vascular surgical consultation. DC4   The significant findings in this report have been referred to the Imaging   Navigator in order to communicate to the referring provider or his/her designee   as outlined in Section II.C.2.a.ii-iii of the ACR   Practice Guideline for Communication of Diagnostic Imaging Findings. .      XR CHEST SNGL V   Final Result   IMPRESSION:    1.  Stable cardiomegaly without evolving acute changes seen. US RETROPERITONEUM COMP   Final Result   IMPRESSION:      1) A 4.1 cm abdominal aortic aneurysm with peripheral thrombus. 2) Negative for hydronephrosis. Abnormally increased renal echogenicity. 3) Simple renal cysts.                   ASSESSMENT      Hospital Problems as of 8/17/2017  Never Reviewed          Codes Class Noted - Resolved POA    HCAP (healthcare-associated pneumonia) ICD-10-CM: J18.9  ICD-9-CM: 762  8/11/2017 - Present Unknown        * (Principal)CAP (community acquired pneumonia) ICD-10-CM: J18.9  ICD-9-CM: 754  8/1/2017 - Present Unknown        Altered mental status ICD-10-CM: R41.82  ICD-9-CM: 780.97  8/1/2017 - Present Yes        UTI (urinary tract infection) ICD-10-CM: N39.0  ICD-9-CM: 599.0  8/1/2017 - Present Yes        Fever ICD-10-CM: R50.9  ICD-9-CM: 780.60  8/1/2017 - Present Yes                  Plan:  - has completed abx course for UTI and CAP  - ID stopped abx as leukocytosis is likely from steroids  - s/p AAA repair on 8/16, post-op management per vascular  - AMS resolved  - hypoxia resolved  - continue home meds    DVT Prophylaxis: Lovenox  Dispo: reconsult PT/OT, PPD has been placed. Likely needs STR at discharge.     * transfer to floor    Domonique Gamboa MD

## 2017-08-17 NOTE — PROGRESS NOTES
Bedside and Verbal shift change report given to Terrie Brooks RN by Yannick Pineda RN. Report included the following information SBAR, Kardex, Intake/Output, MAR, Recent Results and Med Rec Status.

## 2017-08-17 NOTE — PROGRESS NOTES
Chart reviewed after transfer to ICU. Pt was seen by Kendra Hunter from 8th floor and working with family, step son, regarding POA/HCPOA. Also, d/c POC was Peter K[facility]. Nayana Phoenix, liaison aware of transfer to ICU and is following. PPD already placed and PT/OT to see. CM will continue to follow for d/c needs.

## 2017-08-18 PROBLEM — I71.40 ABDOMINAL AORTIC ANEURYSM (AAA) WITHOUT RUPTURE: Status: ACTIVE | Noted: 2017-08-18

## 2017-08-18 PROBLEM — N39.0 ENTEROCOCCUS UTI: Status: ACTIVE | Noted: 2017-08-18

## 2017-08-18 PROBLEM — B95.2 ENTEROCOCCUS UTI: Status: ACTIVE | Noted: 2017-08-18

## 2017-08-18 LAB
ANION GAP SERPL CALC-SCNC: 10 MMOL/L (ref 7–16)
BUN SERPL-MCNC: 26 MG/DL (ref 8–23)
CALCIUM SERPL-MCNC: 8.3 MG/DL (ref 8.3–10.4)
CHLORIDE SERPL-SCNC: 108 MMOL/L (ref 98–107)
CO2 SERPL-SCNC: 23 MMOL/L (ref 21–32)
CREAT SERPL-MCNC: 1.5 MG/DL (ref 0.8–1.5)
ERYTHROCYTE [DISTWIDTH] IN BLOOD BY AUTOMATED COUNT: 15.4 % (ref 11.9–14.6)
GLUCOSE SERPL-MCNC: 113 MG/DL (ref 65–100)
HCT VFR BLD AUTO: 28.1 % (ref 41.1–50.3)
HGB BLD-MCNC: 9.3 G/DL (ref 13.6–17.2)
MAGNESIUM SERPL-MCNC: 2.4 MG/DL (ref 1.8–2.4)
MCH RBC QN AUTO: 30.6 PG (ref 26.1–32.9)
MCHC RBC AUTO-ENTMCNC: 33.1 G/DL (ref 31.4–35)
MCV RBC AUTO: 92.4 FL (ref 79.6–97.8)
PLATELET # BLD AUTO: 273 K/UL (ref 150–450)
PMV BLD AUTO: 10.6 FL (ref 10.8–14.1)
POTASSIUM SERPL-SCNC: 3.9 MMOL/L (ref 3.5–5.1)
RBC # BLD AUTO: 3.04 M/UL (ref 4.23–5.67)
SODIUM SERPL-SCNC: 141 MMOL/L (ref 136–145)
WBC # BLD AUTO: 13.9 K/UL (ref 4.3–11.1)

## 2017-08-18 PROCEDURE — 74011250636 HC RX REV CODE- 250/636: Performed by: HOSPITALIST

## 2017-08-18 PROCEDURE — 74011000250 HC RX REV CODE- 250: Performed by: NURSE PRACTITIONER

## 2017-08-18 PROCEDURE — 80048 BASIC METABOLIC PNL TOTAL CA: CPT | Performed by: SURGERY

## 2017-08-18 PROCEDURE — 94760 N-INVAS EAR/PLS OXIMETRY 1: CPT

## 2017-08-18 PROCEDURE — 87086 URINE CULTURE/COLONY COUNT: CPT | Performed by: NURSE PRACTITIONER

## 2017-08-18 PROCEDURE — 94640 AIRWAY INHALATION TREATMENT: CPT

## 2017-08-18 PROCEDURE — 97164 PT RE-EVAL EST PLAN CARE: CPT

## 2017-08-18 PROCEDURE — 65270000029 HC RM PRIVATE

## 2017-08-18 PROCEDURE — 36415 COLL VENOUS BLD VENIPUNCTURE: CPT | Performed by: SURGERY

## 2017-08-18 PROCEDURE — 74011250637 HC RX REV CODE- 250/637: Performed by: INTERNAL MEDICINE

## 2017-08-18 PROCEDURE — 74011250637 HC RX REV CODE- 250/637: Performed by: HOSPITALIST

## 2017-08-18 PROCEDURE — 83735 ASSAY OF MAGNESIUM: CPT | Performed by: SURGERY

## 2017-08-18 PROCEDURE — 85027 COMPLETE CBC AUTOMATED: CPT | Performed by: SURGERY

## 2017-08-18 PROCEDURE — 97535 SELF CARE MNGMENT TRAINING: CPT

## 2017-08-18 PROCEDURE — 97168 OT RE-EVAL EST PLAN CARE: CPT

## 2017-08-18 RX ADMIN — Medication 10 ML: at 13:44

## 2017-08-18 RX ADMIN — FINASTERIDE 5 MG: 5 TABLET, FILM COATED ORAL at 09:30

## 2017-08-18 RX ADMIN — ENOXAPARIN SODIUM 30 MG: 30 INJECTION SUBCUTANEOUS at 13:40

## 2017-08-18 RX ADMIN — IPRATROPIUM BROMIDE AND ALBUTEROL SULFATE 3 ML: 2.5; .5 SOLUTION RESPIRATORY (INHALATION) at 19:58

## 2017-08-18 RX ADMIN — BUDESONIDE 500 MCG: 0.5 SUSPENSION RESPIRATORY (INHALATION) at 19:58

## 2017-08-18 RX ADMIN — LEVOTHYROXINE SODIUM 100 MCG: 100 TABLET ORAL at 06:46

## 2017-08-18 RX ADMIN — IPRATROPIUM BROMIDE AND ALBUTEROL SULFATE 3 ML: 2.5; .5 SOLUTION RESPIRATORY (INHALATION) at 11:38

## 2017-08-18 RX ADMIN — METOPROLOL TARTRATE 25 MG: 25 TABLET ORAL at 09:30

## 2017-08-18 RX ADMIN — IPRATROPIUM BROMIDE AND ALBUTEROL SULFATE 3 ML: 2.5; .5 SOLUTION RESPIRATORY (INHALATION) at 05:00

## 2017-08-18 RX ADMIN — DOCUSATE SODIUM 100 MG: 100 CAPSULE, LIQUID FILLED ORAL at 09:30

## 2017-08-18 RX ADMIN — HYDRALAZINE HYDROCHLORIDE 50 MG: 50 TABLET ORAL at 09:30

## 2017-08-18 RX ADMIN — HYDRALAZINE HYDROCHLORIDE 50 MG: 50 TABLET ORAL at 21:16

## 2017-08-18 RX ADMIN — FERROUS SULFATE TAB 325 MG (65 MG ELEMENTAL FE) 325 MG: 325 (65 FE) TAB at 06:46

## 2017-08-18 RX ADMIN — ASPIRIN 81 MG: 81 TABLET, COATED ORAL at 09:30

## 2017-08-18 RX ADMIN — IPRATROPIUM BROMIDE AND ALBUTEROL SULFATE 3 ML: 2.5; .5 SOLUTION RESPIRATORY (INHALATION) at 23:32

## 2017-08-18 RX ADMIN — METOPROLOL TARTRATE 25 MG: 25 TABLET ORAL at 21:15

## 2017-08-18 RX ADMIN — Medication 10 ML: at 06:46

## 2017-08-18 RX ADMIN — DOCUSATE SODIUM 100 MG: 100 CAPSULE, LIQUID FILLED ORAL at 17:44

## 2017-08-18 RX ADMIN — ATORVASTATIN CALCIUM 40 MG: 40 TABLET, FILM COATED ORAL at 21:15

## 2017-08-18 RX ADMIN — Medication 10 ML: at 21:16

## 2017-08-18 RX ADMIN — IPRATROPIUM BROMIDE AND ALBUTEROL SULFATE 3 ML: 2.5; .5 SOLUTION RESPIRATORY (INHALATION) at 15:56

## 2017-08-18 RX ADMIN — PANTOPRAZOLE SODIUM 40 MG: 40 TABLET, DELAYED RELEASE ORAL at 06:46

## 2017-08-18 RX ADMIN — HYDRALAZINE HYDROCHLORIDE 50 MG: 50 TABLET ORAL at 15:30

## 2017-08-18 RX ADMIN — BACLOFEN 10 MG: 10 TABLET ORAL at 17:44

## 2017-08-18 NOTE — PROGRESS NOTES
Date of Outreach Update:  Michi Lugo was seen and assessed. Previous Outreach assessment has been reviewed. There have been no significant clinical changes since the completion of the last dated Outreach assessment. Sitting up in recliner eating lunch. SpO2 97% on RA pulse 82-85. Groin site on right less puffy. Will continue to follow up per outreach protocol.     Signed By:   Mary Raymond RN    August 18, 2017 1:34 PM

## 2017-08-18 NOTE — PROGRESS NOTES
Problem: Patient Education: Go to Patient Education Activity  Goal: Patient/Family Education  Outcome: Not Progressing Towards Goal  Pt instructed to use call light if needing assistance to go to bathroom. Posey alarm placed under pt after finding pt up from bed without assistance and holding onto chair and bed. Gait unsteady. Pt taken to bathroom, and returned to bed. Alarm on. Pt repeatedly gets up without assistance despite reiterating instructions. Per CNA, pt is not urinating or having BM when he goes to use commode. Will continue to follow.

## 2017-08-18 NOTE — PROGRESS NOTES
Date of Outreach Update:  Jeanette Amanda was seen and assessed. Previous Outreach assessment has been reviewed. There have been no significant clinical changes since the completion of the last dated Outreach assessment. Resting in bed presently lying on right side. SpO2 97% on RA. No distress noted. Will continue to follow up per outreach protocol.     Signed By:   Madan Licona RN    August 18, 2017 5:43 PM

## 2017-08-18 NOTE — INTERDISCIPLINARY ROUNDS
Interdisciplinary team rounds were held 8/18/2017 with the following team members:Care Management, Nursing, Pharmacy, Physical Therapy and Physician. Plan of care discussed. See clinical pathway and/or care plan for interventions and desired outcomes.

## 2017-08-18 NOTE — PROGRESS NOTES
Dual skin assessment completed with second nurse, Patient has two two incisions to groin area that his dry and intact. No sign of breakdown noted. Patient oriented to room and call light.

## 2017-08-18 NOTE — PROGRESS NOTES
Hospitalist Progress Note    2017  Admit Date: 2017 12:17 AM   NAME: Joan Del Cid   :  10/18/1931   MRN:  [id number]   Attending: Isaac Dawn MD  PCP:  None    SUBJECTIVE:   As previously documented:  'Joan Del Cid is a 84LYM with HTN, CKD, dementia, hypothyroidism who was admitted on  with UTI, JEB, hypoxia, and pneumonia. Started on abx, has been improving. Has had increased leukocytosis. ID was consulted and abx stopped, leukocytosis attributed to steroids. US found 4cm AAA and found to have high grade L renal artery stenosis. Had AAA yesterday, with post-op care in the ICU. Has been cleared by vascular for transfer to the floor. He is on RA, sitting in chair. Doing well. Denies post-op pain, SOB, CP, nausea.'    17- he was seen without family at bedside. Denies complaints and states he feels 'okay.'  He was had endovascular AAA repair and with bilateral femoral cutdown and bilateral external iliac artery PCTA on . He is debilitated post-op and has been getting PT. Needs STR and insurance pre-cert pending. Prior to surgery, he had been fully treated for pan-sensitive Enterococcus UTI.       Review of Systems negative with exception of pertinent positives noted above  PHYSICAL EXAM     Visit Vitals    /69 (BP 1 Location: Left arm, BP Patient Position: At rest;Sitting)    Pulse 75    Temp 98 °F (36.7 °C)    Resp 22    Ht 5' 4\" (1.626 m)    Wt 67 kg (147 lb 9.6 oz)    SpO2 96%    BMI 25.34 kg/m2      Temp (24hrs), Av.6 °F (37 °C), Min:98 °F (36.7 °C), Max:99.1 °F (37.3 °C)    Oxygen Therapy  O2 Sat (%): 96 % (17 1558)  Pulse via Oximetry: 89 beats per minute (17 0500)  O2 Device: Room air (17 1558)  O2 Flow Rate (L/min): 0 l/min (17 1512)  FIO2 (%): 21 % (08/17/17 2015)    Intake/Output Summary (Last 24 hours) at 17 1610  Last data filed at 17 0647   Gross per 24 hour   Intake              120 ml   Output 750 ml   Net             -630 ml      General: No acute distress, alert and oriented to self and year    Lungs:  CTA Bilaterally. Heart:  Regular rate and rhythm,  No murmur, rub, or gallop  Abdomen: Soft, Non distended, Non tender, Positive bowel sounds  Extremities: No cyanosis, clubbing or edema, femoral surgical incision sites covered  Neurologic:  No focal deficits    Recent Results (from the past 24 hour(s))   MAGNESIUM    Collection Time: 08/18/17  6:01 AM   Result Value Ref Range    Magnesium 2.4 1.8 - 2.4 mg/dL   METABOLIC PANEL, BASIC    Collection Time: 08/18/17  6:01 AM   Result Value Ref Range    Sodium 141 136 - 145 mmol/L    Potassium 3.9 3.5 - 5.1 mmol/L    Chloride 108 (H) 98 - 107 mmol/L    CO2 23 21 - 32 mmol/L    Anion gap 10 7 - 16 mmol/L    Glucose 113 (H) 65 - 100 mg/dL    BUN 26 (H) 8 - 23 MG/DL    Creatinine 1.50 0.8 - 1.5 MG/DL    GFR est AA 57 (L) >60 ml/min/1.73m2    GFR est non-AA 47 (L) >60 ml/min/1.73m2    Calcium 8.3 8.3 - 10.4 MG/DL   CBC W/O DIFF    Collection Time: 08/18/17  6:01 AM   Result Value Ref Range    WBC 13.9 (H) 4.3 - 11.1 K/uL    RBC 3.04 (L) 4.23 - 5.67 M/uL    HGB 9.3 (L) 13.6 - 17.2 g/dL    HCT 28.1 (L) 41.1 - 50.3 %    MCV 92.4 79.6 - 97.8 FL    MCH 30.6 26.1 - 32.9 PG    MCHC 33.1 31.4 - 35.0 g/dL    RDW 15.4 (H) 11.9 - 14.6 %    PLATELET 382 183 - 945 K/uL    MPV 10.6 (L) 10.8 - 14.1 FL     IR FLUOROSCOPY < 1 HOUR   Final Result      XR CHEST PORT   Final Result   IMPRESSION: Interstitial prominence which may represent edema. Small patchy   density partially obscures right hemidiaphragm. There are sternal wires. CTA ABD ART W RUNOFF W WO CONT   Final Result   IMPRESSION:   1. Infrarenal abdominal aortic aneurysm with saccular component measuring 4.0, x   6.7 cm. 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 2. High-grade left renal artery stenosis.    3. Right lower extremity: Moderate stenosis of the common femoral artery and   diffuse high-grade disease of SFA. 4. Left lower extremity: Occlusion of SFA reconstitution at the popliteal artery   level. 5. Bilateral pleural effusions. Cholelithiasis. Recommend vascular surgical consultation. DC4   The significant findings in this report have been referred to the Imaging   Navigator in order to communicate to the referring provider or his/her designee   as outlined in Section II.C.2.a.ii-iii of the ACR   Practice Guideline for Communication of Diagnostic Imaging Findings. .      XR CHEST SNGL V   Final Result   IMPRESSION:    1.  Stable cardiomegaly without evolving acute changes seen. US RETROPERITONEUM COMP   Final Result   IMPRESSION:      1) A 4.1 cm abdominal aortic aneurysm with peripheral thrombus. 2) Negative for hydronephrosis. Abnormally increased renal echogenicity. 3) Simple renal cysts. ASSESSMENT      Active Hospital Problems    Diagnosis Date Noted    Abdominal aortic aneurysm (AAA) without rupture ([facility] [address] 08/18/2017    Enterococcus UTI 08/18/2017    Altered mental status 08/01/2017    Fever 08/01/2017     Plan:  · AAA- s/p repair on 8/16/17. · Enterococcus UTI- s/p treatment with ampicililn/zosyn/vanc prior to surgery. · Altered mental status- likely back to baseline. · Leukocytosis- afebrile. Likely stress reaction to surgery. Monitor. · Post-op acute blood loss anemia- mild. Monitor. DVT Prophylaxis: Lovenox    Dispo- hopefully to rehab soon pending insurance pre-cert. Signed By: Gaviota Weston MD     August 18, 2017

## 2017-08-18 NOTE — PROGRESS NOTES
Pt returned to bed. Pt continues to rise from chair despite instructions to call for assistance first. Posey alarm placed under pt. Call light within reach. Pt instructed to call before getting OOB. Yellow socks on. Door left open. Will continue to monitor.

## 2017-08-18 NOTE — PROGRESS NOTES
Alerted hospitalist of pt's frequent voiding and urgency to use bathroom. Hospitalist instructed to conduct post void scan to observe for any retention. Will continue to monitor patient.

## 2017-08-18 NOTE — PROGRESS NOTES
[facility] CRITICAL CARE OUTREACH NURSE PROGRESS REPORT      SUBJECTIVE: In to assess patient secondary to transfer ICU    MEWS Score: 1 (08/18/17 0740)  Vitals:    08/18/17 0500 08/18/17 0740 08/18/17 0928 08/18/17 1031   BP:  122/59     Pulse:  75  76   Resp:  16     Temp:  98.7 °F (37.1 °C)     SpO2: 97% 95% 97%    Weight:       Height:          EKG: normal sinus rhythm, PAC's noted, frequent PVC's noted. LAB DATA:    Recent Labs      08/18/17   0601  08/17/17   0450  08/16/17   0624   NA  141   --   143   K  3.9   --   3.9   CL  108*   --   110*   CO2  23   --   26   AGAP  10   --   7   GLU  113*   --   96   BUN  26*   --   24*   CREA  1.50   --   1.37   GFRAA  57*   --   >60   GFRNA  47*   --   52*   CA  8.3   --   8.3   MG  2.4  2.2  2.4        Recent Labs      08/18/17   0601  08/16/17   0624   WBC  13.9*  9.9   HGB  9.3*  11.8*   HCT  28.1*  35.3*   PLT  273  345          OBJECTIVE: On arrival to room, I found patient to be in bed with primary nurse changing bed pad and gown. General appearance: alert, cooperative, no distress, appears stated age  Lungs: clear to auscultation bilaterally  Abdomen: soft, non-tender. Bowel sounds normal.   Extremities: extremities normal, atraumatic, no cyanosis or edema  Skin: Skin color, texture, turgor normal.  Neurologic: Alert and oriented X 3, normal strength and tone. Normal symmetric reflexes. Normal coordination       Pain Assessment  Pain Intensity 1: 0 (08/18/17 0200)  Pain Location 1: Groin  Pain Intervention(s) 1: Medication (see MAR)  Patient Stated Pain Goal: 0      ASSESSMENT:  SpO2 97% on RA. Bilateral groin sites intact with dressings C/D/I. Noted puffiness to right groin site; non tender. Denies pain or discomfort. IV sites bilateral arms. PLAN:  Will continue to follow per protocol.

## 2017-08-18 NOTE — PROGRESS NOTES
Patient has been unable to void, painful to try; bladder scan reveals 475ml, in and out cath with 550ml clear yellow urine. States feeling much better now. Denies pain at this time. VSS. NAD noted.

## 2017-08-18 NOTE — PROGRESS NOTES
Problem: Mobility Impaired (Adult and Pediatric)  Goal: *Acute Goals and Plan of Care (Insert Text)  STG:  (1.)Mr. Scheyder will move from supine to sit and sit to supine , scoot up and down and roll side to side with CONTACT GUARD ASSIST within 3 day(s). (2.)Mr. Scheyder will transfer from bed to chair and chair to bed with CONTACT GUARD ASSIST using the least restrictive device within 3 day(s). (3.)Mr. Scheyder will ambulate with CONTACT GUARD ASSIST for 30 feet with the least restrictive device within 3 day(s). LTG:  (1.)Mr. Scheyder will move from supine to sit and sit to supine , scoot up and down and roll side to side in bed with MODIFIED INDEPENDENCE within 7 day(s). (2.)Mr. Scheyder will transfer from bed to chair and chair to bed with MODIFIED INDEPENDENCE using the least restrictive device within 7 day(s). (3.)Mr. Scheyder will ambulate with SUPERVISION for 150+ feet with the least restrictive device within 7 day(s). PHYSICAL THERAPY: Daily Note, Treatment Day: 5th and AM 8/18/2017  INPATIENT: Hospital Day: 18  Payor: Jered Aiken / Plan: [employer] HUMANA MEDICARE CHOICE PPO/PFFS / Product Type: P2P-[employer] Medicare /      NAME/AGE/GENDER: Darlene Decker is a 80 y.o. male          PRIMARY DIAGNOSIS: Abdominal aortic aneurysm (AAA) without rupture ([facility] [address] [I71.4] CAP (community acquired pneumonia) CAP (community acquired pneumonia)  Procedure(s) (LRB):  AORTIC ABDOMINAL ANEURYSM REPAIR ENDOVASCULAR (EVAR) WITH  BILATERAL CUT-DOWNS  (N/A)  2 Days Post-Op  ICD-10: Treatment Diagnosis:       · Generalized Muscle Weakness (M62.81)  · Difficulty in walking, Not elsewhere classified (R26.2)   Precaution/Allergies:  Review of patient's allergies indicates no known allergies. ASSESSMENT:      Mr. Justyn Rees is now s/p AAA repair.   Its amazing that 8/14 he ambulated 450' because today he barely made it to the bathroom and back with mod assist.  He continues to shuffle and his left leg is painful. This is not new. Has been a problem since admission. But it really limited him today. He is vague about the pain. But pointed to his knee suggesting arthritis. He is incontinent so a brief is a must.  He must be on a posey pad as he is impulsive and will get up without asking for assist.  Mr. Sonia Schilling lives by himself at baseline and is currently functioning well below baseline therefore is appropriate for skilled PT to maximize his rehab potential.  Will likely need a post acute rehab stay prior to returning home. Re assessed goals above and any goals that had been met are now active again. This section established at most recent assessment   PROBLEM LIST (Impairments causing functional limitations):  1. Decreased Strength  2. Decreased ADL/Functional Activities  3. Decreased Transfer Abilities  4. Decreased Ambulation Ability/Technique  5. Decreased Balance  6. Decreased Activity Tolerance  7. Decreased Pacing Skills  8. Increased Fatigue  9. Increased Shortness of Breath  10. Decreased [place] with Home Exercise Program    INTERVENTIONS PLANNED: (Benefits and precautions of physical therapy have been discussed with the patient.)  1. Balance Exercise  2. Bed Mobility  3. Family Education  4. Gait Training  5. Home Exercise Program (HEP)  6. Neuromuscular Re-education/Strengthening  7. Range of Motion (ROM)  8. Therapeutic Activites  9. Therapeutic Exercise/Strengthening  10. Transfer Training  11. Group Therapy      TREATMENT PLAN: Frequency/Duration: 3 times a week for duration of hospital stay  Rehabilitation Potential For Stated Goals: FAIR      RECOMMENDED REHABILITATION/EQUIPMENT: (at time of discharge pending progress): Continue Skilled Therapy and Rehab.                    HISTORY:   History of Present Injury/Illness (Reason for Referral):  See H&P below  81 y/o male with history of HTN, hypothyroidism, \"renal disorder\" and dementia is sent in transfer from [address] [place] [address] with diagnosis of UTI, fever, worsening renal failure, hypoxemia and URI/pna. He is alert but a poor historian. His main complaint is of shivering. No family or visitors present to give more history. He states he lives with family. Workup at [address] showed a UTI, temp 100.7, O2 sat 90% RA with PaO2 69. Cr up to 1.8 from 1.3. WBC ct 13.1, lactic acid 1.5 and . Chest xray was reported as normal. Do not know what patient's mental status baseline is to know if he is having delerium from acute illness or not. He was given IVF, rocephin and azithromycin and transferred to [address] [place] [address]. Patient denies chest pain, shortness of breath, nausea or vomiting. Has has malaise, weakness, cough and congestion. Past Medical History/Comorbidities:   Mr. Charan Moreno  has a past medical history of CAD (coronary artery disease). Mr. Charan Moreno  has a past surgical history that includes cardiac surg procedure unlist.  Social History/Living Environment:   Home Environment: Private residence  # Steps to Enter: 6  Rails to Enter: Yes  Hand Rails : Bilateral  One/Two Story Residence: One story  Living Alone: Yes  Support Systems: Child(jessica)  Patient Expects to be Discharged to[de-identified] Private residence  Current DME Used/Available at Home: Liana Love, rollator, Gil Roberto, straight, Shower chair  Tub or Shower Type: Tub/Shower combination  Prior Level of Function/Work/Activity:  Lives alone, use of rollator for gait, indep with ADLs, 0 falls.     Number of Personal Factors/Comorbidities that affect the Plan of Care: 1-2: MODERATE COMPLEXITY   EXAMINATION:   Most Recent Physical Functioning:   Gross Assessment:                  Posture:     Balance:  Sitting: Impaired  Sitting - Static: Fair (occasional)  Sitting - Dynamic: Fair (occasional)  Standing - Static: Fair  Standing - Dynamic : Poor Bed Mobility:     Wheelchair Mobility:     Transfers:  Sit to Stand: Contact guard assistance  Stand to Sit: Contact guard assistance  Gait:     Base of Support: Narrowed  Speed/[place]ni: Slow;Shuffled  Step Length: Right shortened;Left shortened  Gait Abnormalities: Shuffling gait  Distance (ft): 10 Feet (ft) (x 2)  Ambulation - Level of Assistance: Minimal assistance; Moderate assistance       Body Structures Involved:  1. Heart  2. Lungs  3. Bones  4. Joints  5. Muscles Body Functions Affected:  1. Sensory/Pain  2. Cardio  3. Respiratory  4. Neuromusculoskeletal  5. Movement Related Activities and Participation Affected:  1. General Tasks and Demands  2. Mobility  3. Self Care  4. Domestic Life  5. Interpersonal Interactions and Relationships  6. Community, Social and [place]   Number of elements that affect the Plan of Care: 4+: HIGH COMPLEXITY   CLINICAL PRESENTATION:   Presentation: Evolving clinical presentation with changing clinical characteristics: MODERATE COMPLEXITY   CLINICAL DECISION MAKIN [facility] Inpatient Short Form  How much difficulty does the patient currently have. .. Unable A Lot A Little None   1. Turning over in bed (including adjusting bedclothes, sheets and blankets)? [ ] 1   [ ] 2   [X] 3   [ ] 4   2. Sitting down on and standing up from a chair with arms ( e.g., wheelchair, bedside commode, etc.)   [ ] 1   [X] 2   [ ] 3   [ ] 4   3. Moving from lying on back to sitting on the side of the bed? [ ] 1   [ ] 2   [X] 3   [ ] 4   How much help from another person does the patient currently need. .. Total A Lot A Little None   4. Moving to and from a bed to a chair (including a wheelchair)? [ ] 1   [X] 2   [ ] 3   [ ] 4   5. Need to walk in hospital room? [ ] 1   [X] 2   [ ] 3   [ ] 4   6. Climbing 3-5 steps with a railing? [X] 1   [ ] 2   [ ] 3   [ ] 4   © , Trustees of [address] [place] [address] [address] [address], under license to [employer].  All rights reserved    Score:  Initial: 13 Most Recent: X (Date: -- )     Interpretation of Tool:  Represents activities that are increasingly more difficult (i.e. Bed mobility, Transfers, Gait).       Score 24 23 22-20 19-15 14-10 9-7 6       Modifier CH CI CJ CK CL CM CN         · Mobility - Walking and Moving Around:               - CURRENT STATUS:    CL - 60%-79% impaired, limited or restricted               - GOAL STATUS:           CK - 40%-59% impaired, limited or restricted               - D/C STATUS:                       ---------------To be determined---------------  Payor: HUMANA MEDICARE / Plan: [facility] HUMANA MEDICARE CHOICE PPO/PFFS / Product Type: Managed Care Medicare /       Medical Necessity:     · Patient is expected to demonstrate progress in strength, range of motion, balance, coordination and functional technique to decrease assistance required with gait, transfers, and functional mobility. Reason for Services/Other Comments:  · Patient continues to require skilled intervention due to decreased strength, decreased balance, decreased functional tolerance, decreased cardiopulmonary endurance affecting participation in basic ADLs and functional tasks. Use of outcome tool(s) and clinical judgement create a POC that gives a: Questionable prediction of patient's progress: MODERATE COMPLEXITY                 TREATMENT:   (In addition to Assessment/Re-Assessment sessions the following treatments were rendered)   Pre-treatment Symptoms/Complaints: frequently needs to use the bathroom and doesn't always go when he gets there. Pain: Initial:   Pain Intensity 1: 4  Pain Location 1: Knee  Pain Orientation 1: Left  Pain Intervention(s) 1: Emotional support  Post Session:   [facility] when not weightbearing      Assessment/Reassessment only, no treatment provided today        Braces/Orthotics/Lines/Etc:   · IV  Treatment/Session Assessment:    · Response to Treatment:  See above  · Interdisciplinary Collaboration:  · Physical Therapy Assistant and Registered Nurse  · After treatment position/precautions:  · Up in chair, Bed alarm/tab alert on, Bed/Chair-wheels locked, Call light within reach and RN notified  · Compliance with Program/Exercises: Will assess as treatment progresses. · Recommendations/Intent for next treatment session: \"Next visit will focus on advancements to more challenging activities and reduction in assistance provided\".   Total Treatment Duration  PT Patient Time In/Time Out  Time In: 1150  Time Out: [address], PT

## 2017-08-18 NOTE — PROGRESS NOTES
Problem: Self Care Deficits Care Plan (Adult)  Goal: *Acute Goals and Plan of Care (Insert Text)  1. Patient will complete lower body bathing and dressing with modified independence and adaptive equipment as needed. Continue  2. Patient will complete toileting with modified independence. Continue  3. Patient will tolerate 23 minutes of OT treatment with less than 3 rest breaks to increase activity tolerance for ADLs. Continue   Revised    Revised Goals (08/18/2017):  5. Patient will verbalize understanding of safety awareness. 6. Patient will complete functional transfers with modified independence demonstrating good safety awareness and adaptive equipment as needed. Timeframe: 7 visits     Comments:       OCCUPATIONAL THERAPY: Re-evaluation 8/18/2017  INPATIENT: Hospital Day: 18  Payor: Denis Lynn / Plan: [employer] HUMANA MEDICARE CHOICE PPO/PFFS / Product Type: Managed Care Medicare /      NAME/AGE/GENDER: Peyton Rankin is a 80 y.o. male          PRIMARY DIAGNOSIS:  Abdominal aortic aneurysm (AAA) without rupture ([facility] [address] [I71.4] CAP (community acquired pneumonia) CAP (community acquired pneumonia)  Procedure(s) (LRB):  AORTIC ABDOMINAL ANEURYSM REPAIR ENDOVASCULAR (EVAR) WITH  BILATERAL CUT-DOWNS  (N/A)  2 Days Post-Op  ICD-10: Treatment Diagnosis:        · Generalized Muscle Weakness (M62.81)   Precautions/Allergies:         Review of patient's allergies indicates no known allergies. ASSESSMENT:      Mr. Niru Giles presents to hospital for above. Pt reports that he lives alone in a [facility], there are 3 KAYLNE with bilateral hand rails. Pt has access to a cane and rollator, but he mostly uses the rollator for functional mobility. He is typically independent/mod I with ADLs/IADLs. Pt is no longer driving. Per chart, pt PMHx includes dementia and reports that he lives with family, so information gathered above may be inaccurate. 8/18/2017: Re-assessment ordered on return from ICU.  Pt presents supine in bed attempting to transfer to the bathroom. He exhibits confusion and is tangled up in the blanket and sheet requiring assistance. Pt Requires moderate assistance for bed mobility, minimal/moderate assistance for ambulation to bathroom with walker with heavy verbal cueing for safety. Pt leaked urine from bed to toilet. Pt requires CGA/minimal assistance for toileting, verbal cueing and setup for grooming, returns to chair with continued heavy verbal cuing for walker placement and overall safety. Mr. Justyn Rees presents with mild decline in functional status-additional goals above; decreased activity tolerance, and decreased independence for self care, functional mobility, and ADL's. Continues to require skilled OT to maximize independence with self care tasks and functional transfers, educate on safety awareness and fall prevention. Pt left sitting in chair with posey in place and all needs in reach, pt instructed to call for assistance; RN aware. This section established at most recent assessment   PROBLEM LIST (Impairments causing functional limitations):  1. Decreased Strength  2. Decreased ADL/Functional Activities  3. Decreased Transfer Abilities  4. Decreased Ambulation Ability/Technique  5. Decreased Balance  6. Decreased Activity Tolerance  7. Decreased Pacing Skills  8. Decreased Work Simplification/Energy Conservation Techniques  9. Increased Fatigue  10. Increased Shortness of Breath  11. Decreased [place] with Home Exercise Program  12. Decreased Cognition    INTERVENTIONS PLANNED: (Benefits and precautions of occupational therapy have been discussed with the patient.)  1. Activities of daily living training  2. Adaptive equipment training  3. Balance training  4. Group therapy  5. Therapeutic activity  6. Therapeutic exercise      TREATMENT PLAN: Frequency/Duration: Follow patient 3x/week to address above goals.   Rehabilitation Potential For Stated Goals: GOOD      RECOMMENDED REHABILITATION/EQUIPMENT: (at time of discharge pending progress): Continue Skilled Therapy. OCCUPATIONAL PROFILE AND HISTORY:   History of Present Injury/Illness (Reason for Referral):  See H&P  Past Medical History/Comorbidities:   Mr. Kyra Schlatter  has a past medical history of CAD (coronary artery disease). Mr. Kyra Schlatter  has a past surgical history that includes cardiac surg procedure unlist.  Social History/Living Environment:   Home Environment: Private residence  # Steps to Enter: 6  Rails to Enter: Yes  Hand Rails : Bilateral  One/Two Story Residence: One story  Living Alone: Yes  Support Systems: Child(jessica)  Patient Expects to be Discharged to[de-identified] Private residence  Current DME Used/Available at Home: Mosetta [place], rollator, Kaci Meline, straight, Shower chair  Tub or Shower Type: Tub/Shower combination  Prior Level of Function/Work/Activity:  [place]/mod I with ADLs/IADLs  Personal Factors:          Sex:  male        Age:  80 y.o. Number of Personal Factors/Comorbidities that affect the Plan of Care: Expanded review of therapy/medical records (1-2):  MODERATE COMPLEXITY   ASSESSMENT OF OCCUPATIONAL PERFORMANCE[de-identified]   Activities of Daily Living:           Basic ADLs (From Assessment) Complex ADLs (From Assessment)   Basic ADL  Feeding: Independent  Oral Facial Hygiene/Grooming: Modified Independent  Bathing: Minimum assistance  Upper Body Dressing: Modified independent  Lower Body Dressing: Moderate assistance  Toileting: Minimum assistance Instrumental ADL  Meal Preparation: Moderate assistance  Homemaking: Maximum assistance  Medication Management: Supervision  Financial Management: Setup   Grooming/Bathing/Dressing Activities of Daily Living     Cognitive Retraining  Safety/Judgement: Decreased insight into deficits; Decreased awareness of need for safety;Decreased awareness of need for assistance; Fall prevention           Toileting  Toileting Assistance: Minimum assistance  Bladder Hygiene: Supervision/set-up  Bowel Hygiene: Minimum assistance  Clothing Management: Minimum assistance  Cues: Verbal cues provided;Visual cues provided  Adaptive Equipment: Grab bars; Walker     Functional Transfers  Toilet Transfer : Moderate assistance;Minimum assistance  Tub Transfer: Maximum assistance     Bed/Mat Mobility  Rolling: Contact guard assistance  Supine to Sit: Minimum assistance  Sit to Stand: Contact guard assistance  Bed to Chair: Moderate assistance;Minimum assistance          Most Recent Physical Functioning:   Gross Assessment:                  Posture:     Balance:  Sitting: Impaired  Sitting - Static: Fair (occasional)  Sitting - Dynamic: Fair (occasional)  Standing: Impaired  Standing - Static: Fair  Standing - Dynamic : Poor Bed Mobility:  Rolling: Contact guard assistance  Supine to Sit: Minimum assistance  Wheelchair Mobility:     Transfers:  Sit to Stand: Contact guard assistance  Stand to Sit: Contact guard assistance  Bed to Chair: Moderate assistance;Minimum assistance                    Patient Vitals for the past 6 hrs:   BP BP Patient Position SpO2 Pulse   08/18/17 1031 - - - 76   08/18/17 1125 146/60 At rest;Lying right side 95 % 88   08/18/17 1139 - - 95 % -   08/18/17 1440 128/69 At rest;Sitting 100 % 75        Mental Status  Neurologic State: Alert  Orientation Level: Oriented to place, Oriented to person  Cognition: Impulsive, Decreased attention/concentration, Decreased command following  Perception: Verbal  Perseveration: No perseveration noted  Safety/Judgement: Decreased insight into deficits, Decreased awareness of need for safety, Decreased awareness of need for assistance, Fall prevention                               Physical Skills Involved:  1. Range of Motion  2. Balance  3. Strength  4. Activity Tolerance  5. Gross Motor Control Cognitive Skills Affected (resulting in the inability to perform in a timely and safe manner):  1. Executive Function  2.  Immediate Memory  3. Short Term Recall  4. Comprehension Psychosocial Skills Affected:  1. Habits/Routines  2. Environmental Adaptation  3. Social Interaction  4. Self-Awareness   Number of elements that affect the Plan of Care: 5+:  HIGH COMPLEXITY   CLINICAL DECISION MAKIN[address] [place] [address] AM-PAC 6 Clicks   Daily Activity Inpatient Short Form  How much help from another person does the patient currently need. .. Total A Lot A Little None   1. Putting on and taking off regular lower body clothing?   [ ] 1   [X] 2   [ ] 3   [ ] 4   2. Bathing (including washing, rinsing, drying)? [ ] 1   [ Adan Ma 2   [ ] 3   [ ] 4   3. Toileting, which includes using toilet, bedpan or urinal?   [ ] 1   [ ] 2   [X] 3   [ ] 4   4. Putting on and taking off regular upper body clothing?   [ ] 1   [ ] 2   [ ] 3   [X] 4   5. Taking care of personal grooming such as brushing teeth? [ ] 1   [ ] 2   Fitch[employer]Roles ] 3   [ ] 4   6. Eating meals? [ ] 1   [ ] 2   [ ] 3   [X] 4   © , Trustees of [address] [place] [address], under license to [employer]. All rights reserved    Score:  Initial: 18 Most Recent: X (Date: -- )     Interpretation of Tool:  Represents activities that are increasingly more difficult (i.e. Bed mobility, Transfers, Gait). Score 24 23 22-20 19-15 14-10 9-7 6       Modifier CH CI CJ CK CL CM CN         · Self Care:               - CURRENT STATUS:    CK - 40%-59% impaired, limited or restricted               - GOAL STATUS:           CI - 1%-19% impaired, limited or restricted               - D/C STATUS:                       ---------------To be determined---------------  Payor: HUMANA MEDICARE / Plan: [facility] HUMANA MEDICARE CHOICE PPO/PFFS / Product Type: Managed Care Medicare /       Medical Necessity:     · Patient is expected to demonstrate progress in strength, balance, coordination, functional technique and activity tolerance to improve safety during ADLs.   Reason for Services/Other Comments:  · Patient continues to require skilled intervention due to medical complications. Use of outcome tool(s) and clinical judgement create a POC that gives a: MODERATE COMPLEXITY             TREATMENT:   (In addition to Assessment/Re-Assessment sessions the following treatments were rendered)      Pre-treatment Symptoms/Complaints:    Pain: Initial:   Pain Intensity 1: 0  Post Session:  same      Therapeutic Activity: (    15 minutes): Therapeutic activities including Bed transfers, Chair transfers and Toilet transfers to improve mobility, strength, balance and coordination. Required minimal   tactile and maximal verbal cueing to promote dynamic balance in standing. Re-Assessment       Braces/Orthotics/Lines/Etc:   · IV  · O2 Device: Nasal cannula  Treatment/Session Assessment:    · Response to Treatment:  eval only. · Interdisciplinary Collaboration:Occupational Therapist, Registered Nurse, Certified Nursing Assistant/Patient Care Technician and Respiratory Therapist  · After treatment position/precautions:  · Up in chair, Bed alarm/tab alert on, Bed/Chair-wheels locked, Call light within reach, RN notified and RT present prior to departure  · Compliance with Program/Exercises: compliant all of the time today. · Recommendations/Intent for next treatment session: \"Next visit will focus on advancements to more challenging activities and reduction in assistance provided\".   Total Treatment Duration: 25 minutes  OT Patient Time In/Time Out  Time In: 1112  Time Out: Elizabeth Mcmanus 17, OTR/L

## 2017-08-18 NOTE — PROGRESS NOTES
TRANSFER - IN REPORT:    Verbal report received from Epi on Ivelisse De La Rosa  being received from ICU for routine progression of care      Report consisted of patients Situation, Background, Assessment and   Recommendations(SBAR). Information from the following report(s) SBAR was reviewed with the receiving nurse. Opportunity for questions and clarification was provided. Assessment completed upon patients arrival to unit and care assumed.

## 2017-08-18 NOTE — PROGRESS NOTES
8/18/2017 5:47 PM    Admit Date: 8/1/2017    Admit Diagnosis: Abdominal aortic aneurysm (AAA) without rupture ([facility] [address] [B81. *      Subjective:   No cp or sob      Objective:      Visit Vitals    /55 (BP 1 Location: Right arm, BP Patient Position: At rest)    Pulse 83    Temp 98.7 °F (37.1 °C)    Resp 16    Ht 5' 4\" (1.626 m)    Wt 67 kg (147 lb 9.6 oz)    SpO2 97%    BMI 25.34 kg/m2       Physical Exam:  General-Well Developed, Well Nourished, No Acute Distress, Alert & Oriented x 3, appropriate mood. Neck- supple, no JVD  CV- regular rate and rhythm no MRG  Lung- clear bilaterally  Abd- soft, nontender, nondistended  Ext- no edema bilaterally. Skin- warm and dry        Data Review:   Recent Labs      08/18/17   0601  08/16/17   0624   NA  141  143   K  3.9  3.9   BUN  26*  24*   CREA  1.50  1.37   WBC  13.9*  9.9   HGB  9.3*  11.8*   HCT  28.1*  35.3*   PLT  273  345   INR   --   1.0       Assessment/Plan:     Principal Problem:    CAP (community acquired pneumonia) (8/1/2017)Stable. Continue current medical therapy. Active Problems:    Altered mental status (8/1/2017)  CAD/Chronic diastolic chf- Stable. Continue current medical therapy. Limit ivfs      UTI (urinary tract infection) (8/1/2017)    Fever (8/1/2017)      HCAP (healthcare-associated pneumonia) (8/11/2017)        No changes call if needed

## 2017-08-18 NOTE — PROGRESS NOTES
TRANSFER - OUT REPORT:    Verbal report given to Nicanor Grullon RN (name) on Wale Ruiz  being transferred to [phone] (unit) for routine progression of care       Report consisted of patients Situation, Background, Assessment and   Recommendations(SBAR). Information from the following report(s) SBAR, Kardex, Intake/Output, MAR and Cardiac Rhythm NSR was reviewed with the receiving nurse. Opportunity for questions and clarification was provided.       Patient transported with:   Registered Nurse

## 2017-08-19 LAB
ANION GAP SERPL CALC-SCNC: 7 MMOL/L (ref 7–16)
ATRIAL RATE: 96 BPM
BASOPHILS # BLD: 0 K/UL (ref 0–0.2)
BASOPHILS NFR BLD: 0 % (ref 0–2)
BUN SERPL-MCNC: 23 MG/DL (ref 8–23)
CALCIUM SERPL-MCNC: 8.3 MG/DL (ref 8.3–10.4)
CALCULATED R AXIS, ECG10: 41 DEGREES
CALCULATED T AXIS, ECG11: 15 DEGREES
CHLORIDE SERPL-SCNC: 109 MMOL/L (ref 98–107)
CO2 SERPL-SCNC: 27 MMOL/L (ref 21–32)
CREAT SERPL-MCNC: 1.36 MG/DL (ref 0.8–1.5)
DIAGNOSIS, 93000: NORMAL
DIFFERENTIAL METHOD BLD: ABNORMAL
EOSINOPHIL # BLD: 0.1 K/UL (ref 0–0.8)
EOSINOPHIL NFR BLD: 1 % (ref 0.5–7.8)
ERYTHROCYTE [DISTWIDTH] IN BLOOD BY AUTOMATED COUNT: 15.3 % (ref 11.9–14.6)
GLUCOSE SERPL-MCNC: 106 MG/DL (ref 65–100)
HCT VFR BLD AUTO: 24.6 % (ref 41.1–50.3)
HGB BLD-MCNC: 8.2 G/DL (ref 13.6–17.2)
IMM GRANULOCYTES # BLD: 0 K/UL (ref 0–0.5)
IMM GRANULOCYTES NFR BLD: 0.3 % (ref 0–5)
LYMPHOCYTES # BLD: 1.6 K/UL (ref 0.5–4.6)
LYMPHOCYTES NFR BLD: 15 % (ref 13–44)
MAGNESIUM SERPL-MCNC: 2.3 MG/DL (ref 1.8–2.4)
MCH RBC QN AUTO: 31.2 PG (ref 26.1–32.9)
MCHC RBC AUTO-ENTMCNC: 33.3 G/DL (ref 31.4–35)
MCV RBC AUTO: 93.5 FL (ref 79.6–97.8)
MONOCYTES # BLD: 1.2 K/UL (ref 0.1–1.3)
MONOCYTES NFR BLD: 11 % (ref 4–12)
NEUTS SEG # BLD: 8 K/UL (ref 1.7–8.2)
NEUTS SEG NFR BLD: 73 % (ref 43–78)
PLATELET # BLD AUTO: 208 K/UL (ref 150–450)
PMV BLD AUTO: 10.9 FL (ref 10.8–14.1)
POTASSIUM SERPL-SCNC: 3.5 MMOL/L (ref 3.5–5.1)
Q-T INTERVAL, ECG07: 370 MS
QRS DURATION, ECG06: 82 MS
QTC CALCULATION (BEZET), ECG08: 460 MS
RBC # BLD AUTO: 2.63 M/UL (ref 4.23–5.67)
SODIUM SERPL-SCNC: 143 MMOL/L (ref 136–145)
VENTRICULAR RATE, ECG03: 93 BPM
WBC # BLD AUTO: 10.9 K/UL (ref 4.3–11.1)

## 2017-08-19 PROCEDURE — 94760 N-INVAS EAR/PLS OXIMETRY 1: CPT

## 2017-08-19 PROCEDURE — 83735 ASSAY OF MAGNESIUM: CPT | Performed by: SURGERY

## 2017-08-19 PROCEDURE — 74011250637 HC RX REV CODE- 250/637: Performed by: INTERNAL MEDICINE

## 2017-08-19 PROCEDURE — 74011250637 HC RX REV CODE- 250/637: Performed by: HOSPITALIST

## 2017-08-19 PROCEDURE — 74011250636 HC RX REV CODE- 250/636: Performed by: HOSPITALIST

## 2017-08-19 PROCEDURE — 94640 AIRWAY INHALATION TREATMENT: CPT

## 2017-08-19 PROCEDURE — 74011000250 HC RX REV CODE- 250: Performed by: NURSE PRACTITIONER

## 2017-08-19 PROCEDURE — 80048 BASIC METABOLIC PNL TOTAL CA: CPT | Performed by: SURGERY

## 2017-08-19 PROCEDURE — 36415 COLL VENOUS BLD VENIPUNCTURE: CPT | Performed by: SURGERY

## 2017-08-19 PROCEDURE — 65660000000 HC RM CCU STEPDOWN

## 2017-08-19 PROCEDURE — 93005 ELECTROCARDIOGRAM TRACING: CPT | Performed by: HOSPITALIST

## 2017-08-19 PROCEDURE — 85025 COMPLETE CBC W/AUTO DIFF WBC: CPT | Performed by: SURGERY

## 2017-08-19 RX ADMIN — BACLOFEN 10 MG: 10 TABLET ORAL at 09:59

## 2017-08-19 RX ADMIN — ENOXAPARIN SODIUM 30 MG: 30 INJECTION SUBCUTANEOUS at 14:03

## 2017-08-19 RX ADMIN — IPRATROPIUM BROMIDE AND ALBUTEROL SULFATE 3 ML: 2.5; .5 SOLUTION RESPIRATORY (INHALATION) at 19:54

## 2017-08-19 RX ADMIN — BUDESONIDE 500 MCG: 0.5 SUSPENSION RESPIRATORY (INHALATION) at 08:09

## 2017-08-19 RX ADMIN — METOPROLOL TARTRATE 25 MG: 25 TABLET ORAL at 09:59

## 2017-08-19 RX ADMIN — IPRATROPIUM BROMIDE AND ALBUTEROL SULFATE 3 ML: 2.5; .5 SOLUTION RESPIRATORY (INHALATION) at 04:32

## 2017-08-19 RX ADMIN — PANTOPRAZOLE SODIUM 40 MG: 40 TABLET, DELAYED RELEASE ORAL at 05:43

## 2017-08-19 RX ADMIN — DOCUSATE SODIUM 100 MG: 100 CAPSULE, LIQUID FILLED ORAL at 09:59

## 2017-08-19 RX ADMIN — IPRATROPIUM BROMIDE AND ALBUTEROL SULFATE 3 ML: 2.5; .5 SOLUTION RESPIRATORY (INHALATION) at 16:02

## 2017-08-19 RX ADMIN — HYDRALAZINE HYDROCHLORIDE 50 MG: 50 TABLET ORAL at 15:28

## 2017-08-19 RX ADMIN — HYDRALAZINE HYDROCHLORIDE 50 MG: 50 TABLET ORAL at 21:21

## 2017-08-19 RX ADMIN — LEVOTHYROXINE SODIUM 100 MCG: 100 TABLET ORAL at 07:10

## 2017-08-19 RX ADMIN — Medication 10 ML: at 14:05

## 2017-08-19 RX ADMIN — Medication 10 ML: at 21:22

## 2017-08-19 RX ADMIN — FINASTERIDE 5 MG: 5 TABLET, FILM COATED ORAL at 09:59

## 2017-08-19 RX ADMIN — ASPIRIN 81 MG: 81 TABLET, COATED ORAL at 09:59

## 2017-08-19 RX ADMIN — IPRATROPIUM BROMIDE AND ALBUTEROL SULFATE 3 ML: 2.5; .5 SOLUTION RESPIRATORY (INHALATION) at 08:09

## 2017-08-19 RX ADMIN — IPRATROPIUM BROMIDE AND ALBUTEROL SULFATE 3 ML: 2.5; .5 SOLUTION RESPIRATORY (INHALATION) at 23:18

## 2017-08-19 RX ADMIN — ATORVASTATIN CALCIUM 40 MG: 40 TABLET, FILM COATED ORAL at 21:22

## 2017-08-19 RX ADMIN — Medication 10 ML: at 05:44

## 2017-08-19 RX ADMIN — BACLOFEN 10 MG: 10 TABLET ORAL at 17:46

## 2017-08-19 RX ADMIN — DOCUSATE SODIUM 100 MG: 100 CAPSULE, LIQUID FILLED ORAL at 17:46

## 2017-08-19 RX ADMIN — METOPROLOL TARTRATE 25 MG: 25 TABLET ORAL at 21:21

## 2017-08-19 RX ADMIN — SODIUM CHLORIDE 500 ML: 900 INJECTION, SOLUTION INTRAVENOUS at 08:00

## 2017-08-19 RX ADMIN — FERROUS SULFATE TAB 325 MG (65 MG ELEMENTAL FE) 325 MG: 325 (65 FE) TAB at 07:10

## 2017-08-19 RX ADMIN — HYDRALAZINE HYDROCHLORIDE 50 MG: 50 TABLET ORAL at 09:59

## 2017-08-19 RX ADMIN — BUDESONIDE 500 MCG: 0.5 SUSPENSION RESPIRATORY (INHALATION) at 19:54

## 2017-08-19 NOTE — PROGRESS NOTES
Hospitalist Progress Note    2017  Admit Date: 2017 12:17 AM   NAME: Janine Pabon   :  10/18/1931   MRN:  [id number]   Attending: Greg White MD  PCP:  None    SUBJECTIVE:   As previously documented:  'Janine Pabon is a 89NNT with HTN, CKD, dementia, hypothyroidism who was admitted on  with UTI, JEB, hypoxia, and pneumonia. Started on abx, has been improving. Has had increased leukocytosis. ID was consulted and abx stopped, leukocytosis attributed to steroids. US found 4cm AAA and found to have high grade L renal artery stenosis. Had AAA yesterday, with post-op care in the ICU. Has been cleared by vascular for transfer to the floor. He is on RA, sitting in chair. Doing well. Denies post-op pain, SOB, CP, nausea.'    17-   Seen at bedside  Denies any complaints  No chest pain/SOB/abdominal pain/fever/urinary urgency or frequency      Review of Systems negative with exception of pertinent positives noted above  PHYSICAL EXAM     Visit Vitals    /61 (BP 1 Location: Left arm, BP Patient Position: At rest)    Pulse 99    Temp 97.8 °F (36.6 °C)    Resp 20    Ht 5' 4\" (1.626 m)    Wt 67 kg (147 lb 9.6 oz)    SpO2 94%    BMI 25.34 kg/m2      Temp (24hrs), Av.4 °F (36.9 °C), Min:97.8 °F (36.6 °C), Max:99.1 °F (37.3 °C)    Oxygen Therapy  O2 Sat (%): 94 % (17 0443)  Pulse via Oximetry: 95 beats per minute (17 043)  O2 Device: Room air (17)  O2 Flow Rate (L/min): 0 l/min (17 151)  FIO2 (%): 21 % (17)    Intake/Output Summary (Last 24 hours) at 17 0752  Last data filed at 17 0035   Gross per 24 hour   Intake                0 ml   Output              200 ml   Net             -200 ml      General: No acute distress, alert and oriented to self and year    Lungs:  CTA Bilaterally.    Heart:  Regular rate and rhythm,  No murmur, rub, or gallop  Abdomen: Soft, Non distended, Non tender, Positive bowel sounds  Extremities: No cyanosis, clubbing or edema, femoral surgical incision sites covered  Neurologic:  gcs 15, no motor or sensory deficits, CN 2-12 intact, cerebellar functions intact  Psych:             AO x 3, mood and affect appropriate    Recent Results (from the past 24 hour(s))   CULTURE, URINE    Collection Time: 08/18/17 10:00 PM   Result Value Ref Range    Special Requests: NO SPECIAL REQUESTS      Culture result:        NO GROWTH AFTER SHORT PERIOD OF INCUBATION. FURTHER RESULTS TO FOLLOW AFTER OVERNIGHT INCUBATION. CBC WITH AUTOMATED DIFF    Collection Time: 08/19/17  6:56 AM   Result Value Ref Range    WBC 10.9 4.3 - 11.1 K/uL    RBC 2.63 (L) 4.23 - 5.67 M/uL    HGB 8.2 (L) 13.6 - 17.2 g/dL    HCT 24.6 (L) 41.1 - 50.3 %    MCV 93.5 79.6 - 97.8 FL    MCH 31.2 26.1 - 32.9 PG    MCHC 33.3 31.4 - 35.0 g/dL    RDW 15.3 (H) 11.9 - 14.6 %    PLATELET 589 918 - 967 K/uL    MPV 10.9 10.8 - 14.1 FL    DF AUTOMATED      NEUTROPHILS 73 43 - 78 %    LYMPHOCYTES 15 13 - 44 %    MONOCYTES 11 4.0 - 12.0 %    EOSINOPHILS 1 0.5 - 7.8 %    BASOPHILS 0 0.0 - 2.0 %    IMMATURE GRANULOCYTES 0.3 0.0 - 5.0 %    ABS. NEUTROPHILS 8.0 1.7 - 8.2 K/UL    ABS. LYMPHOCYTES 1.6 0.5 - 4.6 K/UL    ABS. MONOCYTES 1.2 0.1 - 1.3 K/UL    ABS. EOSINOPHILS 0.1 0.0 - 0.8 K/UL    ABS. BASOPHILS 0.0 0.0 - 0.2 K/UL    ABS. IMM. GRANS. 0.0 0.0 - 0.5 K/UL     IR FLUOROSCOPY < 1 HOUR   Final Result      XR CHEST PORT   Final Result   IMPRESSION: Interstitial prominence which may represent edema. Small patchy   density partially obscures right hemidiaphragm. There are sternal wires. CTA ABD ART W RUNOFF W WO CONT   Final Result   IMPRESSION:   1. Infrarenal abdominal aortic aneurysm with saccular component measuring 4.0, x   6.7 cm. 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 2. High-grade left renal artery stenosis.    3. Right lower extremity: Moderate stenosis of the common femoral artery and   diffuse high-grade disease of SFA.   4. Left lower extremity: Occlusion of SFA reconstitution at the popliteal artery   level. 5. Bilateral pleural effusions. Cholelithiasis. Recommend vascular surgical consultation. DC4   The significant findings in this report have been referred to the Imaging   Navigator in order to communicate to the referring provider or his/her designee   as outlined in Section II.C.2.a.ii-iii of the ACR   Practice Guideline for Communication of Diagnostic Imaging Findings. .      XR CHEST SNGL V   Final Result   IMPRESSION:    1.  Stable cardiomegaly without evolving acute changes seen. US RETROPERITONEUM COMP   Final Result   IMPRESSION:      1) A 4.1 cm abdominal aortic aneurysm with peripheral thrombus. 2) Negative for hydronephrosis. Abnormally increased renal echogenicity. 3) Simple renal cysts. ASSESSMENT      Active Hospital Problems    Diagnosis Date Noted    Abdominal aortic aneurysm (AAA) without rupture ([facility] [address] 08/18/2017    Enterococcus UTI 08/18/2017    Altered mental status 08/01/2017    Fever 08/01/2017     Plan:  · AAA- s/p repair on 8/16/17. · Enterococcus UTI- s/p treatment with ampicililn/zosyn/vanc prior to surgery. · Altered mental status- resolved, at baseline now  · Leukocytosis- resolved, likely post op inflammation. · Post-op acute blood loss anemia- mild. Monitor. DVT Prophylaxis: Lovenox    Dispo- hopefully to rehab soon pending insurance pre-cert.     Signed By: Sonia Gallego MD     August 19, 2017

## 2017-08-19 NOTE — PROGRESS NOTES
Pt fell the beginning of the shift and was move from room 630 to 637. Pt was confused and kept getting up all night.

## 2017-08-19 NOTE — PROGRESS NOTES
[facility]   [address], [address] [place] [address]. . k 125 FAX: [phone]          VASCULAR SURGERY FLOOR PROGRESS NOTE    Admit Date: 2017  POD: 3 Days Post-Op    Procedure:  Procedure(s):  AORTIC ABDOMINAL ANEURYSM REPAIR ENDOVASCULAR (EVAR) WITH  BILATERAL CUT-DOWNS     Subjective:     Patient has no complaints. ROS - unchanged except as noted above. Objective:     Vitals:  Blood pressure 120/54, pulse 84, temperature 98.8 °F (37.1 °C), resp. rate 16, height 5' 4\" (1.626 m), weight 147 lb 9.6 oz (67 kg), SpO2 95 %. Temp (24hrs), Av.4 °F (36.9 °C), Min:97.8 °F (36.6 °C), Max:99 °F (37.2 °C)      Intake / Output:    Intake/Output Summary (Last 24 hours) at 17 1245  Last data filed at 17 1145   Gross per 24 hour   Intake                0 ml   Output              450 ml   Net             -450 ml       Physical Exam:    GEN: alert, cooperative, no distress  Incision:  Groin incisions are covered with clean dressings. No evidence of hematoma. Both feet are warm and without ischemic changes. Labs:   Recent Labs      17   0656  17   0601   HGB  8.2*  9.3*   WBC  10.9  13.9*   K  3.5  3.9   GLU  106*  113*       Data Review reviewed  Consultants documentation and Nursing documentation    Assessment:     Patient Active Problem List    Diagnosis Date Noted    Abdominal aortic aneurysm (AAA) without rupture ([facility] [address] 2017    Enterococcus UTI 2017    Altered mental status 2017    Fever 2017       Plan/Recommendations/Medical Decision Making:     Continue present treatment    Elements of this note have been dictated using speech recognition software. As a result, errors of speech recognition may have occurred.

## 2017-08-19 NOTE — PROGRESS NOTES
Pt is resting and calm. Urine specimen were send day. Hourly rounds were done and pt needs were met. Report was given to day shift nurse and will continue to monitor.

## 2017-08-19 NOTE — PROGRESS NOTES
Called in view of new onset a.fib/a.flutter on remote telemetry. 12 lead EKG showed A.fib with PVC with ventricular rate of 93. Continue beta blocker. [id number] score is 4, pt should be on [facility] given new onset of atrial fib. Will appreciate cardiology to address. However, given recent abdominal surgery, risk of bleeding is high.

## 2017-08-20 ENCOUNTER — APPOINTMENT (OUTPATIENT)
Dept: GENERAL RADIOLOGY | Age: 82
DRG: 853 | End: 2017-08-20
Attending: HOSPITALIST
Payer: MEDICARE

## 2017-08-20 LAB
ANION GAP SERPL CALC-SCNC: 8 MMOL/L (ref 7–16)
BUN SERPL-MCNC: 21 MG/DL (ref 8–23)
CALCIUM SERPL-MCNC: 8 MG/DL (ref 8.3–10.4)
CHLORIDE SERPL-SCNC: 108 MMOL/L (ref 98–107)
CO2 SERPL-SCNC: 27 MMOL/L (ref 21–32)
COLLECTION COMMENT, COLCM: NORMAL
CREAT SERPL-MCNC: 1.35 MG/DL (ref 0.8–1.5)
ERYTHROCYTE [DISTWIDTH] IN BLOOD BY AUTOMATED COUNT: 15.4 % (ref 11.9–14.6)
GLUCOSE SERPL-MCNC: 117 MG/DL (ref 65–100)
HCT VFR BLD AUTO: 24.2 % (ref 41.1–50.3)
HGB BLD-MCNC: 8.1 G/DL (ref 13.6–17.2)
MAGNESIUM SERPL-MCNC: 2.1 MG/DL (ref 1.8–2.4)
MCH RBC QN AUTO: 31.5 PG (ref 26.1–32.9)
MCHC RBC AUTO-ENTMCNC: 33.5 G/DL (ref 31.4–35)
MCV RBC AUTO: 94.2 FL (ref 79.6–97.8)
PLATELET # BLD AUTO: 206 K/UL (ref 150–450)
PMV BLD AUTO: 11.3 FL (ref 10.8–14.1)
POTASSIUM SERPL-SCNC: 3.6 MMOL/L (ref 3.5–5.1)
RBC # BLD AUTO: 2.57 M/UL (ref 4.23–5.67)
SODIUM SERPL-SCNC: 143 MMOL/L (ref 136–145)
WBC # BLD AUTO: 10.5 K/UL (ref 4.3–11.1)

## 2017-08-20 PROCEDURE — 83735 ASSAY OF MAGNESIUM: CPT | Performed by: SURGERY

## 2017-08-20 PROCEDURE — 97110 THERAPEUTIC EXERCISES: CPT

## 2017-08-20 PROCEDURE — 97530 THERAPEUTIC ACTIVITIES: CPT

## 2017-08-20 PROCEDURE — 85027 COMPLETE CBC AUTOMATED: CPT | Performed by: HOSPITALIST

## 2017-08-20 PROCEDURE — 74011250637 HC RX REV CODE- 250/637: Performed by: INTERNAL MEDICINE

## 2017-08-20 PROCEDURE — 74011250636 HC RX REV CODE- 250/636: Performed by: HOSPITALIST

## 2017-08-20 PROCEDURE — 73562 X-RAY EXAM OF KNEE 3: CPT

## 2017-08-20 PROCEDURE — 82272 OCCULT BLD FECES 1-3 TESTS: CPT | Performed by: HOSPITALIST

## 2017-08-20 PROCEDURE — 80048 BASIC METABOLIC PNL TOTAL CA: CPT | Performed by: SURGERY

## 2017-08-20 PROCEDURE — 73030 X-RAY EXAM OF SHOULDER: CPT

## 2017-08-20 PROCEDURE — 94760 N-INVAS EAR/PLS OXIMETRY 1: CPT

## 2017-08-20 PROCEDURE — 36415 COLL VENOUS BLD VENIPUNCTURE: CPT | Performed by: SURGERY

## 2017-08-20 PROCEDURE — 74011250637 HC RX REV CODE- 250/637: Performed by: HOSPITALIST

## 2017-08-20 PROCEDURE — 94640 AIRWAY INHALATION TREATMENT: CPT

## 2017-08-20 PROCEDURE — 74011000250 HC RX REV CODE- 250: Performed by: NURSE PRACTITIONER

## 2017-08-20 PROCEDURE — 65660000000 HC RM CCU STEPDOWN

## 2017-08-20 PROCEDURE — 93005 ELECTROCARDIOGRAM TRACING: CPT | Performed by: HOSPITALIST

## 2017-08-20 RX ADMIN — PANTOPRAZOLE SODIUM 40 MG: 40 TABLET, DELAYED RELEASE ORAL at 05:39

## 2017-08-20 RX ADMIN — FINASTERIDE 5 MG: 5 TABLET, FILM COATED ORAL at 08:16

## 2017-08-20 RX ADMIN — IPRATROPIUM BROMIDE AND ALBUTEROL SULFATE 3 ML: 2.5; .5 SOLUTION RESPIRATORY (INHALATION) at 20:15

## 2017-08-20 RX ADMIN — METOPROLOL TARTRATE 25 MG: 25 TABLET ORAL at 20:52

## 2017-08-20 RX ADMIN — IPRATROPIUM BROMIDE AND ALBUTEROL SULFATE 3 ML: 2.5; .5 SOLUTION RESPIRATORY (INHALATION) at 04:35

## 2017-08-20 RX ADMIN — IPRATROPIUM BROMIDE AND ALBUTEROL SULFATE 3 ML: 2.5; .5 SOLUTION RESPIRATORY (INHALATION) at 07:25

## 2017-08-20 RX ADMIN — LEVOTHYROXINE SODIUM 100 MCG: 100 TABLET ORAL at 05:39

## 2017-08-20 RX ADMIN — DOCUSATE SODIUM 100 MG: 100 CAPSULE, LIQUID FILLED ORAL at 17:03

## 2017-08-20 RX ADMIN — BACLOFEN 10 MG: 10 TABLET ORAL at 17:03

## 2017-08-20 RX ADMIN — ASPIRIN 81 MG: 81 TABLET, COATED ORAL at 08:16

## 2017-08-20 RX ADMIN — ACETAMINOPHEN 650 MG: 325 TABLET ORAL at 22:48

## 2017-08-20 RX ADMIN — BUDESONIDE 500 MCG: 0.5 SUSPENSION RESPIRATORY (INHALATION) at 07:25

## 2017-08-20 RX ADMIN — Medication 10 ML: at 17:04

## 2017-08-20 RX ADMIN — ENOXAPARIN SODIUM 30 MG: 30 INJECTION SUBCUTANEOUS at 14:49

## 2017-08-20 RX ADMIN — HYDRALAZINE HYDROCHLORIDE 50 MG: 50 TABLET ORAL at 20:53

## 2017-08-20 RX ADMIN — FERROUS SULFATE TAB 325 MG (65 MG ELEMENTAL FE) 325 MG: 325 (65 FE) TAB at 05:45

## 2017-08-20 RX ADMIN — HYDRALAZINE HYDROCHLORIDE 50 MG: 50 TABLET ORAL at 17:03

## 2017-08-20 RX ADMIN — BACLOFEN 10 MG: 10 TABLET ORAL at 08:16

## 2017-08-20 RX ADMIN — Medication 10 ML: at 20:53

## 2017-08-20 RX ADMIN — ATORVASTATIN CALCIUM 40 MG: 40 TABLET, FILM COATED ORAL at 20:52

## 2017-08-20 RX ADMIN — HYDRALAZINE HYDROCHLORIDE 50 MG: 50 TABLET ORAL at 08:16

## 2017-08-20 RX ADMIN — METOPROLOL TARTRATE 25 MG: 25 TABLET ORAL at 08:16

## 2017-08-20 RX ADMIN — IPRATROPIUM BROMIDE AND ALBUTEROL SULFATE 3 ML: 2.5; .5 SOLUTION RESPIRATORY (INHALATION) at 11:31

## 2017-08-20 RX ADMIN — DOCUSATE SODIUM 100 MG: 100 CAPSULE, LIQUID FILLED ORAL at 08:16

## 2017-08-20 RX ADMIN — BUDESONIDE 500 MCG: 0.5 SUSPENSION RESPIRATORY (INHALATION) at 20:15

## 2017-08-20 RX ADMIN — Medication 10 ML: at 05:39

## 2017-08-20 NOTE — PROGRESS NOTES
Report to Reina Hill RN. Hourly rounds this shift. OOB to chair this shift. Patient alarm did go off once, pt had gotten up by himself and assisted back to bed, no further episodes after reminded to call for assistance.

## 2017-08-20 NOTE — PROGRESS NOTES
[place] [address]   [address], [address] [facility]. . k 125 FAX: [phone]          VASCULAR SURGERY FLOOR PROGRESS NOTE    Admit Date: 2017  POD: 4 Days Post-Op    Procedure:  Procedure(s):  AORTIC ABDOMINAL ANEURYSM REPAIR ENDOVASCULAR (EVAR) WITH  BILATERAL CUT-DOWNS     Subjective:     Patient has no complaints. ROS - unchanged except as noted above. Objective:     Vitals:  Blood pressure 132/64, pulse 72, temperature 98.8 °F (37.1 °C), resp. rate 18, height 5' 4\" (1.626 m), weight 147 lb 9.6 oz (67 kg), SpO2 96 %. Temp (24hrs), Av.7 °F (37.1 °C), Min:97.9 °F (36.6 °C), Max:99.5 °F (37.5 °C)      Intake / Output:    Intake/Output Summary (Last 24 hours) at 17 1206  Last data filed at 17 0700   Gross per 24 hour   Intake              120 ml   Output              575 ml   Net             -455 ml       Physical Exam:    GEN: alert, cooperative, no distress, appears stated age  Incision:  right leg  healing well, left leg  healing well  Feet are warm without ischemic changes. Labs:   Recent Labs      17   0505  17   0656  17   0601   HGB   --   8.2*  9.3*   WBC   --   10.9  13.9*   K  3.6  3.5  3.9   GLU  117*  106*  113*       Data Review reviewed  Consultants documentation and Nursing documentation    Assessment:     Patient Active Problem List    Diagnosis Date Noted    Abdominal aortic aneurysm (AAA) without rupture ([facility] [address] 2017    Enterococcus UTI 2017    Altered mental status 2017    Fever 2017       Plan/Recommendations/Medical Decision Making:     Continue present treatment    Elements of this note have been dictated using speech recognition software. As a result, errors of speech recognition may have occurred.

## 2017-08-20 NOTE — PROGRESS NOTES
Problem: Mobility Impaired (Adult and Pediatric)  Goal: *Acute Goals and Plan of Care (Insert Text)  STG:  (1.)Mr. Scheyder will move from supine to sit and sit to supine , scoot up and down and roll side to side with CONTACT GUARD ASSIST within 3 day(s). (2.)Mr. Scheyder will transfer from bed to chair and chair to bed with CONTACT GUARD ASSIST using the least restrictive device within 3 day(s). (3.)Mr. Scheyder will ambulate with CONTACT GUARD ASSIST for 30 feet with the least restrictive device within 3 day(s). LTG:  (1.)Mr. Scheyder will move from supine to sit and sit to supine , scoot up and down and roll side to side in bed with MODIFIED INDEPENDENCE within 7 day(s). (2.)Mr. Scheyder will transfer from bed to chair and chair to bed with MODIFIED INDEPENDENCE using the least restrictive device within 7 day(s). (3.)Mr. Scheyder will ambulate with SUPERVISION for 150+ feet with the least restrictive device within 7 day(s). PHYSICAL THERAPY: Daily Note, Treatment Day: 6th and AM 8/20/2017  INPATIENT: Hospital Day: 20  Payor: Jered Aiken / Plan: [employer]NA MEDICARE CHOICE PPO/PFFS / Product Type: Managed Care Medicare /      NAME/AGE/GENDER: Darlene Decker is a 80 y.o. male          PRIMARY DIAGNOSIS: Abdominal aortic aneurysm (AAA) without rupture (HCC) [I71.4] <principal problem not specified> <principal problem not specified>  Procedure(s) (LRB):  AORTIC ABDOMINAL ANEURYSM REPAIR ENDOVASCULAR (EVAR) WITH  BILATERAL CUT-DOWNS  (N/A)  4 Days Post-Op  ICD-10: Treatment Diagnosis:       · Generalized Muscle Weakness (M62.81)  · Difficulty in walking, Not elsewhere classified (R26.2)   Precaution/Allergies:  Review of patient's allergies indicates no known allergies. ASSESSMENT:      Mr. Justyn Rees is now s/p AAA repair. Pt is agreeable to get up today and reports no pain but a walker was introduced because of pain and difficulty in his left leg.   Pt is oriented to year, off one month and 0 to day. We stood to edge of bed and pt cried out in pain with WBing on the left and points to left medial knee joint and along the tibia. Pt states it is new and that he has not fallen. Several more attempts with walker pt  cries out in pain. Range of motion mildly limited and mildly painful. Palpation is not painful. Muscle testing only mildly painful. Ligament testing and meniscus testing was not painful. Only WBing. Called RN Brock Moyer in to see pt. She states he fell while here in the hospital.  We transferred with stand and turn transfer to the room chair with partial WBing and walker use. Evan alarm placed in chair. Lunch tray table placed in front. Brock Moyer RN to notify MD.  Pt is not able to walk at this time with the pain. Will need further assessment to rule out injury/fracture before we can further mobilize. He has decrease memory and some decreased insight to his ability. He tells me his step son will by staying with him at home upon discharge and that his family is coming to see him today. Agree that patient will need at least some post acute rehab prior to possible return home if he is safe to live by himself. (Notes from last treatment:  He is incontinent so a brief is a must.  He must be on a posey pad as he is impulsive and will get up without asking for assist.  Mr. Lokesh Ling lives by himself at baseline and is currently functioning well below baseline therefore is appropriate for skilled PT to maximize his rehab potential.  Will likely need a post acute rehab stay prior to returning home. Re assessed goals above and any goals that had been met are now active again.)    This section established at most recent assessment   PROBLEM LIST (Impairments causing functional limitations):  1. Decreased Strength  2. Decreased ADL/Functional Activities  3. Decreased Transfer Abilities  4. Decreased Ambulation Ability/Technique  5. Decreased Balance  6.  Decreased Activity Tolerance  7. Decreased Pacing Skills  8. Increased Fatigue  9. Increased Shortness of Breath  10. Decreased [place] with Home Exercise Program    INTERVENTIONS PLANNED: (Benefits and precautions of physical therapy have been discussed with the patient.)  1. Balance Exercise  2. Bed Mobility  3. Family Education  4. Gait Training  5. Home Exercise Program (HEP)  6. Neuromuscular Re-education/Strengthening  7. Range of Motion (ROM)  8. Therapeutic Activites  9. Therapeutic Exercise/Strengthening  10. Transfer Training  11. Group Therapy      TREATMENT PLAN: Frequency/Duration: 3 times a week for duration of hospital stay  Rehabilitation Potential For Stated Goals: FAIR      RECOMMENDED REHABILITATION/EQUIPMENT: (at time of discharge pending progress): Continue Skilled Therapy and Rehab. HISTORY:   History of Present Injury/Illness (Reason for Referral):  See H&P below  79 y/o male with history of HTN, hypothyroidism, \"renal disorder\" and dementia is sent in transfer from [facility] with diagnosis of UTI, fever, worsening renal failure, hypoxemia and URI/pna. He is alert but a poor historian. His main complaint is of shivering. No family or visitors present to give more history. He states he lives with family. Workup at [employer] showed a UTI, temp 100.7, O2 sat 90% RA with PaO2 69. Cr up to 1.8 from 1.3. WBC ct 13.1, lactic acid 1.5 and . Chest xray was reported as normal. Do not know what patient's mental status baseline is to know if he is having delerium from acute illness or not. He was given IVF, rocephin and azithromycin and transferred to [address] [place] [address]. Patient denies chest pain, shortness of breath, nausea or vomiting. Has has malaise, weakness, cough and congestion. Past Medical History/Comorbidities:   Mr. Alix Romero  has a past medical history of CAD (coronary artery disease).   Mr. Alix Romero  has a past surgical history that includes cardiac surg procedure unlist.  Social History/Living Environment:   Home Environment: Private residence  # Steps to Enter: 6  Rails to Enter: Yes  Hand Rails : Bilateral  One/Two Story Residence: One story  Living Alone: Yes  Support Systems: Child(jessica)  Patient Expects to be Discharged to[de-identified] Private residence  Current DME Used/Available at Home: Marilynne Grise, rollator, [place], straight, Shower chair  Tub or Shower Type: Tub/Shower combination  Prior Level of Function/Work/Activity:  Lives alone, use of rollator for gait, indep with ADLs, 0 falls. Number of Personal Factors/Comorbidities that affect the Plan of Care: 1-2: MODERATE COMPLEXITY   EXAMINATION:   Most Recent Physical Functioning:   Gross Assessment:                  Posture:     Balance:    Bed Mobility:     Wheelchair Mobility:     Transfers:     Gait:             Body Structures Involved:  1. Heart  2. Lungs  3. Bones  4. Joints  5. Muscles Body Functions Affected:  1. Sensory/Pain  2. Cardio  3. Respiratory  4. Neuromusculoskeletal  5. Movement Related Activities and Participation Affected:  1. General Tasks and Demands  2. Mobility  3. Self Care  4. Domestic Life  5. Interpersonal Interactions and Relationships  6. Community, Social and [place]   Number of elements that affect the Plan of Care: 4+: HIGH COMPLEXITY   CLINICAL PRESENTATION:   Presentation: Evolving clinical presentation with changing clinical characteristics: MODERATE COMPLEXITY   CLINICAL DECISION MAKIN [facility] Mobility Inpatient Short Form  How much difficulty does the patient currently have. .. Unable A Lot A Little None   1. Turning over in bed (including adjusting bedclothes, sheets and blankets)? [ ] 1   [ ] 2   [X] 3   [ ] 4   2. Sitting down on and standing up from a chair with arms ( e.g., wheelchair, bedside commode, etc.)   [ ] 1   [X] 2   [ ] 3   [ ] 4   3. Moving from lying on back to sitting on the side of the bed?    [ ] 1   [ ] 2   [X] 3   [ ] 4   How much help from another person does the patient currently need. .. Total A Lot A Little None   4. Moving to and from a bed to a chair (including a wheelchair)? [ ] 1   [X] 2   [ ] 3   [ ] 4   5. Need to walk in hospital room? [ ] 1   [X] 2   [ ] 3   [ ] 4   6. Climbing 3-5 steps with a railing? [X] 1   [ ] 2   [ ] 3   [ ] 4   © 2007, Trustees of [address] [place] [id number] [address] [id number], under license to [employer]. All rights reserved    Score:  Initial: 13 Most Recent: X (Date: -- )     Interpretation of Tool:  Represents activities that are increasingly more difficult (i.e. Bed mobility, Transfers, Gait). Score 24 23 22-20 19-15 14-10 9-7 6       Modifier CH CI CJ CK CL CM CN         · Mobility - Walking and Moving Around:               - CURRENT STATUS:    CL - 60%-79% impaired, limited or restricted               - GOAL STATUS:           CK - 40%-59% impaired, limited or restricted               - D/C STATUS:                       ---------------To be determined---------------  Payor: HUMANA MEDICARE / Plan: [facility] HU[employer]NA MEDICARE CHOICE PPO/PFFS / Product Type: Managed Care Medicare /       Medical Necessity:     · Patient is expected to demonstrate progress in strength, range of motion, balance, coordination and functional technique to decrease assistance required with gait, transfers, and functional mobility. Reason for Services/Other Comments:  · Patient continues to require skilled intervention due to decreased strength, decreased balance, decreased functional tolerance, decreased cardiopulmonary endurance affecting participation in basic ADLs and functional tasks.    Use of outcome tool(s) and clinical judgement create a POC that gives a: Questionable prediction of patient's progress: MODERATE COMPLEXITY                 TREATMENT:   (In addition to Assessment/Re-Assessment sessions the following treatments were rendered)   Pre-treatment Symptoms/Complaints: frequently needs to use the bathroom and doesn't always go when he gets there.  Pain: Initial:      Post Session: Ok when not weightbearing      Therapeutic Activity: (    20 minutes): Therapeutic activities including Chair transfers and Ambulation on level ground to improve mobility, strength and balance. Required moderate   to transfer safely to chair. .     Left knee assessment:  No increase in swelling or heat L vs. R.  Points to left medial knee joint and tibial pain with WBing. MMT good with little increase in pain. ROM:  Ok with little increase in pain. Ligaments: no increase in pain. Severe to moderate pain with standing and WBing. Is status post fall here in the hospital - unsure of date. Braces/Orthotics/Lines/Etc:   · IV  Treatment/Session Assessment:    · Response to Treatment:  See above  · Interdisciplinary Collaboration:  · Registered Nurse Vanessa Harper  · After treatment position/precautions:  · Up in chair, Bed alarm/tab alert on, Bed/Chair-wheels locked, Call light within reach and RN notified  · Compliance with Program/Exercises: Will assess as treatment progresses. · Recommendations/Intent for next treatment session: \"Next visit will focus on advancements to more challenging activities and reduction in assistance provided\".   Total Treatment DurationPT Patient Time In/Time Out  Time In: 2168  Time Out: 102 [facility], PT

## 2017-08-20 NOTE — PROGRESS NOTES
Hospitalist Progress Note    2017  Admit Date: 2017 12:17 AM   NAME: Darlene Decker   :  10/18/1931   MRN:  [id number]   Attending: Diallo Molina MD  PCP:  None    SUBJECTIVE:   As previously documented:  'Darlene Decker is a 74ZHC with HTN, CKD, dementia, hypothyroidism who was admitted on  with UTI, JEB, hypoxia, and pneumonia. Started on abx, has been improving. Has had increased leukocytosis. ID was consulted and abx stopped, leukocytosis attributed to steroids. US found 4cm AAA and found to have high grade L renal artery stenosis. Had AAA yesterday, with post-op care in the ICU. Has been cleared by vascular for transfer to the floor. He is on RA, sitting in chair. Doing well. Denies post-op pain, SOB, CP, nausea.'    -   No overnight events. Denies any complaints  No chest pain/SOB/abdominal pain/fever/urinary urgency or frequency  Was noted to have atrial fibrillation without RVR, rate was controlled. Denies any palpitations currently. Review of Systems negative with exception of pertinent positives noted above  PHYSICAL EXAM     Visit Vitals    /63 (BP 1 Location: Right arm, BP Patient Position: Lying left side)    Pulse 85    Temp 98.8 °F (37.1 °C)    Resp 14    Ht 5' 4\" (1.626 m)    Wt 67 kg (147 lb 9.6 oz)    SpO2 97%    BMI 25.34 kg/m2      Temp (24hrs), Av.7 °F (37.1 °C), Min:97.9 °F (36.6 °C), Max:99.5 °F (37.5 °C)    Oxygen Therapy  O2 Sat (%): 97 % (17)  Pulse via Oximetry: 96 beats per minute (17)  O2 Device: Room air (17)  O2 Flow Rate (L/min): 0 l/min (17)  FIO2 (%): 21 % (17)    Intake/Output Summary (Last 24 hours) at 17 0831  Last data filed at 17 07   Gross per 24 hour   Intake              120 ml   Output              675 ml   Net             -555 ml      General: No acute distress, alert and oriented to self and year    Lungs:  CTA Bilaterally.    Heart:  Regular rate and rhythm,  No murmur, rub, or gallop  Abdomen: Soft, Non distended, Non tender, Positive bowel sounds  Extremities: No cyanosis, clubbing or edema, femoral surgical incision sites covered  Neurologic:  gcs 15, no motor or sensory deficits, CN 2-12 intact, cerebellar functions intact  Psych:             AO x 3, mood and affect appropriate    Recent Results (from the past 24 hour(s))   EKG, 12 LEAD, INITIAL    Collection Time: 08/19/17 10:53 AM   Result Value Ref Range    Ventricular Rate 93 BPM    Atrial Rate 96 BPM    QRS Duration 82 ms    Q-T Interval 370 ms    QTC Calculation (Bezet) 460 ms    Calculated R Axis 41 degrees    Calculated T Axis 15 degrees    Diagnosis       Atrial fibrillation with premature ventricular or aberrantly conducted   complexes  Abnormal ECG  When compared with ECG of 12-JUN-2017 20:38,  Atrial fibrillation has replaced Sinus rhythm  Vent. rate has increased BY  33 BPM  Confirmed by Muriel Osborn ([id number]) on 8/19/2017 11:41:21 AM     MAGNESIUM    Collection Time: 08/20/17  5:05 AM   Result Value Ref Range    Magnesium 2.1 1.8 - 2.4 mg/dL   METABOLIC PANEL, BASIC    Collection Time: 08/20/17  5:05 AM   Result Value Ref Range    Sodium 143 136 - 145 mmol/L    Potassium 3.6 3.5 - 5.1 mmol/L    Chloride 108 (H) 98 - 107 mmol/L    CO2 27 21 - 32 mmol/L    Anion gap 8 7 - 16 mmol/L    Glucose 117 (H) 65 - 100 mg/dL    BUN 21 8 - 23 MG/DL    Creatinine 1.35 0.8 - 1.5 MG/DL    GFR est AA >60 >60 ml/min/1.73m2    GFR est non-AA 53 (L) >60 ml/min/1.73m2    Calcium 8.0 (L) 8.3 - 10.4 MG/DL     IR FLUOROSCOPY < 1 HOUR   Final Result      XR CHEST PORT   Final Result   IMPRESSION: Interstitial prominence which may represent edema. Small patchy   density partially obscures right hemidiaphragm. There are sternal wires. CTA ABD ART W RUNOFF W WO CONT   Final Result   IMPRESSION:   1.  Infrarenal abdominal aortic aneurysm with saccular component measuring 4.0, x   6.7 cm. 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 2. High-grade left renal artery stenosis. 3. Right lower extremity: Moderate stenosis of the common femoral artery and   diffuse high-grade disease of SFA. 4. Left lower extremity: Occlusion of SFA reconstitution at the popliteal artery   level. 5. Bilateral pleural effusions. Cholelithiasis. Recommend vascular surgical consultation. DC4   The significant findings in this report have been referred to the Imaging   Navigator in order to communicate to the referring provider or his/her designee   as outlined in Section II.C.2.a.ii-iii of the ACR   Practice Guideline for Communication of Diagnostic Imaging Findings. .      XR CHEST SNGL V   Final Result   IMPRESSION:    1.  Stable cardiomegaly without evolving acute changes seen. US RETROPERITONEUM COMP   Final Result   IMPRESSION:      1) A 4.1 cm abdominal aortic aneurysm with peripheral thrombus. 2) Negative for hydronephrosis. Abnormally increased renal echogenicity. 3) Simple renal cysts. ASSESSMENT      Active Hospital Problems    Diagnosis Date Noted    Abdominal aortic aneurysm (AAA) without rupture ([facility] [address] 08/18/2017    Enterococcus UTI 08/18/2017    Altered mental status 08/01/2017    Fever 08/01/2017     Plan:    · New onset atrial fib, rate controlled: [id number] score of 4, should be on [address] [place] [address] Road but given recent vascular repair, is at high risk of bleeding. Will appreciate if cardiology can address new onset A.fib. Will repeat ekg today and follow up. · AAA- s/p repair on 8/16/17. · Enterococcus UTI- s/p treatment with ampicililn/zosyn/vanc prior to surgery. Repeat cultures negative. · Altered mental status- resolved, at baseline now  · Leukocytosis- resolved, likely post op inflammation. · Post-op acute blood loss anemia- Hb is trending down gradually, from 11.8 --> 9.3 --> 8.9, will f/u today's H/H, transfuse if continues to decline. FOBT.     Pt has been accepted by Rhea Peter CM working on disposition.     DVT Prophylaxis: Lovenox    Dispo- pending, not medically stable    Signed By: Andrea Herr MD     August 20, 2017

## 2017-08-20 NOTE — PROGRESS NOTES
Pt denies any pain and slept all night. Pt did not have a BM and hourly rounds were done. Pt needs were met. Report is given to day shift nurse and will continue to monitor.

## 2017-08-20 NOTE — PROGRESS NOTES
Problem: Self Care Deficits Care Plan (Adult)  Goal: *Acute Goals and Plan of Care (Insert Text)  1. Patient will complete lower body bathing and dressing with modified independence and adaptive equipment as needed. Continue  2. Patient will complete toileting with modified independence. Continue  3. Patient will tolerate 23 minutes of OT treatment with less than 3 rest breaks to increase activity tolerance for ADLs. Continue   Revised    Revised Goals (08/18/2017):  5. Patient will verbalize understanding of safety awareness. 6. Patient will complete functional transfers with modified independence demonstrating good safety awareness and adaptive equipment as needed. Timeframe: 7 visits     Comments:       OCCUPATIONAL THERAPY: Daily Note and Treatment Day: 2nd 8/20/2017  INPATIENT: Hospital Day: 20  Payor: Kathy Perez / Plan: [facility] [employer] MEDICARE CHOICE PPO/PFFS / Product Type: Managed Care Medicare /      NAME/AGE/GENDER: Wale Ruiz is a 80 y.o. male          PRIMARY DIAGNOSIS:  Abdominal aortic aneurysm (AAA) without rupture (HCC) [I71.4] <principal problem not specified> <principal problem not specified>  Procedure(s) (LRB):  AORTIC ABDOMINAL ANEURYSM REPAIR ENDOVASCULAR (EVAR) WITH  BILATERAL CUT-DOWNS  (N/A)  4 Days Post-Op  ICD-10: Treatment Diagnosis:        · Generalized Muscle Weakness (M62.81)   Precautions/Allergies:         Review of patient's allergies indicates no known allergies. ASSESSMENT:      Mr. Sawyer Floyd presents to hospital for above. Pt reports that he lives alone in a [facility], there are 3 KAYLEN with bilateral hand rails. Pt has access to a cane and rollator, but he mostly uses the rollator for functional mobility. He is typically independent/mod I with ADLs/IADLs. Pt is no longer driving. Per chart, pt PMHx includes dementia and reports that he lives with family, so information gathered above may be inaccurate.    8/20/2017  Patient was supine in bed upon arrival but agreeable to OT treatment. Pt continues to describe L LE pain. Pt able to stand with minimal assistance with cues to decrease weightbearing in L LE if pain increases. Pt able to take a few steps with CGA but reports pain, therefore mobility was stopped and patient needed safety training with transfer to return back to sitting edge of bed. Pt reports pain along medial side of knee with increased pain with knee extension. Pain extends to the hip according to patient. No swelling or bruising noted. Pt did not want to participate in any ADL tasks but was agreeable to exercises sitting edge of bed. Pt reports increased pain in L ribcage as well as L UE with attempts to complete ROM exercises. Decreased  on L side vs R. No c/o tingling/numbness. Pt returned back to supine after treatment with posey on. Pt reports pain on L side started approximately two days ago. Pt states he is likely going to rehab at discharge. Pt could benefit from rehab due to decline with ADL/functional transfers. Pt to continue per plan of care. This section established at most recent assessment   PROBLEM LIST (Impairments causing functional limitations):  1. Decreased Strength  2. Decreased ADL/Functional Activities  3. Decreased Transfer Abilities  4. Decreased Ambulation Ability/Technique  5. Decreased Balance  6. Decreased Activity Tolerance  7. Decreased Pacing Skills  8. Decreased Work Simplification/Energy Conservation Techniques  9. Increased Fatigue  10. Increased Shortness of Breath  11. Decreased [place] with Home Exercise Program  12. Decreased Cognition    INTERVENTIONS PLANNED: (Benefits and precautions of occupational therapy have been discussed with the patient.)  1. Activities of daily living training  2. Adaptive equipment training  3. Balance training  4. Group therapy  5. Therapeutic activity  6.  Therapeutic exercise      TREATMENT PLAN: Frequency/Duration: Follow patient 3x/week to address above goals.  Rehabilitation Potential For Stated Goals: GOOD      RECOMMENDED REHABILITATION/EQUIPMENT: (at time of discharge pending progress): Continue Skilled Therapy. OCCUPATIONAL PROFILE AND HISTORY:   History of Present Injury/Illness (Reason for Referral):  See H&P  Past Medical History/Comorbidities:   Mr. Charan Moreno  has a past medical history of CAD (coronary artery disease). Mr. Charan Moreno  has a past surgical history that includes cardiac surg procedure unlist.  Social History/Living Environment:   Home Environment: Private residence  # Steps to Enter: 6  Rails to Enter: Yes  Hand Rails : Bilateral  One/Two Story Residence: One story  Living Alone: Yes  Support Systems: Child(jessica)  Patient Expects to be Discharged to[de-identified] Private residence  Current DME Used/Available at Home: Liana Love, beba, Gil Roberto, straight, Shower chair  Tub or Shower Type: Tub/Shower combination  Prior Level of Function/Work/Activity:  [place]/mod I with ADLs/IADLs  Personal Factors:          Sex:  male        Age:  80 y.o. Number of Personal Factors/Comorbidities that affect the Plan of Care: Expanded review of therapy/medical records (1-2):  MODERATE COMPLEXITY   ASSESSMENT OF OCCUPATIONAL PERFORMANCE[de-identified]   Activities of Daily Living:           Basic ADLs (From Assessment) Complex ADLs (From Assessment)   Basic ADL  Feeding: Independent  Oral Facial Hygiene/Grooming: Modified Independent  Bathing: Minimum assistance  Upper Body Dressing: Modified independent  Lower Body Dressing: Moderate assistance  Toileting: Minimum assistance Instrumental ADL  Meal Preparation: Moderate assistance  Homemaking: Maximum assistance  Medication Management: Supervision  Financial Management: Setup   Grooming/Bathing/Dressing Activities of Daily Living     Cognitive Retraining  Safety/Judgement: Decreased awareness of need for safety;Decreased insight into deficits; Fall prevention;Home safety                       Bed/Mat Mobility  Supine to Sit: Minimum assistance  Sit to Supine: Supervision  Sit to Stand: Minimum assistance  Scooting: Supervision          Most Recent Physical Functioning:   Gross Assessment:                  Posture:     Balance:  Sitting: Impaired  Sitting - Static: Good (unsupported)  Sitting - Dynamic: Fair (occasional)  Standing: Impaired  Standing - Static: Fair  Standing - Dynamic : Fair Bed Mobility:  Supine to Sit: Minimum assistance  Sit to Supine: Supervision  Scooting: Supervision  Wheelchair Mobility:     Transfers:  Sit to Stand: Minimum assistance                    Patient Vitals for the past 6 hrs:   BP BP Patient Position SpO2 Pulse   08/20/17 1102 132/64 At rest 94 % 72   08/20/17 1131 - - 96 % -   08/20/17 1536 122/54 Lying right side 97 % 85        Mental Status  Neurologic State: Alert  Orientation Level: Oriented to person  Cognition: Follows commands, Impaired decision making, Impulsive  Perception: Verbal  Perseveration: No perseveration noted  Safety/Judgement: Decreased awareness of need for safety, Decreased insight into deficits, Fall prevention, Home safety                               Physical Skills Involved:  1. Range of Motion  2. Balance  3. Strength  4. Activity Tolerance  5. Gross Motor Control Cognitive Skills Affected (resulting in the inability to perform in a timely and safe manner):  1. Executive Function  2. Immediate Memory  3. Short Term Recall  4. Comprehension Psychosocial Skills Affected:  1. Habits/Routines  2. Environmental Adaptation  3. Social Interaction  4. Self-Awareness   Number of elements that affect the Plan of Care: 5+:  HIGH COMPLEXITY   CLINICAL DECISION MAKING:   [employer] AM-PAC 6 Clicks   Daily Activity Inpatient Short Form  How much help from another person does the patient currently need. .. Total A Lot A Little None   1. Putting on and taking off regular lower body clothing?   [ ] 1   [X] 2   [ ] 3   [ ] 4   2.   Bathing (including washing, rinsing, drying)? [ ] 1   [ [employer] 2   [ ] 3   [ ] 4   3. Toileting, which includes using toilet, bedpan or urinal?   [ ] 1   [ ] 2   [X] 3   [ ] 4   4. Putting on and taking off regular upper body clothing?   [ ] 1   [ ] 2   [ ] 3   [X] 4   5. Taking care of personal grooming such as brushing teeth? [ ] 1   [ ] 2   Mia.Dung ] 3   [ ] 4   6. Eating meals? [ ] 1   [ ] 2   [ ] 3   [X] 4   © 2007, Trustees of [employer], under license to [employer]. All rights reserved    Score:  Initial: 18 Most Recent: X (Date: -- )     Interpretation of Tool:  Represents activities that are increasingly more difficult (i.e. Bed mobility, Transfers, Gait). Score 24 23 22-20 19-15 14-10 9-7 6       Modifier CH CI CJ CK CL CM CN         · Self Care:               - CURRENT STATUS:    CK - 40%-59% impaired, limited or restricted               - GOAL STATUS:           CI - 1%-19% impaired, limited or restricted               - D/C STATUS:                       ---------------To be determined---------------  Payor: HU[employer]NA MEDICARE / Plan: [facility] HU[employer]NA MEDICARE CHOICE PPO/PFFS / Product Type: Managed Care Medicare /       Medical Necessity:     · Patient is expected to demonstrate progress in strength, balance, coordination, functional technique and activity tolerance to improve safety during ADLs. Reason for Services/Other Comments:  · Patient continues to require skilled intervention due to medical complications. Use of outcome tool(s) and clinical judgement create a POC that gives a: MODERATE COMPLEXITY             TREATMENT:   (In addition to Assessment/Re-Assessment sessions the following treatments were rendered)      Pre-treatment Symptoms/Complaints:    Pain: Initial:   Pain Intensity 1: 8  Pain Location 1: Hip, Rib cage, Shoulder  Pain Orientation 1: Left  Post Session:  same      Therapeutic Activity: (    10):   Therapeutic activities including Bed transfers, Ambulation on level ground and side scooting edge of bed to improve mobility, strength, balance and coordination. Required minimal   to promote static and dynamic balance in standing. Therapeutic Exercise: ( 15):  Exercises per grid below to improve mobility, strength, balance and coordination. Required moderate visual and verbal cues to promote proper body alignment, promote proper body posture, promote proper body mechanics and promote proper body breathing techniques. Progressed repetitions and complexity of movement as indicated. Date:  8/20/17 Date:   Date:     Activity/Exercise Parameters Parameters Parameters   Scapular Retraction  8 reps additional time (pain)     Neck flexion/extension 3-5 reps (pain)     Neck Rotation  4-6 reps (pain)     Shoulder flexion  8 reps (pain)     Elbow flexion/extension  8 reps (pain)     Forearm supination/pronation  8 reps (pain)     Wrist flexion/extension  8 reps (pain)     Gripping 8 reps (pain)           Re-Assessment       Braces/Orthotics/Lines/Etc:   · IV  · O2 Device: Nasal cannula  Treatment/Session Assessment:    · Response to Treatment:  eval only. · Interdisciplinary Collaboration:Occupational Therapist, Registered Nurse and Certified Nursing Assistant/Patient Care Technician  · After treatment position/precautions:  · Supine in bed, Bed alarm/tab alert on, Bed/Chair-wheels locked, Call light within reach, RN notified and Assistant at bedside  · Compliance with Program/Exercises: compliant all of the time today. · Recommendations/Intent for next treatment session: \"Next visit will focus on advancements to more challenging activities and reduction in assistance provided\".   Total Treatment Duration: 25 minutes  OT Patient Time In/Time Out  Time In: 1513  Time Out: 1015 [place] OT

## 2017-08-21 LAB
ATRIAL RATE: 77 BPM
BACTERIA SPEC CULT: NORMAL
CALCULATED P AXIS, ECG09: 52 DEGREES
CALCULATED R AXIS, ECG10: 22 DEGREES
CALCULATED T AXIS, ECG11: 35 DEGREES
DIAGNOSIS, 93000: NORMAL
ERYTHROCYTE [DISTWIDTH] IN BLOOD BY AUTOMATED COUNT: 15.3 % (ref 11.9–14.6)
HCT VFR BLD AUTO: 23.8 % (ref 41.1–50.3)
HEMOCCULT STL QL: NEGATIVE
HGB BLD-MCNC: 8.1 G/DL (ref 13.6–17.2)
MAGNESIUM SERPL-MCNC: 2.2 MG/DL (ref 1.8–2.4)
MCH RBC QN AUTO: 31.3 PG (ref 26.1–32.9)
MCHC RBC AUTO-ENTMCNC: 34 G/DL (ref 31.4–35)
MCV RBC AUTO: 91.9 FL (ref 79.6–97.8)
P-R INTERVAL, ECG05: 164 MS
PLATELET # BLD AUTO: 207 K/UL (ref 150–450)
PMV BLD AUTO: 10.3 FL (ref 10.8–14.1)
Q-T INTERVAL, ECG07: 406 MS
QRS DURATION, ECG06: 90 MS
QTC CALCULATION (BEZET), ECG08: 459 MS
RBC # BLD AUTO: 2.59 M/UL (ref 4.23–5.67)
SERVICE CMNT-IMP: NORMAL
VENTRICULAR RATE, ECG03: 77 BPM
WBC # BLD AUTO: 9.2 K/UL (ref 4.3–11.1)

## 2017-08-21 PROCEDURE — 94640 AIRWAY INHALATION TREATMENT: CPT

## 2017-08-21 PROCEDURE — 74011250636 HC RX REV CODE- 250/636: Performed by: HOSPITALIST

## 2017-08-21 PROCEDURE — P9016 RBC LEUKOCYTES REDUCED: HCPCS | Performed by: HOSPITALIST

## 2017-08-21 PROCEDURE — 87040 BLOOD CULTURE FOR BACTERIA: CPT | Performed by: HOSPITALIST

## 2017-08-21 PROCEDURE — 36430 TRANSFUSION BLD/BLD COMPNT: CPT

## 2017-08-21 PROCEDURE — 87086 URINE CULTURE/COLONY COUNT: CPT | Performed by: HOSPITALIST

## 2017-08-21 PROCEDURE — 83735 ASSAY OF MAGNESIUM: CPT | Performed by: SURGERY

## 2017-08-21 PROCEDURE — 74011250637 HC RX REV CODE- 250/637: Performed by: INTERNAL MEDICINE

## 2017-08-21 PROCEDURE — 94760 N-INVAS EAR/PLS OXIMETRY 1: CPT

## 2017-08-21 PROCEDURE — 85027 COMPLETE CBC AUTOMATED: CPT | Performed by: SURGERY

## 2017-08-21 PROCEDURE — 74011000250 HC RX REV CODE- 250: Performed by: NURSE PRACTITIONER

## 2017-08-21 PROCEDURE — 30233N1 TRANSFUSION OF NONAUTOLOGOUS RED BLOOD CELLS INTO PERIPHERAL VEIN, PERCUTANEOUS APPROACH: ICD-10-PCS | Performed by: INTERNAL MEDICINE

## 2017-08-21 PROCEDURE — 86900 BLOOD TYPING SEROLOGIC ABO: CPT | Performed by: HOSPITALIST

## 2017-08-21 PROCEDURE — 36415 COLL VENOUS BLD VENIPUNCTURE: CPT | Performed by: SURGERY

## 2017-08-21 PROCEDURE — 74011250637 HC RX REV CODE- 250/637: Performed by: HOSPITALIST

## 2017-08-21 PROCEDURE — 77030013131 HC IV BLD ST ICUM -A

## 2017-08-21 PROCEDURE — 86923 COMPATIBILITY TEST ELECTRIC: CPT | Performed by: HOSPITALIST

## 2017-08-21 PROCEDURE — 65660000000 HC RM CCU STEPDOWN

## 2017-08-21 RX ORDER — POTASSIUM CHLORIDE 14.9 MG/ML
20 INJECTION INTRAVENOUS
Status: ACTIVE | OUTPATIENT
Start: 2017-08-21 | End: 2017-08-21

## 2017-08-21 RX ORDER — SODIUM CHLORIDE 9 MG/ML
250 INJECTION, SOLUTION INTRAVENOUS AS NEEDED
Status: DISCONTINUED | OUTPATIENT
Start: 2017-08-21 | End: 2017-08-22 | Stop reason: HOSPADM

## 2017-08-21 RX ADMIN — IPRATROPIUM BROMIDE AND ALBUTEROL SULFATE 3 ML: 2.5; .5 SOLUTION RESPIRATORY (INHALATION) at 08:40

## 2017-08-21 RX ADMIN — DOCUSATE SODIUM 100 MG: 100 CAPSULE, LIQUID FILLED ORAL at 18:14

## 2017-08-21 RX ADMIN — BACLOFEN 10 MG: 10 TABLET ORAL at 09:00

## 2017-08-21 RX ADMIN — IPRATROPIUM BROMIDE AND ALBUTEROL SULFATE 3 ML: 2.5; .5 SOLUTION RESPIRATORY (INHALATION) at 23:06

## 2017-08-21 RX ADMIN — FERROUS SULFATE TAB 325 MG (65 MG ELEMENTAL FE) 325 MG: 325 (65 FE) TAB at 06:04

## 2017-08-21 RX ADMIN — Medication 10 ML: at 13:51

## 2017-08-21 RX ADMIN — FINASTERIDE 5 MG: 5 TABLET, FILM COATED ORAL at 09:00

## 2017-08-21 RX ADMIN — BACLOFEN 10 MG: 10 TABLET ORAL at 18:14

## 2017-08-21 RX ADMIN — ENOXAPARIN SODIUM 30 MG: 30 INJECTION SUBCUTANEOUS at 13:51

## 2017-08-21 RX ADMIN — METOPROLOL TARTRATE 25 MG: 25 TABLET ORAL at 20:32

## 2017-08-21 RX ADMIN — POTASSIUM CHLORIDE 20 MEQ: 14.9 INJECTION, SOLUTION INTRAVENOUS at 10:45

## 2017-08-21 RX ADMIN — HYDRALAZINE HYDROCHLORIDE 50 MG: 50 TABLET ORAL at 18:14

## 2017-08-21 RX ADMIN — IPRATROPIUM BROMIDE AND ALBUTEROL SULFATE 3 ML: 2.5; .5 SOLUTION RESPIRATORY (INHALATION) at 00:44

## 2017-08-21 RX ADMIN — PANTOPRAZOLE SODIUM 40 MG: 40 TABLET, DELAYED RELEASE ORAL at 06:04

## 2017-08-21 RX ADMIN — METOPROLOL TARTRATE 25 MG: 25 TABLET ORAL at 09:00

## 2017-08-21 RX ADMIN — SODIUM CHLORIDE 500 ML: 900 INJECTION, SOLUTION INTRAVENOUS at 10:55

## 2017-08-21 RX ADMIN — Medication 10 ML: at 06:06

## 2017-08-21 RX ADMIN — IPRATROPIUM BROMIDE AND ALBUTEROL SULFATE 3 ML: 2.5; .5 SOLUTION RESPIRATORY (INHALATION) at 04:06

## 2017-08-21 RX ADMIN — DOCUSATE SODIUM 100 MG: 100 CAPSULE, LIQUID FILLED ORAL at 09:00

## 2017-08-21 RX ADMIN — HYDRALAZINE HYDROCHLORIDE 50 MG: 50 TABLET ORAL at 20:32

## 2017-08-21 RX ADMIN — ATORVASTATIN CALCIUM 40 MG: 40 TABLET, FILM COATED ORAL at 20:31

## 2017-08-21 RX ADMIN — IPRATROPIUM BROMIDE AND ALBUTEROL SULFATE 3 ML: 2.5; .5 SOLUTION RESPIRATORY (INHALATION) at 19:34

## 2017-08-21 RX ADMIN — LEVOTHYROXINE SODIUM 100 MCG: 100 TABLET ORAL at 06:04

## 2017-08-21 RX ADMIN — BUDESONIDE 500 MCG: 0.5 SUSPENSION RESPIRATORY (INHALATION) at 08:40

## 2017-08-21 RX ADMIN — ASPIRIN 81 MG: 81 TABLET, COATED ORAL at 09:00

## 2017-08-21 RX ADMIN — IPRATROPIUM BROMIDE AND ALBUTEROL SULFATE 3 ML: 2.5; .5 SOLUTION RESPIRATORY (INHALATION) at 12:26

## 2017-08-21 RX ADMIN — HYDRALAZINE HYDROCHLORIDE 50 MG: 50 TABLET ORAL at 09:00

## 2017-08-21 RX ADMIN — BUDESONIDE 500 MCG: 0.5 SUSPENSION RESPIRATORY (INHALATION) at 19:34

## 2017-08-21 RX ADMIN — Medication 10 ML: at 20:33

## 2017-08-21 NOTE — PROGRESS NOTES
Initial pre cert obtained 12 days ago and has . We have re-started pre cert with [facility] and will let the team know when we have it and patient may transfer. Yvon Varela

## 2017-08-21 NOTE — PROGRESS NOTES
08/20/17 2210 08/20/17 2245 08/21/17 0045   Vital Signs   Temp (!) 101.1 °F (38.4 °C) 99.8 °F (37.7 °C) --    Temp Source Oral Oral --    Pulse (Heart Rate) 94 --  --    Heart Rate Source Monitor --  --    Resp Rate 20 --  --    O2 Sat (%) 97 % --  92 %   Level of Consciousness Alert --  --    /67 --  --    MAP (Calculated) 83 --  --    BP 1 Method Automatic --  --    BP 1 Location Left arm --  --    BP Patient Position At rest --  --        08/21/17 0252   Vital Signs   Temp 98.4 °F (36.9 °C)   Temp Source Oral   Pulse (Heart Rate) 95   Heart Rate Source Monitor   Resp Rate 20   O2 Sat (%) 95 %   Level of Consciousness --    /72   MAP (Calculated) 88   BP 1 Method Automatic   BP 1 Location Left arm   BP Patient Position At rest   Pt febrile this shift. Given Tylenol which was effective. Hourly rounds completed this shift. Pt denies pain or needs at this time. Will report to day shift RN.

## 2017-08-21 NOTE — PROGRESS NOTES
Hospitalist Progress Note    2017  Admit Date: 2017 12:17 AM   NAME: Karina Jones   :  10/18/1931   MRN:  [id number]   Attending: Jan Sears MD  PCP:  None    SUBJECTIVE:   As previously documented:  'Karina Jones is a 5AXG with HTN, CKD, dementia, hypothyroidism who was admitted on  with UTI, JEB, hypoxia, and pneumonia. Started on abx, has been improving. Has had increased leukocytosis. ID was consulted and abx stopped, leukocytosis attributed to steroids. US found 4cm AAA and found to have high grade L renal artery stenosis. Had AAA yesterday, with post-op care in the ICU. Has been cleared by vascular for transfer to the floor. He is on RA, sitting in chair. Doing well. Denies post-op pain, SOB, CP, nausea.'    -   No overnight events. No chest pain/SOB/abdominal pain/fever/urinary urgency or frequency    Complains of left shoulder/arm and left knee pain. Review of Systems negative with exception of pertinent positives noted above  PHYSICAL EXAM     Visit Vitals    /72 (BP 1 Location: Left arm, BP Patient Position: At rest)    Pulse 95    Temp 98.4 °F (36.9 °C)    Resp 20    Ht 5' 4\" (1.626 m)    Wt 67 kg (147 lb 9.6 oz)    SpO2 94%    BMI 25.34 kg/m2      Temp (24hrs), Av.8 °F (37.1 °C), Min:97.1 °F (36.2 °C), Max:101.1 °F (38.4 °C)    Oxygen Therapy  O2 Sat (%): 94 % (17 040)  Pulse via Oximetry: 48 beats per minute (17 0406)  O2 Device: Room air (17 040)  O2 Flow Rate (L/min): 0 l/min (17 1512)  FIO2 (%): 21 % (17 0406)    Intake/Output Summary (Last 24 hours) at 17 0720  Last data filed at 17 1358   Gross per 24 hour   Intake              480 ml   Output              125 ml   Net              355 ml      General: No acute distress, alert and oriented to self and year    Lungs:  CTA Bilaterally.    Heart:  Regular rate and rhythm,  No murmur, rub, or gallop  Abdomen: Soft, Non distended, Non tender, Positive bowel sounds  Extremities: No cyanosis, clubbing or edema, femoral surgical incision sites covered  Neurologic:  gcs 15, no motor or sensory deficits, CN 2-12 intact, cerebellar functions intact  Psych:             AO x 3, mood and affect appropriate    Recent Results (from the past 24 hour(s))   EKG, 12 LEAD, INITIAL    Collection Time: 08/20/17 12:09 PM   Result Value Ref Range    Ventricular Rate 77 BPM    Atrial Rate 77 BPM    P-R Interval 164 ms    QRS Duration 90 ms    Q-T Interval 406 ms    QTC Calculation (Bezet) 459 ms    Calculated P Axis 52 degrees    Calculated R Axis 22 degrees    Calculated T Axis 35 degrees    Diagnosis       Sinus rhythm with Premature atrial complexes  Otherwise normal ECG  When compared with ECG of 19-AUG-2017 10:53,  Sinus rhythm has replaced Atrial fibrillation  Confirmed by Jazlyn Mitchell ([id number]) on 8/21/2017 6:40:44 AM     MAGNESIUM    Collection Time: 08/21/17  5:25 AM   Result Value Ref Range    Magnesium 2.2 1.8 - 2.4 mg/dL   CBC W/O DIFF    Collection Time: 08/21/17  5:25 AM   Result Value Ref Range    WBC 9.2 4.3 - 11.1 K/uL    RBC 2.59 (L) 4.23 - 5.67 M/uL    HGB 8.1 (L) 13.6 - 17.2 g/dL    HCT 23.8 (L) 41.1 - 50.3 %    MCV 91.9 79.6 - 97.8 FL    MCH 31.3 26.1 - 32.9 PG    MCHC 34.0 31.4 - 35.0 g/dL    RDW 15.3 (H) 11.9 - 14.6 %    PLATELET 967 662 - 835 K/uL    MPV 10.3 (L) 10.8 - 14.1 FL     XR KNEE LT 3 V   Final Result   IMPRESSION: No evidence of acute bony abnormality. XR SHOULDER LT AP/LAT MIN 2 V   Final Result   IMPRESSION: Postoperative changes without evidence of acute bony abnormality. IR FLUOROSCOPY < 1 HOUR   Final Result      XR CHEST PORT   Final Result   IMPRESSION: Interstitial prominence which may represent edema. Small patchy   density partially obscures right hemidiaphragm. There are sternal wires. CTA ABD ART W RUNOFF W WO CONT   Final Result   IMPRESSION:   1.  Infrarenal abdominal aortic aneurysm with saccular component measuring 4.0, x   6.7 cm. 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 2. High-grade left renal artery stenosis. 3. Right lower extremity: Moderate stenosis of the common femoral artery and   diffuse high-grade disease of SFA. 4. Left lower extremity: Occlusion of SFA reconstitution at the popliteal artery   level. 5. Bilateral pleural effusions. Cholelithiasis. Recommend vascular surgical consultation. DC4   The significant findings in this report have been referred to the Imaging   Navigator in order to communicate to the referring provider or his/her designee   as outlined in Section II.C.2.a.ii-iii of the ACR   Practice Guideline for Communication of Diagnostic Imaging Findings. .      XR CHEST SNGL V   Final Result   IMPRESSION:    1.  Stable cardiomegaly without evolving acute changes seen. US RETROPERITONEUM COMP   Final Result   IMPRESSION:      1) A 4.1 cm abdominal aortic aneurysm with peripheral thrombus. 2) Negative for hydronephrosis. Abnormally increased renal echogenicity. 3) Simple renal cysts. ASSESSMENT      Active Hospital Problems    Diagnosis Date Noted    Abdominal aortic aneurysm (AAA) without rupture ([facility] [address] 08/18/2017    Enterococcus UTI 08/18/2017    Altered mental status 08/01/2017    Fever 08/01/2017     Plan:    · Fever: could be post operative, leukocytosis has been resolved. Will recheck blood, urine culture and UA. · Paroxysmal atrial fib, rate controlled: Converted to NSR. [id number] score of 4, should be on [address] [place] [address] but given recent vascular repair, is at high risk of bleeding. Will appreciate if cardiology can address new onset A.fib. · AAA- s/p repair on 8/16/17. · Enterococcus UTI- s/p treatment with ampicililn/zosyn/vanc prior to surgery. Repeat cultures negative. · Altered mental status- resolved, at baseline now    · Leukocytosis- resolved, likely post op inflammation.     · Post-op acute blood loss anemia- Hb is trending down gradually, from 11.8 --> 9.3 --> 8.9 -- > 8.1. Will transfuse 1 units of PRBC today. Pt has been accepted by [facility] and [facility] working on disposition.     DVT Prophylaxis: Lovenox    Dispo- pending, not medically stable    Signed By: Saul Aguirre MD     August 21, 2017

## 2017-08-22 VITALS
RESPIRATION RATE: 18 BRPM | SYSTOLIC BLOOD PRESSURE: 126 MMHG | BODY MASS INDEX: 25.2 KG/M2 | DIASTOLIC BLOOD PRESSURE: 44 MMHG | TEMPERATURE: 99.3 F | HEIGHT: 64 IN | HEART RATE: 79 BPM | OXYGEN SATURATION: 95 % | WEIGHT: 147.6 LBS

## 2017-08-22 PROBLEM — R41.82 ALTERED MENTAL STATUS: Status: RESOLVED | Noted: 2017-08-01 | Resolved: 2017-08-22

## 2017-08-22 PROBLEM — I48.0 PAF (PAROXYSMAL ATRIAL FIBRILLATION) (HCC): Chronic | Status: ACTIVE | Noted: 2017-08-22

## 2017-08-22 PROBLEM — N39.0 ENTEROCOCCUS UTI: Status: RESOLVED | Noted: 2017-08-18 | Resolved: 2017-08-22

## 2017-08-22 PROBLEM — B95.2 ENTEROCOCCUS UTI: Status: RESOLVED | Noted: 2017-08-18 | Resolved: 2017-08-22

## 2017-08-22 PROBLEM — I48.0 PAF (PAROXYSMAL ATRIAL FIBRILLATION) (HCC): Status: ACTIVE | Noted: 2017-08-22

## 2017-08-22 PROBLEM — G93.41 ACUTE METABOLIC ENCEPHALOPATHY: Status: RESOLVED | Noted: 2017-08-01 | Resolved: 2017-08-22

## 2017-08-22 PROBLEM — R50.9 FEVER: Status: RESOLVED | Noted: 2017-08-01 | Resolved: 2017-08-22

## 2017-08-22 LAB
APPEARANCE UR: CLEAR
BACTERIA URNS QL MICRO: 0 /HPF
BILIRUB UR QL: NEGATIVE
CASTS URNS QL MICRO: ABNORMAL /LPF
COLOR UR: YELLOW
EPI CELLS #/AREA URNS HPF: ABNORMAL /HPF
ERYTHROCYTE [DISTWIDTH] IN BLOOD BY AUTOMATED COUNT: 15.3 % (ref 11.9–14.6)
GLUCOSE UR STRIP.AUTO-MCNC: NEGATIVE MG/DL
HCT VFR BLD AUTO: 25.6 % (ref 41.1–50.3)
HGB BLD-MCNC: 8.8 G/DL (ref 13.6–17.2)
HGB UR QL STRIP: NEGATIVE
KETONES UR QL STRIP.AUTO: NEGATIVE MG/DL
LEUKOCYTE ESTERASE UR QL STRIP.AUTO: NEGATIVE
MAGNESIUM SERPL-MCNC: 2.1 MG/DL (ref 1.8–2.4)
MCH RBC QN AUTO: 31.2 PG (ref 26.1–32.9)
MCHC RBC AUTO-ENTMCNC: 34.4 G/DL (ref 31.4–35)
MCV RBC AUTO: 90.8 FL (ref 79.6–97.8)
NITRITE UR QL STRIP.AUTO: NEGATIVE
PH UR STRIP: 6.5 [PH] (ref 5–9)
PLATELET # BLD AUTO: 192 K/UL (ref 150–450)
PMV BLD AUTO: 10.7 FL (ref 10.8–14.1)
PROT UR STRIP-MCNC: 30 MG/DL
RBC # BLD AUTO: 2.82 M/UL (ref 4.23–5.67)
RBC #/AREA URNS HPF: ABNORMAL /HPF
SP GR UR REFRACTOMETRY: 1.01 (ref 1–1.02)
UROBILINOGEN UR QL STRIP.AUTO: 0.2 EU/DL (ref 0.2–1)
WBC # BLD AUTO: 8.3 K/UL (ref 4.3–11.1)
WBC URNS QL MICRO: ABNORMAL /HPF

## 2017-08-22 PROCEDURE — 74011000250 HC RX REV CODE- 250: Performed by: NURSE PRACTITIONER

## 2017-08-22 PROCEDURE — 83735 ASSAY OF MAGNESIUM: CPT | Performed by: SURGERY

## 2017-08-22 PROCEDURE — 74011250637 HC RX REV CODE- 250/637: Performed by: HOSPITALIST

## 2017-08-22 PROCEDURE — 74011250636 HC RX REV CODE- 250/636: Performed by: HOSPITALIST

## 2017-08-22 PROCEDURE — 94640 AIRWAY INHALATION TREATMENT: CPT

## 2017-08-22 PROCEDURE — 81001 URINALYSIS AUTO W/SCOPE: CPT | Performed by: HOSPITALIST

## 2017-08-22 PROCEDURE — 36415 COLL VENOUS BLD VENIPUNCTURE: CPT | Performed by: SURGERY

## 2017-08-22 PROCEDURE — 85027 COMPLETE CBC AUTOMATED: CPT | Performed by: SURGERY

## 2017-08-22 PROCEDURE — 74011250637 HC RX REV CODE- 250/637: Performed by: INTERNAL MEDICINE

## 2017-08-22 PROCEDURE — 94760 N-INVAS EAR/PLS OXIMETRY 1: CPT

## 2017-08-22 RX ORDER — METOPROLOL TARTRATE 25 MG/1
25 TABLET, FILM COATED ORAL EVERY 12 HOURS
Qty: 60 TAB | Refills: 2 | Status: SHIPPED | OUTPATIENT
Start: 2017-08-22

## 2017-08-22 RX ORDER — IPRATROPIUM BROMIDE AND ALBUTEROL SULFATE 2.5; .5 MG/3ML; MG/3ML
3 SOLUTION RESPIRATORY (INHALATION)
Status: DISCONTINUED | OUTPATIENT
Start: 2017-08-22 | End: 2017-08-22 | Stop reason: HOSPADM

## 2017-08-22 RX ORDER — HYDRALAZINE HYDROCHLORIDE 50 MG/1
50 TABLET, FILM COATED ORAL 3 TIMES DAILY
Qty: 90 TAB | Refills: 2 | Status: SHIPPED | OUTPATIENT
Start: 2017-08-22

## 2017-08-22 RX ORDER — IPRATROPIUM BROMIDE AND ALBUTEROL SULFATE 2.5; .5 MG/3ML; MG/3ML
3 SOLUTION RESPIRATORY (INHALATION)
Status: DISCONTINUED | OUTPATIENT
Start: 2017-08-22 | End: 2017-08-22

## 2017-08-22 RX ADMIN — HYDRALAZINE HYDROCHLORIDE 50 MG: 50 TABLET ORAL at 17:24

## 2017-08-22 RX ADMIN — IPRATROPIUM BROMIDE AND ALBUTEROL SULFATE 3 ML: 2.5; .5 SOLUTION RESPIRATORY (INHALATION) at 07:40

## 2017-08-22 RX ADMIN — FINASTERIDE 5 MG: 5 TABLET, FILM COATED ORAL at 10:01

## 2017-08-22 RX ADMIN — METOPROLOL TARTRATE 25 MG: 25 TABLET ORAL at 10:02

## 2017-08-22 RX ADMIN — Medication 10 ML: at 14:13

## 2017-08-22 RX ADMIN — BACLOFEN 10 MG: 10 TABLET ORAL at 10:01

## 2017-08-22 RX ADMIN — FERROUS SULFATE TAB 325 MG (65 MG ELEMENTAL FE) 325 MG: 325 (65 FE) TAB at 06:15

## 2017-08-22 RX ADMIN — LEVOTHYROXINE SODIUM 100 MCG: 100 TABLET ORAL at 06:15

## 2017-08-22 RX ADMIN — ASPIRIN 81 MG: 81 TABLET, COATED ORAL at 10:01

## 2017-08-22 RX ADMIN — DOCUSATE SODIUM 100 MG: 100 CAPSULE, LIQUID FILLED ORAL at 10:01

## 2017-08-22 RX ADMIN — BUDESONIDE 500 MCG: 0.5 SUSPENSION RESPIRATORY (INHALATION) at 07:40

## 2017-08-22 RX ADMIN — Medication 10 ML: at 06:15

## 2017-08-22 RX ADMIN — BACLOFEN 10 MG: 10 TABLET ORAL at 17:24

## 2017-08-22 RX ADMIN — DOCUSATE SODIUM 100 MG: 100 CAPSULE, LIQUID FILLED ORAL at 17:24

## 2017-08-22 RX ADMIN — ENOXAPARIN SODIUM 30 MG: 30 INJECTION SUBCUTANEOUS at 14:07

## 2017-08-22 RX ADMIN — IPRATROPIUM BROMIDE AND ALBUTEROL SULFATE 3 ML: 2.5; .5 SOLUTION RESPIRATORY (INHALATION) at 03:19

## 2017-08-22 RX ADMIN — HYDRALAZINE HYDROCHLORIDE 50 MG: 50 TABLET ORAL at 10:01

## 2017-08-22 RX ADMIN — PANTOPRAZOLE SODIUM 40 MG: 40 TABLET, DELAYED RELEASE ORAL at 06:14

## 2017-08-22 NOTE — PROGRESS NOTES
Progress Note    Patient: Nils Ramirez MRN: [id number]  SSN: [id number]    YOB: 1931  Age: 80 y.o. Sex: male      Admit Date: 8/1/2017    LOS: 21 days      PROCEDURE(S):  8/16/2017   1. Endovascular abdominal aortic aneurysm repair ([employer] [place] Exclud[employer]). 2. Bilateral external iliac artery percutaneous transluminal angioplasty. 3. Bilateral femoral cutdown. Subjective:     Patient resting quietly without complaints. Denies incisional pain, dyspnea, LE dysesthesia. Reports flatus, +BM. Tolerating diet. Has been OOB with PT.    Objective:     Vitals:    08/22/17 0740 08/22/17 0755 08/22/17 1223 08/22/17 1532   BP:  127/75 144/68 126/44   Pulse:  93 88 79   Resp:  18 18 18   Temp:  98.8 °F (37.1 °C) 98.9 °F (37.2 °C) 99.3 °F (37.4 °C)   SpO2: 95% 96% 93% 95%   Weight:       Height:            Intake and Output:  Current Shift: 08/22 0701 - 08/22 1900  In: -   Out: 400 [Urine:400]  Last three shifts: 08/20 1901 - 08/22 0700  In: 9868 [P. O.:600; I.V.:594]  Out: 300 [Urine:300]    Physical Exam:   GENERAL: alert, cooperative, no distress, appears stated age  LUNG: clear to auscultation bilaterally  HEART: regular rate and rhythm  ABDOMEN: soft, NTND, +BS; bilateral groin incisions clean / dry / intact with skin glue, mild ecchymosis and induration, no erythema  EXTREMITIES: warm, normal ROM, distal pulses palpable, NV intact    Lab/Data Review: All lab results for the last 24 hours reviewed.        Assessment / Plan:     Principal Problem:    Abdominal aortic aneurysm (AAA) without rupture ([facility] [address] (8/18/2017)    Active Problems:    PAF (paroxysmal atrial fibrillation) ([facility] [address] (8/22/2017)      Continue care per primary, specialists  Clean and inspect groin wounds daily, skin glue will fall off on its own  Follow up in office with Dr. Governor Odonnell in 3 weeks      Signed By: Yue Harding PA-C    August 22, 2017      Physician Assistant with Vascular Surgery Alaina Smith MD / Afsaneh Frost.  Gilmer Álvarez MD / Teri Reilly MD

## 2017-08-22 NOTE — PROGRESS NOTES
Patient receiving 1 unit of blood with no sXsx reaction, no complaints, needs met, hourly rounds completed, Report given to Reji Coleman RN

## 2017-08-22 NOTE — PROGRESS NOTES
Hospitalist Progress Note     Admit Date:  2017 12:17 AM   Name:  Loreto Tan   Age:  80 y.o.  :  10/18/1931   MRN:  [id number]   PCP:  None  Treatment Team: Attending Provider: Nicole Hernández MD; Utilization Review: Britney Mcgovern, RN; Consulting Provider: Suzanna Merchant MD; Utilization Review: Darwin Marin, RN; Care Manager: Geovanna Garcia RN    Subjective:   'Iván freitas 82GTI with HTN, CKD, dementia, hypothyroidism who was admitted on  with UTI, JEB, hypoxia, and pneumonia. Started on abx, has been improving. Has had increased leukocytosis. ID was consulted and abx stopped, leukocytosis attributed to steroids. US found 4cm AAA and found to have high grade L renal artery stenosis. He underwent AAA repair with femoral cut down on  and was monitored in ICU post-op. Doing well since then.     - pt resting comfortably.   No CP, SOB, diarrhea, constipation, or other complaints NOS      Objective:   Patient Vitals for the past 24 hrs:   Temp Pulse Resp BP SpO2   17 1223 98.9 °F (37.2 °C) 88 18 144/68 93 %   17 0755 98.8 °F (37.1 °C) 93 18 127/75 96 %   17 0740 - - - - 95 %   17 0319 99.3 °F (37.4 °C) 86 18 141/42 95 %   17 2306 - - - - 94 %   17 2231 99 °F (37.2 °C) 97 20 146/58 93 %   17 2029 98.5 °F (36.9 °C) 85 18 132/53 95 %   17 1934 - - - - 94 %   17 1855 98.5 °F (36.9 °C) 88 18 155/58 98 %   17 1820 98.7 °F (37.1 °C) 92 18 141/50 95 %   17 1804 99.3 °F (37.4 °C) 89 18 104/58 98 %   17 1654 98 °F (36.7 °C) 96 18 123/68 97 %     Oxygen Therapy  O2 Sat (%): 93 % (17 1223)  Pulse via Oximetry: 68 beats per minute (17 0740)  O2 Device: Room air (17 0740)  O2 Flow Rate (L/min): 0 l/min (17 1512)  FIO2 (%): 21 % (17 0406)    Intake/Output Summary (Last 24 hours) at 17 1234  Last data filed at 17 1034   Gross per 24 hour   Intake              954 ml Output              700 ml   Net              254 ml         General:    Well nourished. Alert. CV:   RRR. No murmur, rub, or gallop. Lungs:   Clear to auscultation bilaterally. No wheezing, rhonchi, or rales. Abdomen:   Soft, nontender, nondistended. Extremities: Warm and dry. No cyanosis or edema. Skin:     No rashes or jaundice. Data Review:  I have reviewed all labs, meds, telemetry events, and studies from the last 24 hours.     Recent Results (from the past 24 hour(s))   TYPE + CROSSMATCH    Collection Time: 08/21/17  1:25 PM   Result Value Ref Range    Crossmatch Expiration 08/24/2017     ABO/Rh(D) O POSITIVE     Antibody screen NEG     Comment       Specimen processed by automated Blood Bank analyzer    Unit number [id number]     Blood component type RC LR AS5     Unit division 00     Status of unit TRANSFUSED     Crossmatch result Compatible    MAGNESIUM    Collection Time: 08/22/17  5:00 AM   Result Value Ref Range    Magnesium 2.1 1.8 - 2.4 mg/dL   CBC W/O DIFF    Collection Time: 08/22/17  5:00 AM   Result Value Ref Range    WBC 8.3 4.3 - 11.1 K/uL    RBC 2.82 (L) 4.23 - 5.67 M/uL    HGB 8.8 (L) 13.6 - 17.2 g/dL    HCT 25.6 (L) 41.1 - 50.3 %    MCV 90.8 79.6 - 97.8 FL    MCH 31.2 26.1 - 32.9 PG    MCHC 34.4 31.4 - 35.0 g/dL    RDW 15.3 (H) 11.9 - 14.6 %    PLATELET 221 697 - 162 K/uL    MPV 10.7 (L) 10.8 - 14.1 FL   URINALYSIS W/ RFLX MICROSCOPIC    Collection Time: 08/22/17  7:45 AM   Result Value Ref Range    Color YELLOW      Appearance CLEAR      Specific gravity 1.014 1.001 - 1.023      pH (UA) 6.5 5.0 - 9.0      Protein 30 (A) NEG mg/dL    Glucose NEGATIVE  mg/dL    Ketone NEGATIVE  NEG mg/dL    Bilirubin NEGATIVE  NEG      Blood NEGATIVE  NEG      Urobilinogen 0.2 0.2 - 1.0 EU/dL    Nitrites NEGATIVE  NEG      Leukocyte Esterase NEGATIVE  NEG      WBC 0-3 0 /hpf    RBC 20-50 0 /hpf    Epithelial cells 0-3 0 /hpf    Bacteria 0 0 /hpf    Casts 0-3 0 /lpf        All Micro Results Procedure Component Value Units Date/Time    CULTURE, URINE [[id number]] Collected:  08/21/17 0740    Order Status:  Completed Specimen:  Urine from Clean catch Updated:  08/22/17 0918    CULTURE, BLOOD [[id number]] Collected:  08/21/17 0918    Order Status:  Completed Specimen:  Blood from Blood Updated:  08/22/17 0811     Special Requests: LEFT ANTECUBITAL        Culture result: NO GROWTH AFTER 22 HOURS       CULTURE, URINE [[id number]] Collected:  08/18/17 2200    Order Status:  Completed Specimen:  Urine from Clean catch Updated:  08/21/17 0636     Special Requests: NO SPECIAL REQUESTS        Culture result: NO GROWTH 2 DAYS       CULTURE, URINE [[id number]] Collected:  08/15/17 0600    Order Status:  Completed Specimen:  Urine from Clean catch Updated:  08/17/17 0742     Special Requests: NO SPECIAL REQUESTS        Culture result: NO GROWTH 2 DAYS       MRSA SCREEN - PCR (NASAL) [[id number]] Collected:  08/16/17 1800    Order Status:  Completed Specimen:  Nasal from Nares Updated:  08/16/17 2006     Special Requests: NO SPECIAL REQUESTS        Culture result:         MRSA target DNA is not detected (presumptive not colonized with MRSA)    CULTURE, BLOOD [[id number]] Collected:  08/06/17 1310    Order Status:  Completed Specimen:  Blood from Blood Updated:  08/11/17 1150     Special Requests: LEFT ANTECUBITAL        Culture result: NO GROWTH 5 DAYS       CULTURE, BLOOD [[id number]] Collected:  08/04/17 1231    Order Status:  Completed Specimen:  Blood from Blood Updated:  08/09/17 0726     Special Requests: RIGHT HAND        Culture result: NO GROWTH 5 DAYS       CULTURE, BLOOD [[id number]] Collected:  08/04/17 1224    Order Status:  Completed Specimen:  Blood from Blood Updated:  08/09/17 0726     Special Requests: LEFT WRIST        Culture result: NO GROWTH 5 DAYS       CULTURE, URINE [[id number]]  (Abnormal)  (Susceptibility) Collected:  08/04/17 1710    Order Status:  Completed Specimen:  Urine from Cath Urine Updated:  08/08/17 0739     Special Requests: NO SPECIAL REQUESTS        Culture result:         3000 COLONIES/mL ENTEROCOCCUS FAECALIS GROUP D (A)          Current Meds:  Current Facility-Administered Medications   Medication Dose Route Frequency    albuterol-ipratropium (DUO-NEB) 2.5 MG-0.5 MG/3 ML  3 mL Nebulization Q6H PRN    0.9% sodium chloride infusion 250 mL  250 mL IntraVENous PRN    oxyCODONE-acetaminophen (PERCOCET) 5-325 mg per tablet 1 Tab  1 Tab Oral Q4H PRN    morphine injection 1 mg  1 mg IntraVENous Q1H PRN    ondansetron (ZOFRAN) injection 4 mg  4 mg IntraVENous Q6H PRN    diphenhydrAMINE (BENADRYL) injection 12.5 mg  12.5 mg IntraVENous Q4H PRN    enoxaparin (LOVENOX) injection 30 mg  30 mg SubCUTAneous Q24H    hydrALAZINE (APRESOLINE) tablet 50 mg  50 mg Oral TID    metoprolol tartrate (LOPRESSOR) tablet 25 mg  25 mg Oral Q12H    atorvastatin (LIPITOR) tablet 40 mg  40 mg Oral QHS    budesonide (PULMICORT) 500 mcg/2 ml nebulizer suspension  500 mcg Nebulization BID RT    sodium chloride (NS) flush 5-10 mL  5-10 mL IntraVENous Q8H    sodium chloride (NS) flush 5-10 mL  5-10 mL IntraVENous PRN    acetaminophen (TYLENOL) tablet 650 mg  650 mg Oral Q4H PRN    ondansetron (ZOFRAN) injection 4 mg  4 mg IntraVENous Q4H PRN    aspirin delayed-release tablet 81 mg  81 mg Oral DAILY    baclofen (LIORESAL) tablet 10 mg  10 mg Oral BID    docusate sodium (COLACE) capsule 100 mg  100 mg Oral BID    ferrous sulfate tablet 325 mg  325 mg Oral ACB    finasteride (PROSCAR) tablet 5 mg  5 mg Oral DAILY    levothyroxine (SYNTHROID) tablet 100 mcg  100 mcg Oral ACB    nicotine (NICODERM CQ) 7 mg/24 hr patch 1 Patch  1 Patch TransDERmal Q24H    pantoprazole (PROTONIX) tablet 40 mg  40 mg Oral ACB       Other Studies (last 24 hours):  No results found.     Assessment and Plan:     Hospital Problems as of 8/22/2017  Never Reviewed          Codes Class Noted - Resolved POA    PAF (paroxysmal atrial fibrillation) ([facility] [address] ICD-10-CM: I48.0  ICD-9-CM: 427.31  8/22/2017 - Present Yes        * (Principal)Abdominal aortic aneurysm (AAA) without rupture ([facility] [address] ICD-10-CM: I71.4  ICD-9-CM: 441.4  8/18/2017 - Present Yes        RESOLVED: Enterococcus UTI ICD-10-CM: N39.0, B95.2  ICD-9-CM: 599.0, 041.04  8/18/2017 - 8/22/2017 Yes        RESOLVED: Acute metabolic encephalopathy [facility]-93-TN: G93.41  ICD-9-CM: 348.31  8/1/2017 - 8/22/2017 Yes        RESOLVED: Fever ICD-10-CM: R50.9  ICD-9-CM: 780.60  8/1/2017 - 8/22/2017 Yes              PLAN:    · Fevers resolved. Monitor. UA negative. WBC wnl. Cultures NGTD; follow  · Cont metoprolol for PAF. NSR now. Not a good AC candidate at this time due to recent abd surgery, anemia. FOBT neg. Will need to follow up with cardiology in office as outpatient. · AAA s/p repair 8/16 with bilateral femoral cutdown. Stable. DC planning/Dispo:  Awaiting pre-cert for SNF.   Stable for discharge  DVT ppx:  lovenox    Signed:  Nhi Jane MD

## 2017-08-22 NOTE — DISCHARGE SUMMARY
Hospitalist Discharge Summary     Admit Date:  2017 12:17 AM   Name:  Wale Ruiz   Age:  80 y.o.  :  10/18/1931   MRN:  [id number]   PCP:  None  Treatment Team: Utilization Review: Davion Nielson RN; Consulting Provider: Clarita Savage MD; Utilization Review: Patrice Oh RN; Care Manager: General Record, RN    Problem List for this Hospitalization:  Hospital Problems as of 2017  Never Reviewed          Codes Class Noted - Resolved POA    PAF (paroxysmal atrial fibrillation) ([facility]) ICD-10-CM: I48.0  ICD-9-CM: 427.31  2017 - Present Yes        * (Principal)Abdominal aortic aneurysm (AAA) without rupture ([facility] [address] ICD-10-CM: I71.4  ICD-9-CM: 441.4  2017 - Present Yes        RESOLVED: Enterococcus UTI ICD-10-CM: N39.0, B95.2  ICD-9-CM: 599.0, 041.04  2017 - 2017 Yes        RESOLVED: Acute metabolic encephalopathy HIZ-47-: G93.41  ICD-9-CM: 348.31  2017 - 2017 Yes        RESOLVED: Fever ICD-10-CM: R50.9  ICD-9-CM: 780.60  2017 - 2017 Yes                Admission HPI from 2017:    Sylver.Anon y/o male with history of HTN, hypothyroidism, \"renal disorder\" and dementia is sent in transfer from [address] [place] [address] with diagnosis of UTI, fever, worsening renal failure, hypoxemia and URI/pna. He is alert but a poor historian. His main complaint is of shivering. No family or visitors present to give more history. He states he lives with family. Workup at [address] [place] [address] showed a UTI, temp 100.7, O2 sat 90% RA with PaO2 69. Cr up to 1.8 from 1.3. WBC ct 13.1, lactic acid 1.5 and . Chest xray was reported as normal. Do not know what patient's mental status baseline is to know if he is having delerium from acute illness or not. He was given IVF, rocephin and azithromycin and transferred to [address] [place] [address]. Patient denies chest pain, shortness of breath, nausea or vomiting. Has has malaise, weakness, cough and congestion.    \"    Hospital Course:  85yoM with HTN, CKD, dementia, hypothyroidism who was admitted on 8/1 with UTI, JEB, hypoxia, and pneumonia. Home ACEi and HCTZ were stopped due to mild kidney insufficiency. Hydralazine and metoprolol started instead. Started on abx for UTI and completed course. Had recurrence of leukocytosis; ID was consulted and abx stopped, leukocytosis attributed to steroids. US found 4cm AAA and found to have high grade L renal artery stenosis. He underwent AAA repair with femoral cut down on 8/16 and was monitored in ICU post-op.  had new onset PAF which spontaneously converted to NSR. Cont metoprolol. Not a good AC candidate at this time due to age, fall risk, recent abd vascular surgery, chronic anemia. His PAF also spontaneously converted and episode could have arguably been due to factors  which have since been resolved. FOBT neg. if he has recurrence of afib may need to consider [facility] in future but can follow up with cardiology for this if considering. Follow up instructions below. Plan was discussed with pt. All questions answered. Patient was stable at time of discharge and was instructed to call or return if there are any concerns or recurrence of symptoms. Diagnostic Imaging/Tests:   XR SHOULDER LT AP/LAT MIN 2 V (Final result) Result time: 08/20/17 19:24:41     Final result     Impression:     IMPRESSION: Postoperative changes without evidence of acute bony abnormality.     Narrative:     Left shoulder series    HISTORY: Pain x2 days after fall. 3 views of the left shoulder were obtained. No prior studies are available for  comparison. FINDINGS: The patient is status post reverse shoulder arthroplasty. Alignment is  anatomic. There is no acute fracture or dislocation.  The acromioclavicular joint  is intact.                 XR KNEE LT 3 V (Final result) Result time: 08/20/17 19:25:37     Final result     Impression:     IMPRESSION: No evidence of acute bony abnormality.     Narrative:     Left knee series    HISTORY: Pain after fall 2 days ago. 3 views of the left knee were obtained. No prior studies are available for  comparison. FINDINGS: There is no evidence of acute fracture or dislocation. There is no  significant joint effusion. The joint spaces are well-preserved. Surgical clips  are present posteromedial. Mild vascular calcifications are present.                 IR FLUOROSCOPY < 1 HOUR (Final result) Result time: 08/17/17 09:31:43     Final result     Narrative:     Administrative Report    This report can be viewed in [facility] and/or retrieved by the Medical  Records department.                 XR CHEST PORT (Final result) Result time: 08/10/17 18:16:19     Final result     Impression:     IMPRESSION: Interstitial prominence which may represent edema. Small patchy  density partially obscures right hemidiaphragm. There are sternal wires.              Narrative:     CHEST X-RAY, one view. HISTORY:  Coronary artery disease.      TECHNIQUE:  AP upright portable view. COMPARISON: 4 August 2017                 CTA ABD ART W RUNOFF W WO CONT (Final result) Result time: 08/04/17 16:08:45     Final result     Impression:     IMPRESSION:  1. Infrarenal abdominal aortic aneurysm with saccular component measuring 4.0, x  6.7 cm. 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 2. High-grade left renal artery stenosis. 3. Right lower extremity: Moderate stenosis of the common femoral artery and  diffuse high-grade disease of SFA. 4. Left lower extremity: Occlusion of SFA reconstitution at the popliteal artery  level. 5. Bilateral pleural effusions. Cholelithiasis. Recommend vascular surgical consultation. DC4  The significant findings in this report have been referred to the Imaging  Navigator in order to communicate to the referring provider or his/her designee  as outlined in Section II.C.2.a.ii-iii of the ACR  Practice Guideline for Communication of Diagnostic Imaging Findings.       .     Narrative:     CTA of abdomen, pelvis and lower extremities. INDICATION: Ultrasound found on renal ultrasound. COMPARISON: Renal ultrasound 8/3/2017. Contrast: 150 cc Isovue-[id number]. FINDINGS: Axial images were performed from the diaphragm through the ankle  level. Multiplanar reformatting as well as 3-D reformatting were performed and  reviewed on a dedicated workstation. Automatic dose reduction protocol was used. The abdominal aorta is patent. Below the takeoff of the renal arteries there is  enlargement of the abdominal aorta with a somewhat saccular component to the  right of the main flow lumen. The aneurysm measures 4.0 x 6.7 cm the aorta is of  relatively normal size and contour at the level of the iliac arteries. The SMA  and celiac axis are widely patent. There is a moderate stenosis of the proximal  right renal artery. There is a high-grade stenosis of the left renal artery. It  is patent but is high-grade. The iliac arteries are patent. There is a moderate stenosis of the distal common  iliac artery. The right lower extremity shows bulky plaque at the common femoral artery. There  is a moderate stenosis at this level. The SFA shows diffuse disc disease with  areas of moderate and high-grade stenoses. The popliteal artery is patent. There  is relatively good 2 vessel runoff to the level of the ankle via the peroneal  and anterior tibial arteries. The posterior tibial artery appears largely  occluded. Michelle graph the left lower extremity mild stenosis of the common femoral  artery. The SFA is occluded proximally. The profunda femoris artery is patent. The popliteal artery reconstitutes. There is relatively good 2 vessel runoff to  the level of the ankle via the peroneal artery and anterior tibial artery. Upon review of the source images the lungs show chronic appearing changes in the  bases. In addition there are bilateral pleural effusions right being larger than  the left.  The liver shows no gross masses. The gallbladder is relatively small  with a single appearing radiopaque stone measuring approximately 9 mm in  diameter. The spleen and pancreas show no abnormal masses. The adrenal glands  and kidneys show no abnormal masses. There are simple cyst of the left kidney  measuring 2.5 cm in diameter. The bowel is unopacified as per CTA protocol. The  bones are generally osteopenic. The spine shows mild degenerative change.                 XR CHEST SNGL V (Final result) Result time: 08/04/17 14:06:46     Final result     Impression:     IMPRESSION:   1.  Stable cardiomegaly without evolving acute changes seen.           Narrative:     CHEST X-RAY, single portable view  8/4/2017    History: Shortness of breath and pneumonia. Technique: Single frontal view of the chest.    Comparison: Chest x-ray 6/12/2017    Findings:   Stable post surgical changes overlie the mediastinal silhouette. A left shoulder  replacement is once again seen. Stable mild cardiomegaly is seen. Lungs are  expanded without pneumothorax. No evolving consolidation, or pleural effusion is  seen. Only stable likely chronic interstitial prominence is seen most evident at  the lung bases.                 US RETROPERITONEUM COMP (Final result) Result time: 08/03/17 12:27:41     Final result     Impression:     IMPRESSION:     1) A 4.1 cm abdominal aortic aneurysm with peripheral thrombus. 2) Negative for hydronephrosis. Abnormally increased renal echogenicity. 3) Simple renal cysts.         Narrative:     RENAL ULTRASOUND. HISTORY: Acute Renal Failure. COMPARISON: None. TECHNIQUE: Direct skin contact sector 3-5 MHz images of the kidneys are  obtained. FINDINGS: Renal echogenicity is increased; the right kidney is hyperechoic  compared to the liver. Right kidney: 12.8 cm in length. No hydronephrosis. 13 mm simple appearing cyst  midpole.     Left kidney: 11.6 cm in length.  No hydronephrosis.  3.1 cm anechoic cyst  complicated by a thin internal septations. Limited views of bladder unremarkable. Abdominal aorta is dilated, 4.1 cm. Peripheral echoes suggest thrombus. radrsltadm:  No results found. risrsltadm:  No results found. Echocardiogram results:  Results for orders placed or performed during the hospital encounter of 17   2D ECHO COMPLETE ADULT (TTE) W OR [address] [facility] [address] [facility], [address] [facility]  [phone]    Transthoracic Echocardiogram  2D, M-mode, Doppler, and Color Doppler    Patient: Veronica Gil  MR #: [id number]  : 18-Oct-1931  Age: 80 years  Gender: Male  Study date: 07-Aug-2017  Account #: [de-identified]  Height: 64 in  Weight: 146.7 lb  BSA: 1.72 mï¾²  Status:Routine  Location: 836  BP: 155/ 66    Allergies: NO KNOWN ALLERGIES    Sonographer:  Brie Otero RDCS  Group:  [facility]  Referring Physician:  Luke Kraus. Jimmy uQevedo MD [facility]  Reading Physician:  AMBER Gama MD [facility]    INDICATIONS: Cardiomegaly. PROCEDURE: This was a routine study. A transthoracic echocardiogram was  performed. The study included complete 2D imaging, M-mode, complete spectral  Doppler, and color Doppler. Intravenous contrast (Definity) was administered. Image quality was good. LEFT VENTRICLE: Size was normal. Systolic function was normal. Ejection  fraction was estimated in the range of 60 % to 65 %. There were no regional  wall motion abnormalities. There was mild concentric hypertrophy. Left  ventricular diastolic function parameters suggest diastolic dysfunction. RIGHT VENTRICLE: The size was normal. Systolic function was normal. There was  mild pulmonary artery hypertension. Estimated peak pressure was in the range   of  40-45 mmHg. LEFT ATRIUM: The atrium was moderately to markedly dilated. RIGHT ATRIUM: The atrium was moderately dilated.     SYSTEMIC VEINS: IVC: The inferior vena cava was normal in size and course. AORTIC VALVE: The valve was trileaflet. Leaflets exhibited mild to moderate  calcification and reduced mobility. The aortic valve area by the continuity  equation was 2.05 cm2. The peak velocity was 3.21 m/s. The mean pressure  gradient was 18 mmHg. The findings were most consistent with mild aortic  stenosis. The peak pressure gradient was 41 mmHg. There was moderate  regurgitation. MITRAL VALVE: There was mild annular calcification. The mitral valve area by  pressure half time was 2.86 cm2. The peak velocity was 1.52 m/s. The mean  pressure gradient was 4 mmHg. The peak pressure gradient was 8.6 mmHg. There  was mild to moderate regurgitation. TRICUSPID VALVE: The valve structure was normal. There was no evidence for  stenosis. There was mild regurgitation. PULMONIC VALVE: Not well visualized. There was no evidence for stenosis. There  was no insufficiency. PERICARDIUM: There was no pericardial effusion. AORTA: The root exhibited normal size. SUMMARY:    -  Left ventricle: Systolic function was normal. Ejection fraction was  estimated in the range of 60 % to 65 %. There were no regional wall motion  abnormalities. There was mild concentric hypertrophy. Left ventricular  diastolic function parameters suggest diastolic dysfunction.    -  Right ventricle: There was mild pulmonary artery hypertension.    -  Left atrium: The atrium was moderately to markedly dilated. -  Right atrium: The atrium was moderately dilated. -  Aortic valve: The valve was trileaflet. Leaflets exhibited mild to   moderate  calcification and reduced mobility. There was moderate regurgitation. The  aortic valve area by the continuity equation was 2.05 cm2. The mean pressure  gradient was 18 mmHg. The findings were most consistent with mild aortic  stenosis. -  Mitral valve: There was mild annular calcification. There was mild to  moderate regurgitation.    -  Tricuspid valve:  There was mild regurgitation. SYSTEM MEASUREMENT TABLES    2D mode  AoR Diam (2D): 3.7 cm  LA Dimension (2D): 5.4 cm  Left Atrium Systolic Volume Index; Method of Disks, Biplane; 2D mode;: 57.6  ml/m2  IVS/LVPW (2D): 1  IVSd (2D): 1.2 cm  LVIDd (2D): 4.6 cm  LVIDs (2D): 3.4 cm  LVOT Area (2D): 3.5 cm2  LVPWd (2D): 1.2 cm    Unspecified Scan Mode  Peak Grad; Mean; Antegrade Flow: 32 mm[Hg]  Vmax; Antegrade Flow: 275 cm/s  LVOT Diam: 2.1 cm    Prepared and signed by    AMBER Nicole MD [facility]  Signed 07-Aug-2017 14:45:33           All Micro Results     Procedure Component Value Units Date/Time    CULTURE, URINE [[id number]] Collected:  08/21/17 0740    Order Status:  Completed Specimen:  Urine from Clean catch Updated:  08/24/17 0744     Special Requests: NO SPECIAL REQUESTS        Culture result:       1000  COLONIES/mL  NORMAL SKIN HANNA ISOLATED      CULTURE, BLOOD [[id number]] Collected:  08/21/17 0918    Order Status:  Completed Specimen:  Blood from Blood Updated:  08/24/17 0700     Special Requests: LEFT ANTECUBITAL        Culture result: NO GROWTH 3 DAYS       CULTURE, URINE [[id number]] Collected:  08/18/17 2200    Order Status:  Completed Specimen:  Urine from Clean catch Updated:  08/21/17 0636     Special Requests: NO SPECIAL REQUESTS        Culture result: NO GROWTH 2 DAYS       CULTURE, URINE [[id number]] Collected:  08/15/17 0600    Order Status:  Completed Specimen:  Urine from Clean catch Updated:  08/17/17 0742     Special Requests: NO SPECIAL REQUESTS        Culture result: NO GROWTH 2 DAYS       MRSA SCREEN - PCR (NASAL) [[id number]] Collected:  08/16/17 1800    Order Status:  Completed Specimen:  Nasal from Nares Updated:  08/16/17 2006     Special Requests: NO SPECIAL REQUESTS        Culture result:         MRSA target DNA is not detected (presumptive not colonized with MRSA)    CULTURE, BLOOD [[id number]] Collected:  08/06/17 1310    Order Status:  Completed Specimen:  Blood from Blood Updated:  08/11/17 1150 Special Requests: LEFT ANTECUBITAL        Culture result: NO GROWTH 5 DAYS       CULTURE, BLOOD [[id number]] Collected:  08/04/17 1231    Order Status:  Completed Specimen:  Blood from Blood Updated:  08/09/17 0726     Special Requests: RIGHT HAND        Culture result: NO GROWTH 5 DAYS       CULTURE, BLOOD [[id number]] Collected:  08/04/17 1224    Order Status:  Completed Specimen:  Blood from Blood Updated:  08/09/17 0726     Special Requests: LEFT WRIST        Culture result: NO GROWTH 5 DAYS       CULTURE, URINE [[id number]]  (Abnormal)  (Susceptibility) Collected:  08/04/17 1710    Order Status:  Completed Specimen:  Urine from Cath Urine Updated:  08/08/17 0739     Special Requests: NO SPECIAL REQUESTS        Culture result:         3000 COLONIES/mL ENTEROCOCCUS FAECALIS GROUP D (A)          Labs: Results:       BMP, Mg, Phos Recent Labs      08/22/17   0500   MG  2.1      CBC Recent Labs      08/22/17   0500   WBC  8.3   RBC  2.82*   HGB  8.8*   HCT  25.6*   PLT  192      LFT No results for input(s): SGOT, ALT, TBIL, AP, TP, ALB, GLOB, AGRAT, GPT in the last 72 hours.    Cardiac Testing Lab Results   Component Value Date/Time     06/12/2017 06:16 PM    Troponin-I, Qt. <0.04 06/12/2017 06:16 PM    Troponin-I, Qt. 0.02 03/08/2017 05:17 PM      Coagulation Tests Lab Results   Component Value Date/Time    Prothrombin time 10.7 08/16/2017 06:24 AM    Prothrombin time 10.6 03/08/2017 05:17 PM    INR 1.0 08/16/2017 06:24 AM    INR 1.0 03/08/2017 05:17 PM    aPTT 27.3 03/08/2017 05:17 PM      A1c No results found for: HBA1C, HGBE8, SQT3LPSQ, LAO8QHCU   Lipid Panel No results found for: CHOL, CHOLPOCT, CHOLX, CHLST, CHOLV, 804495, HDL, LDL, LDLC, DLDLP, 677908, VLDLC, VLDL, TGLX, TRIGL, TRIGP, TGLPOCT, CHHD, [facility]   Thyroid Panel Lab Results   Component Value Date/Time    TSH 0.378 08/01/2017 06:20 AM    TSH 1.876 06/12/2017 06:16 PM        Most Recent UA Lab Results   Component Value Date/Time    Color YELLOW 08/22/2017 07:45 AM    Appearance CLEAR 08/22/2017 07:45 AM    Specific gravity 1.014 08/22/2017 07:45 AM    pH (UA) 6.5 08/22/2017 07:45 AM    Protein 30 08/22/2017 07:45 AM    Glucose NEGATIVE  08/22/2017 07:45 AM    Ketone NEGATIVE  08/22/2017 07:45 AM    Bilirubin NEGATIVE  08/22/2017 07:45 AM    Blood NEGATIVE  08/22/2017 07:45 AM    Urobilinogen 0.2 08/22/2017 07:45 AM    Nitrites NEGATIVE  08/22/2017 07:45 AM    Leukocyte Esterase NEGATIVE  08/22/2017 07:45 AM        No Known Allergies  Immunization History   Administered Date(s) Administered    TB Skin Test (PPD) Intradermal 08/01/2017       All Labs from Last 24 Hrs:  No results found for this or any previous visit (from the past 24 hour(s)). Discharge Exam:  No data found. Oxygen Therapy  O2 Sat (%): 95 % (08/22/17 1532)  Pulse via Oximetry: 68 beats per minute (08/22/17 0740)  O2 Device: Room air (08/22/17 0740)  O2 Flow Rate (L/min): 0 l/min (08/17/17 1512)  FIO2 (%): 21 % (08/21/17 0406)  No intake or output data in the 24 hours ending 08/24/17 0922    General:    Well nourished. Alert. No distress. Eyes:   Normal sclera. Extraocular movements intact. ENT:  Normocephalic, atraumatic. Moist mucous membranes  CV:   Regular rate and rhythm. No murmur, rub, or gallop. Lungs:  Clear to auscultation bilaterally. No wheezing, rhonchi, or rales. Abdomen: Soft, nontender, nondistended. Bowel sounds normal.   Extremities: Warm and dry. No cyanosis or edema. Neurologic: CN II-XII grossly intact. Sensation intact. Skin:     No rashes or jaundice. Psych:  Normal mood and affect. Discharge Info:   Discharge Medication List as of 8/22/2017  7:21 PM      START taking these medications    Details   metoprolol tartrate (LOPRESSOR) 25 mg tablet Take 1 Tab by mouth every twelve (12) hours. , Print, Disp-60 Tab, R-2      hydrALAZINE (APRESOLINE) 50 mg tablet Take 1 Tab by mouth three (3) times daily. , Print, Disp-90 Tab, R-2         CONTINUE these medications which have NOT CHANGED    Details   aspirin delayed-release 81 mg tablet Take 81 mg by mouth daily. Indications: myocardial infarction prevention, Myocardial Reinfarction Prevention, Historical Med      baclofen (LIORESAL) 10 mg tablet Take 10 mg by mouth two (2) times a day., Historical Med      ferrous sulfate 325 mg (65 mg iron) tablet Take 325 mg by mouth Daily (before breakfast). , Historical Med      omeprazole (PRILOSEC) 20 mg capsule Take 20 mg by mouth daily. , Historical Med      docusate sodium (COLACE) 100 mg capsule Take 100 mg by mouth two (2) times a day., Historical Med      levothyroxine (SYNTHROID) 100 mcg tablet Take 100 mcg by mouth Daily (before breakfast). , Historical Med      finasteride (PROSCAR) 5 mg tablet Take 5 mg by mouth daily. , Historical Med      nicotine (NICODERM CQ) 7 mg/24 hr 1 Patch by TransDERmal route every twenty-four (24) hours. , Historical Med      diclofenac (VOLTAREN) 1 % gel Apply  to affected area four (4) times daily. , Historical Med         STOP taking these medications       meloxicam (MOBIC) 7.5 mg tablet Comments:   Reason for Stopping:         hydroCHLOROthiazide (HYDRODIURIL) 25 mg tablet Comments:   Reason for Stopping:         lisinopril (PRINIVIL, ZESTRIL) 40 mg tablet Comments:   Reason for Stopping:                 Disposition: SNF    Activity: PT/OT Eval and Treat  Diet:      No orders of the defined types were placed in this encounter. Follow-up Information     Follow up With Details Comments Contact Info    [address] [facility] today rehab [address] [place] [address] 458  [address] [facility]  [phone]      PCP  when discharged from rehab     Jose Roberto Rodriguez MD In 3 weeks s/p EVAR [address] [facility]  Vascular [address] [facility] [facility]  [phone]            Time spent in patient discharge planning and coordination 35 minutes.     Signed:  Tunde Smith MD

## 2017-08-22 NOTE — PROGRESS NOTES
TRANSFER - OUT REPORT:    Verbal report given to [facility]  on Merary Solano  being transferred to ***(unit) for {TRANSFER CARE:07666}       Report consisted of patients Situation, Background, Assessment and   Recommendations(SBAR). Information from the following report(s) {SBAR REPORTS DSU} was reviewed with the receiving nurse. Lines:   Peripheral IV 17 Right Forearm (Active)   Site Assessment Clean, dry, & intact 2017  2:09 PM   Phlebitis Assessment 0 2017  2:09 PM   Infiltration Assessment 0 2017  2:09 PM   Dressing Status Clean, dry, & intact 2017  2:09 PM   Dressing Type Tape;Transparent 2017  2:09 PM   Hub Color/Line Status Flushed;Capped 2017  2:09 PM   Alcohol Cap Used No 2017  2:09 PM        Opportunity for questions and clarification was provided.       Patient transported with:   {TRANSPORTDETAILS:64457}

## 2017-08-22 NOTE — INTERDISCIPLINARY ROUNDS
Interdisciplinary team rounds were held 8/22/2017 with the following team members:Care Management, Nursing, Pharmacy, Physical Therapy and Physician. Plan of care discussed. See clinical pathway and/or care plan for interventions and desired outcomes. Awaiting insurance approval for discharge to [facility].

## 2017-08-22 NOTE — PROGRESS NOTES
Transfer to [facility] at 5:30. Shanna Allen. Called and spoke to daughter in law and then to son. They are agreeable to the plan. Mandeep Baxter      Phone Numbers   Daughter in [address]  [phone]    Bennett Coelho  H: [phone]                                C: [phone]

## 2017-08-23 LAB
ABO + RH BLD: NORMAL
BLD PROD TYP BPU: NORMAL
BLOOD BANK CMNT PATIENT-IMP: NORMAL
BLOOD GROUP ANTIBODIES SERPL: NORMAL
BPU ID: NORMAL
CROSSMATCH RESULT,%XM: NORMAL
SPECIMEN EXP DATE BLD: NORMAL
STATUS OF UNIT,%ST: NORMAL
UNIT DIVISION, %UDIV: 0

## 2017-08-24 PROBLEM — N39.0 SEPSIS SECONDARY TO UTI (HCC): Status: RESOLVED | Noted: 2017-08-24 | Resolved: 2017-08-24

## 2017-08-24 PROBLEM — A41.9 SEPSIS SECONDARY TO UTI (HCC): Status: RESOLVED | Noted: 2017-08-24 | Resolved: 2017-08-24

## 2017-08-24 PROBLEM — A41.9 SEPSIS SECONDARY TO UTI (HCC): Status: ACTIVE | Noted: 2017-08-24

## 2017-08-24 PROBLEM — N39.0 SEPSIS SECONDARY TO UTI (HCC): Status: ACTIVE | Noted: 2017-08-24

## 2017-08-24 LAB
BACTERIA SPEC CULT: NORMAL
SERVICE CMNT-IMP: NORMAL

## 2017-08-26 LAB
BACTERIA SPEC CULT: NORMAL
SERVICE CMNT-IMP: NORMAL

## 2017-10-17 ENCOUNTER — HOSPITAL ENCOUNTER (OUTPATIENT)
Dept: LAB | Age: 82
Discharge: HOME | End: 2017-10-17

## 2017-10-17 LAB
ANION GAP SERPL CALC-SCNC: 9 MMOL/L (ref 7–16)
APPEARANCE UR: ABNORMAL
BACTERIA URNS QL MICRO: ABNORMAL /HPF
BILIRUB UR QL: NEGATIVE
BUN SERPL-MCNC: 65 MG/DL (ref 8–23)
CALCIUM SERPL-MCNC: 8.3 MG/DL (ref 8.3–10.4)
CASTS URNS QL MICRO: ABNORMAL /LPF
CHLORIDE SERPL-SCNC: 105 MMOL/L (ref 98–107)
CO2 SERPL-SCNC: 26 MMOL/L (ref 21–32)
COLOR UR: YELLOW
CREAT SERPL-MCNC: 3.28 MG/DL (ref 0.8–1.5)
EPI CELLS #/AREA URNS HPF: ABNORMAL /HPF
ERYTHROCYTE [DISTWIDTH] IN BLOOD BY AUTOMATED COUNT: 15.2 % (ref 11.9–14.6)
GLUCOSE SERPL-MCNC: 114 MG/DL (ref 65–100)
GLUCOSE UR STRIP.AUTO-MCNC: NEGATIVE MG/DL
HCT VFR BLD AUTO: 28.3 % (ref 41.1–50.3)
HGB BLD-MCNC: 9.4 G/DL (ref 13.6–17.2)
HGB UR QL STRIP: ABNORMAL
KETONES UR QL STRIP.AUTO: NEGATIVE MG/DL
LEUKOCYTE ESTERASE UR QL STRIP.AUTO: ABNORMAL
MCH RBC QN AUTO: 29.6 PG (ref 26.1–32.9)
MCHC RBC AUTO-ENTMCNC: 33.2 G/DL (ref 31.4–35)
MCV RBC AUTO: 89 FL (ref 79.6–97.8)
NITRITE UR QL STRIP.AUTO: NEGATIVE
PH UR STRIP: 5.5 [PH] (ref 5–9)
PLATELET # BLD AUTO: 169 K/UL (ref 150–450)
PMV BLD AUTO: 10.7 FL (ref 10.8–14.1)
POTASSIUM SERPL-SCNC: 4.4 MMOL/L (ref 3.5–5.1)
PROT UR STRIP-MCNC: 100 MG/DL
RBC # BLD AUTO: 3.18 M/UL (ref 4.23–5.67)
RBC #/AREA URNS HPF: ABNORMAL /HPF
SODIUM SERPL-SCNC: 140 MMOL/L (ref 136–145)
SP GR UR REFRACTOMETRY: 1.01 (ref 1–1.02)
UROBILINOGEN UR QL STRIP.AUTO: 0.2 EU/DL (ref 0.2–1)
WBC # BLD AUTO: 7.5 K/UL (ref 4.3–11.1)
WBC URNS QL MICRO: >100 /HPF

## 2017-10-17 PROCEDURE — 81001 URINALYSIS AUTO W/SCOPE: CPT | Performed by: INTERNAL MEDICINE

## 2017-10-17 PROCEDURE — 85027 COMPLETE CBC AUTOMATED: CPT | Performed by: INTERNAL MEDICINE

## 2017-10-17 PROCEDURE — 80048 BASIC METABOLIC PNL TOTAL CA: CPT | Performed by: INTERNAL MEDICINE

## 2018-01-05 ENCOUNTER — HOSPITAL ENCOUNTER (INPATIENT)
Age: 83
LOS: 6 days | Discharge: NURSING, REHAB OR LONG-TERM CARE | DRG: 689 | End: 2018-01-11
Attending: EMERGENCY MEDICINE | Admitting: HOSPITALIST
Payer: MEDICARE

## 2018-01-05 ENCOUNTER — APPOINTMENT (OUTPATIENT)
Dept: CT IMAGING | Age: 83
DRG: 689 | End: 2018-01-05
Attending: EMERGENCY MEDICINE
Payer: MEDICARE

## 2018-01-05 ENCOUNTER — APPOINTMENT (OUTPATIENT)
Dept: GENERAL RADIOLOGY | Age: 83
DRG: 689 | End: 2018-01-05
Attending: EMERGENCY MEDICINE
Payer: MEDICARE

## 2018-01-05 DIAGNOSIS — N30.00 ACUTE CYSTITIS WITHOUT HEMATURIA: Primary | ICD-10-CM

## 2018-01-05 PROBLEM — N39.0 ACUTE UTI (URINARY TRACT INFECTION): Status: ACTIVE | Noted: 2018-01-05

## 2018-01-05 PROBLEM — R41.0 CONFUSION: Status: ACTIVE | Noted: 2018-01-05

## 2018-01-05 LAB
ALBUMIN SERPL-MCNC: 2.8 G/DL (ref 3.2–4.6)
ALBUMIN/GLOB SERPL: 0.6 {RATIO} (ref 1.2–3.5)
ALP SERPL-CCNC: 70 U/L (ref 50–136)
ALT SERPL-CCNC: 8 U/L (ref 12–65)
ANION GAP SERPL CALC-SCNC: 7 MMOL/L (ref 7–16)
AST SERPL-CCNC: 7 U/L (ref 15–37)
ATRIAL RATE: 125 BPM
BACTERIA URNS QL MICRO: ABNORMAL /HPF
BASOPHILS # BLD: 0 K/UL (ref 0–0.2)
BASOPHILS NFR BLD: 1 % (ref 0–2)
BILIRUB SERPL-MCNC: 0.3 MG/DL (ref 0.2–1.1)
BUN SERPL-MCNC: 32 MG/DL (ref 8–23)
CALCIUM SERPL-MCNC: 8.2 MG/DL (ref 8.3–10.4)
CALCULATED P AXIS, ECG09: 47 DEGREES
CALCULATED R AXIS, ECG10: 28 DEGREES
CALCULATED T AXIS, ECG11: 1 DEGREES
CASTS URNS QL MICRO: 0 /LPF
CHLORIDE SERPL-SCNC: 106 MMOL/L (ref 98–107)
CO2 SERPL-SCNC: 29 MMOL/L (ref 21–32)
CREAT SERPL-MCNC: 1.42 MG/DL (ref 0.8–1.5)
CRYSTALS URNS QL MICRO: 0 /LPF
DIAGNOSIS, 93000: NORMAL
DIFFERENTIAL METHOD BLD: ABNORMAL
EOSINOPHIL # BLD: 0.2 K/UL (ref 0–0.8)
EOSINOPHIL NFR BLD: 3 % (ref 0.5–7.8)
EPI CELLS #/AREA URNS HPF: ABNORMAL /HPF
ERYTHROCYTE [DISTWIDTH] IN BLOOD BY AUTOMATED COUNT: 15.8 % (ref 11.9–14.6)
GLOBULIN SER CALC-MCNC: 4.4 G/DL (ref 2.3–3.5)
GLUCOSE SERPL-MCNC: 85 MG/DL (ref 65–100)
HCT VFR BLD AUTO: 31.8 % (ref 41.1–50.3)
HGB BLD-MCNC: 10.6 G/DL (ref 13.6–17.2)
IMM GRANULOCYTES # BLD: 0 K/UL (ref 0–0.5)
IMM GRANULOCYTES NFR BLD AUTO: 0 % (ref 0–5)
LYMPHOCYTES # BLD: 2 K/UL (ref 0.5–4.6)
LYMPHOCYTES NFR BLD: 34 % (ref 13–44)
MAGNESIUM SERPL-MCNC: 1.8 MG/DL (ref 1.8–2.4)
MCH RBC QN AUTO: 29.5 PG (ref 26.1–32.9)
MCHC RBC AUTO-ENTMCNC: 33.3 G/DL (ref 31.4–35)
MCV RBC AUTO: 88.6 FL (ref 79.6–97.8)
MONOCYTES # BLD: 0.6 K/UL (ref 0.1–1.3)
MONOCYTES NFR BLD: 10 % (ref 4–12)
MUCOUS THREADS URNS QL MICRO: 0 /LPF
NEUTS SEG # BLD: 3 K/UL (ref 1.7–8.2)
NEUTS SEG NFR BLD: 52 % (ref 43–78)
OTHER OBSERVATIONS,UCOM: ABNORMAL
P-R INTERVAL, ECG05: 176 MS
PLATELET # BLD AUTO: 173 K/UL (ref 150–450)
PMV BLD AUTO: 10.8 FL (ref 10.8–14.1)
POTASSIUM SERPL-SCNC: 3.7 MMOL/L (ref 3.5–5.1)
PROT SERPL-MCNC: 7.2 G/DL (ref 6.3–8.2)
Q-T INTERVAL, ECG07: 354 MS
QRS DURATION, ECG06: 72 MS
QTC CALCULATION (BEZET), ECG08: 389 MS
RBC # BLD AUTO: 3.59 M/UL (ref 4.23–5.67)
RBC #/AREA URNS HPF: ABNORMAL /HPF
SODIUM SERPL-SCNC: 142 MMOL/L (ref 136–145)
TROPONIN I BLD-MCNC: 0.01 NG/ML (ref 0.02–0.05)
VENTRICULAR RATE, ECG03: 73 BPM
WBC # BLD AUTO: 5.9 K/UL (ref 4.3–11.1)
WBC URNS QL MICRO: >100 /HPF

## 2018-01-05 PROCEDURE — 74011000250 HC RX REV CODE- 250: Performed by: EMERGENCY MEDICINE

## 2018-01-05 PROCEDURE — 87086 URINE CULTURE/COLONY COUNT: CPT | Performed by: EMERGENCY MEDICINE

## 2018-01-05 PROCEDURE — 96374 THER/PROPH/DIAG INJ IV PUSH: CPT | Performed by: EMERGENCY MEDICINE

## 2018-01-05 PROCEDURE — 99285 EMERGENCY DEPT VISIT HI MDM: CPT | Performed by: EMERGENCY MEDICINE

## 2018-01-05 PROCEDURE — 72170 X-RAY EXAM OF PELVIS: CPT

## 2018-01-05 PROCEDURE — 93005 ELECTROCARDIOGRAM TRACING: CPT | Performed by: EMERGENCY MEDICINE

## 2018-01-05 PROCEDURE — 70450 CT HEAD/BRAIN W/O DYE: CPT

## 2018-01-05 PROCEDURE — 85025 COMPLETE CBC W/AUTO DIFF WBC: CPT | Performed by: EMERGENCY MEDICINE

## 2018-01-05 PROCEDURE — 83735 ASSAY OF MAGNESIUM: CPT | Performed by: EMERGENCY MEDICINE

## 2018-01-05 PROCEDURE — 65270000029 HC RM PRIVATE

## 2018-01-05 PROCEDURE — 87186 SC STD MICRODIL/AGAR DIL: CPT | Performed by: EMERGENCY MEDICINE

## 2018-01-05 PROCEDURE — 84484 ASSAY OF TROPONIN QUANT: CPT

## 2018-01-05 PROCEDURE — 80053 COMPREHEN METABOLIC PANEL: CPT | Performed by: EMERGENCY MEDICINE

## 2018-01-05 PROCEDURE — 87088 URINE BACTERIA CULTURE: CPT | Performed by: EMERGENCY MEDICINE

## 2018-01-05 PROCEDURE — 71045 X-RAY EXAM CHEST 1 VIEW: CPT

## 2018-01-05 PROCEDURE — 74011250636 HC RX REV CODE- 250/636: Performed by: EMERGENCY MEDICINE

## 2018-01-05 PROCEDURE — 81015 MICROSCOPIC EXAM OF URINE: CPT | Performed by: EMERGENCY MEDICINE

## 2018-01-05 RX ORDER — LEVOTHYROXINE SODIUM 100 UG/1
100 TABLET ORAL
Status: DISCONTINUED | OUTPATIENT
Start: 2018-01-06 | End: 2018-01-11 | Stop reason: HOSPADM

## 2018-01-05 RX ORDER — SODIUM CHLORIDE 0.9 % (FLUSH) 0.9 %
5-10 SYRINGE (ML) INJECTION EVERY 8 HOURS
Status: DISCONTINUED | OUTPATIENT
Start: 2018-01-05 | End: 2018-01-11 | Stop reason: HOSPADM

## 2018-01-05 RX ORDER — METOPROLOL TARTRATE 25 MG/1
25 TABLET, FILM COATED ORAL EVERY 12 HOURS
Status: DISCONTINUED | OUTPATIENT
Start: 2018-01-05 | End: 2018-01-11 | Stop reason: HOSPADM

## 2018-01-05 RX ORDER — DICLOFENAC SODIUM 10 MG/G
4 GEL TOPICAL 4 TIMES DAILY
Status: DISCONTINUED | OUTPATIENT
Start: 2018-01-06 | End: 2018-01-11 | Stop reason: HOSPADM

## 2018-01-05 RX ORDER — DOCUSATE SODIUM 100 MG/1
100 CAPSULE, LIQUID FILLED ORAL 2 TIMES DAILY
Status: DISCONTINUED | OUTPATIENT
Start: 2018-01-06 | End: 2018-01-11 | Stop reason: HOSPADM

## 2018-01-05 RX ORDER — PANTOPRAZOLE SODIUM 40 MG/1
40 TABLET, DELAYED RELEASE ORAL
Status: DISCONTINUED | OUTPATIENT
Start: 2018-01-06 | End: 2018-01-11 | Stop reason: HOSPADM

## 2018-01-05 RX ORDER — ENOXAPARIN SODIUM 100 MG/ML
40 INJECTION SUBCUTANEOUS EVERY 24 HOURS
Status: DISCONTINUED | OUTPATIENT
Start: 2018-01-05 | End: 2018-01-05 | Stop reason: DRUGHIGH

## 2018-01-05 RX ORDER — ENOXAPARIN SODIUM 100 MG/ML
30 INJECTION SUBCUTANEOUS EVERY 24 HOURS
Status: DISCONTINUED | OUTPATIENT
Start: 2018-01-05 | End: 2018-01-11 | Stop reason: HOSPADM

## 2018-01-05 RX ORDER — ASPIRIN 81 MG/1
81 TABLET ORAL DAILY
Status: DISCONTINUED | OUTPATIENT
Start: 2018-01-06 | End: 2018-01-11 | Stop reason: HOSPADM

## 2018-01-05 RX ORDER — SODIUM CHLORIDE 0.9 % (FLUSH) 0.9 %
5-10 SYRINGE (ML) INJECTION AS NEEDED
Status: DISCONTINUED | OUTPATIENT
Start: 2018-01-05 | End: 2018-01-11 | Stop reason: HOSPADM

## 2018-01-05 RX ORDER — ACETAMINOPHEN 325 MG/1
650 TABLET ORAL
Status: DISCONTINUED | OUTPATIENT
Start: 2018-01-05 | End: 2018-01-11 | Stop reason: HOSPADM

## 2018-01-05 RX ORDER — FINASTERIDE 5 MG/1
5 TABLET, FILM COATED ORAL DAILY
Status: DISCONTINUED | OUTPATIENT
Start: 2018-01-06 | End: 2018-01-11 | Stop reason: HOSPADM

## 2018-01-05 RX ORDER — ONDANSETRON 2 MG/ML
4 INJECTION INTRAMUSCULAR; INTRAVENOUS
Status: DISCONTINUED | OUTPATIENT
Start: 2018-01-05 | End: 2018-01-11 | Stop reason: HOSPADM

## 2018-01-05 RX ORDER — BACLOFEN 10 MG/1
10 TABLET ORAL 2 TIMES DAILY
Status: DISCONTINUED | OUTPATIENT
Start: 2018-01-06 | End: 2018-01-11 | Stop reason: HOSPADM

## 2018-01-05 RX ORDER — LANOLIN ALCOHOL/MO/W.PET/CERES
325 CREAM (GRAM) TOPICAL
Status: DISCONTINUED | OUTPATIENT
Start: 2018-01-06 | End: 2018-01-11 | Stop reason: HOSPADM

## 2018-01-05 RX ORDER — HYDRALAZINE HYDROCHLORIDE 50 MG/1
50 TABLET, FILM COATED ORAL 3 TIMES DAILY
Status: DISCONTINUED | OUTPATIENT
Start: 2018-01-05 | End: 2018-01-11 | Stop reason: HOSPADM

## 2018-01-05 RX ADMIN — Medication 5 ML: at 21:42

## 2018-01-05 RX ADMIN — CEFTRIAXONE SODIUM 1 G: 1 INJECTION, POWDER, FOR SOLUTION INTRAMUSCULAR; INTRAVENOUS at 21:36

## 2018-01-05 RX ADMIN — Medication 5 ML: at 21:37

## 2018-01-05 NOTE — IP AVS SNAPSHOT
[address] 
[phone] Patient: Jhonny Jaramillo MRN: [id number] :10/18/1931 About your hospitalization You were admitted on:  2018 You last received care in the:  [facility] 6 MED SURG You were discharged on:  2018 Why you were hospitalized Your primary diagnosis was:  Acute Uti (Urinary Tract Infection) Your diagnoses also included:  Confusion, Paf (Paroxysmal Atrial Fibrillation) (Hcc), Abdominal Aortic Aneurysm (Aaa) Without Rupture (Hcc), Uti (Urinary Tract Infection) Follow-up Information Follow up With Details Comments Contact Info [address] [facility])   [address],[facility] [address] 
[phone] Provider Unknown   Patient not available to ask Discharge Orders None A check telma indicates which time of day the medication should be taken. My Medications START taking these medications Instructions Each Dose to Equal  
 Morning Noon Evening Bedtime  
 cefpodoxime 200 mg tablet Commonly known as:  Julio Dahl Your last dose was: Your next dose is: Take 1 Tab by mouth every twenty-four (24) hours for 4 days. 200 mg CONTINUE taking these medications Instructions Each Dose to Equal  
 Morning Noon Evening Bedtime  
 aspirin delayed-release 81 mg tablet Your last dose was: Your next dose is: Take 81 mg by mouth daily. Indications: myocardial infarction prevention, Myocardial Reinfarction Prevention 81 mg  
    
   
   
   
  
 baclofen 10 mg tablet Commonly known as:  LIORESAL Your last dose was: Your next dose is: Take 10 mg by mouth two (2) times a day. 10 mg  
    
   
   
   
  
 docusate sodium 100 mg capsule Commonly known as:  Romana Moreno Your last dose was: Your next dose is: Take 100 mg by mouth two (2) times a day. 100 mg  
    
   
   
   
  
 ferrous sulfate 325 mg (65 mg iron) tablet Your last dose was: Your next dose is: Take 325 mg by mouth Daily (before breakfast). 325 mg  
    
   
   
   
  
 finasteride 5 mg tablet Commonly known as:  PROSCAR Your last dose was: Your next dose is: Take 5 mg by mouth daily. 5 mg  
    
   
   
   
  
 hydrALAZINE 50 mg tablet Commonly known as:  APRESOLINE Your last dose was: Your next dose is: Take 1 Tab by mouth three (3) times daily. 50 mg  
    
   
   
   
  
 levothyroxine 100 mcg tablet Commonly known as:  SYNTHROID Your last dose was: Your next dose is: Take 100 mcg by mouth Daily (before breakfast). 100 mcg  
    
   
   
   
  
 metoprolol tartrate 25 mg tablet Commonly known as:  LOPRESSOR Your last dose was: Your next dose is: Take 1 Tab by mouth every twelve (12) hours. 25 mg  
    
   
   
   
  
 nicotine 7 mg/24 hr  
Commonly known as:  Senthil Hashimoto Your last dose was: Your next dose is:    
   
   
 1 Patch by TransDERmal route every twenty-four (24) hours. 1 Patch  
    
   
   
   
  
 omeprazole 20 mg capsule Commonly known as:  PRILOSEC Your last dose was: Your next dose is: Take 20 mg by mouth daily. 20 mg  
    
   
   
   
  
 VOLTAREN 1 % Gel Generic drug:  diclofenac Your last dose was: Your next dose is:    
   
   
 Apply  to affected area four (4) times daily. Where to Get Your Medications Information on where to get these meds will be given to you by the nurse or doctor. ! Ask your nurse or doctor about these medications  
  cefpodoxime 200 mg tablet Discharge Instructions None Introducing [facility]! Aldo Stanton introduces [employer]viv[employer] patient portal. Now you can access parts of your medical record, email your doctor's office, and request medication refills online. 1. In your internet browser, go to [email or web address] 2. Click on the First Time User? Click Here link in the Sign In box. You will see the New Member Sign Up page. 3. Enter your Enviv[employer] Access Code exactly as it appears below. You will not need to use this code after youve completed the sign-up process. If you do not sign up before the expiration date, you must request a new code. · En[employer] Access Code: [email or web address] Expires: 4/5/2018  2:48 PM 
 
4. Enter the last four digits of your Social Security Number (xxxx) and Date of Birth (mm/dd/yyyy) as indicated and click Submit. You will be taken to the next sign-up page. 5. Create a Envivio ID. This will be your Envivio login ID and cannot be changed, so think of one that is secure and easy to remember. 6. Create a Enviv[employer] password. You can change your password at any time. 7. Enter your Password Reset Question and Answer. This can be used at a later time if you forget your password. 8. Enter your e-mail address. You will receive e-mail notification when new information is available in [address] 9. Click Sign Up. You can now view and download portions of your medical record. 10. Click the Download Summary menu link to download a portable copy of your medical information. If you have questions, please visit the Frequently Asked Questions section of the [employer] website. Remember, [employer] is NOT to be used for urgent needs. For medical emergencies, dial 911. Now available from your iPhone and Android! Providers Seen During Your Hospitalization Provider Specialty Primary office phone Davida Michel MD Emergency Medicine [phone] Urszula Sky MD Internal Medicine [phone] Immunizations Administered for This Admission Name Date Influenza Vaccine (Quad) PF 1/11/2018 Your Primary Care Physician (PCP) Primary Care Physician Office Phone Office Fax UNKNOWN, PROVIDER ** None ** ** None ** You are allergic to the following No active allergies Recent Documentation Height Weight BMI Smoking Status 1.626 m 58.4 kg 22.11 kg/m2 Current Every Day Smoker Emergency Contacts Name Discharge Info Relation Home Work Mobile Kimberlyn Dupree Patient Belongings The following personal items are in your possession at time of discharge: 
  Dental Appliances: None  Visual Aid: Glasses      Home Medications: None   Jewelry: None  Clothing: At bedside, Shirt Please provide this summary of care documentation to your next provider. Signatures-by signing, you are acknowledging that this After Visit Summary has been reviewed with you and you have received a copy. Patient Signature:  ____________________________________________________________ Date:  ____________________________________________________________  
  
Ellen Garcia Provider Signature:  ____________________________________________________________ Date:  ____________________________________________________________

## 2018-01-05 NOTE — ED NOTES
Pt undressed and cleaned from small BM. Diaper placed and pt repositioned in bed for comfort, remains on monitor, stable, alert.

## 2018-01-05 NOTE — ED TRIAGE NOTES
From [facility]. Near syncopal event after lunch today. Staff called EMS for c/o CP and near syncope. EMS reports 118/72, then standing dropped to 76 palp. Pt doesn't remember episode, states that he has no pain then or currently. Denies any recent fluid loss or change of routine, denies current sickness. Pt currently completely alert, answering questions mostly accurately, family @ BS states he's near baseline (dementia). BP currently elevated.

## 2018-01-05 NOTE — ED PROVIDER NOTES
HPI Comments: 68-year-old gentle who presents with concerns about nearly passing out while at his rehabilitation facility today. Patient says that he may have  Had some chest pain earlier today but he no longer has any. Family says that he does have some dementia and the son doesn't think the patient would necessarily remember whether he had symptoms earlier or not. Patient says that he's had no fevers, vomiting, cough, or chills. Currently the patient says he otherwise feels fine. Elements of this note were created using speech recognition software. As such, errors of speech recognition may be present. Patient is a 80 y.o. male presenting with dizziness and chest pain. The history is provided by the patient. Dizziness   Associated symptoms include chest pain. Pertinent negatives include no shortness of breath, no vomiting, no confusion, no headaches and no nausea. Chest Pain (Angina)    Associated symptoms include dizziness. Pertinent negatives include no abdominal pain, no cough, no diaphoresis, no fever, no headaches, no nausea, no palpitations, no shortness of breath, no vomiting and no weakness. Past Medical History:   Diagnosis Date    CAD (coronary artery disease)        Past Surgical History:   Procedure Laterality Date    CARDIAC SURG PROCEDURE UNLIST           History reviewed. No pertinent family history. Social History     Social History    Marital status:      Spouse name: N/A    Number of children: N/A    Years of education: N/A     Occupational History    Not on file. Social History Main Topics    Smoking status: Current Every Day Smoker    Smokeless tobacco: Not on file    Alcohol use Not on file    Drug use: Not on file    Sexual activity: Not on file     Other Topics Concern    Not on file     Social History Narrative         ALLERGIES: Review of patient's allergies indicates no known allergies.     Review of Systems   Constitutional: Negative for chills, diaphoresis and fever. HENT: Negative for congestion, rhinorrhea and sore throat. Eyes: Negative for redness and visual disturbance. Respiratory: Negative for cough, chest tightness, shortness of breath and wheezing. Cardiovascular: Positive for chest pain. Negative for palpitations. Gastrointestinal: Negative for abdominal pain, blood in stool, diarrhea, nausea and vomiting. Endocrine: Negative for polydipsia and polyuria. Genitourinary: Negative for dysuria and hematuria. Musculoskeletal: Negative for arthralgias, myalgias and neck stiffness. Skin: Negative for rash. Allergic/Immunologic: Negative for environmental allergies and food allergies. Neurological: Positive for dizziness. Negative for weakness and headaches. Hematological: Negative for adenopathy. Does not bruise/bleed easily. Psychiatric/Behavioral: Negative for confusion and sleep disturbance. The patient is not nervous/anxious. Vitals:    01/05/18 1518 01/05/18 1533 01/05/18 1608 01/05/18 1824   BP: 183/77 (!) 176/104 151/60 180/77   Pulse: 72 79 84 69   Resp: 13 15 20 11   Temp:       SpO2: (!) 85% 98%  99%   Weight:       Height:                Physical Exam   Constitutional: He appears well-developed and well-nourished. HENT:   Head: Normocephalic and atraumatic. Eyes: Conjunctivae and EOM are normal. Pupils are equal, round, and reactive to light. Neck: Normal range of motion. Cardiovascular: Normal rate and regular rhythm. Pulmonary/Chest: Effort normal and breath sounds normal. No respiratory distress. He has no wheezes. He has no rales. He exhibits no tenderness. Abdominal: Soft. Bowel sounds are normal. There is no rebound and no guarding. Musculoskeletal: Normal range of motion. He exhibits no edema or tenderness. Lymphadenopathy:     He has no cervical adenopathy. Neurological: He is alert. Patient is pleasantly confused.   He does not have any focal deficits   Skin: Skin is warm and dry. Psychiatric: He has a normal mood and affect. Nursing note and vitals reviewed. MDM  Number of Diagnoses or Management Options  Diagnosis management comments: Patient's initial blood work has been unremarkable. His troponin is negative. Patient's head CT scan shows no acute changes in his chest x-ray is unremarkable. Urine pending    Urine has greater than 100 white cells per high-power field. I will give him some IV antibiotics. 7:44 PM  I discussed the case with the hospitalist he kindly agreed to see the patient.     ED Course       Procedures

## 2018-01-06 LAB
ANION GAP SERPL CALC-SCNC: 9 MMOL/L (ref 7–16)
BUN SERPL-MCNC: 27 MG/DL (ref 8–23)
CALCIUM SERPL-MCNC: 8.5 MG/DL (ref 8.3–10.4)
CHLORIDE SERPL-SCNC: 104 MMOL/L (ref 98–107)
CO2 SERPL-SCNC: 27 MMOL/L (ref 21–32)
CREAT SERPL-MCNC: 1.38 MG/DL (ref 0.8–1.5)
ERYTHROCYTE [DISTWIDTH] IN BLOOD BY AUTOMATED COUNT: 15.5 % (ref 11.9–14.6)
GLUCOSE SERPL-MCNC: 87 MG/DL (ref 65–100)
HCT VFR BLD AUTO: 33.1 % (ref 41.1–50.3)
HGB BLD-MCNC: 11.1 G/DL (ref 13.6–17.2)
MCH RBC QN AUTO: 29.8 PG (ref 26.1–32.9)
MCHC RBC AUTO-ENTMCNC: 33.5 G/DL (ref 31.4–35)
MCV RBC AUTO: 88.7 FL (ref 79.6–97.8)
PHOSPHATE SERPL-MCNC: 2.8 MG/DL (ref 2.3–3.7)
PLATELET # BLD AUTO: 158 K/UL (ref 150–450)
PMV BLD AUTO: 10.6 FL (ref 10.8–14.1)
POTASSIUM SERPL-SCNC: 3.5 MMOL/L (ref 3.5–5.1)
RBC # BLD AUTO: 3.73 M/UL (ref 4.23–5.67)
SODIUM SERPL-SCNC: 140 MMOL/L (ref 136–145)
TSH SERPL DL<=0.005 MIU/L-ACNC: 3.6 UIU/ML (ref 0.36–3.74)
WBC # BLD AUTO: 6.2 K/UL (ref 4.3–11.1)

## 2018-01-06 PROCEDURE — 84443 ASSAY THYROID STIM HORMONE: CPT | Performed by: HOSPITALIST

## 2018-01-06 PROCEDURE — 85027 COMPLETE CBC AUTOMATED: CPT | Performed by: HOSPITALIST

## 2018-01-06 PROCEDURE — 74011250636 HC RX REV CODE- 250/636: Performed by: INTERNAL MEDICINE

## 2018-01-06 PROCEDURE — 74011250637 HC RX REV CODE- 250/637: Performed by: INTERNAL MEDICINE

## 2018-01-06 PROCEDURE — 74011250637 HC RX REV CODE- 250/637: Performed by: HOSPITALIST

## 2018-01-06 PROCEDURE — 65270000029 HC RM PRIVATE

## 2018-01-06 PROCEDURE — 74011250636 HC RX REV CODE- 250/636: Performed by: EMERGENCY MEDICINE

## 2018-01-06 PROCEDURE — 74011000250 HC RX REV CODE- 250: Performed by: EMERGENCY MEDICINE

## 2018-01-06 PROCEDURE — 36415 COLL VENOUS BLD VENIPUNCTURE: CPT | Performed by: HOSPITALIST

## 2018-01-06 PROCEDURE — 84100 ASSAY OF PHOSPHORUS: CPT | Performed by: HOSPITALIST

## 2018-01-06 PROCEDURE — 80048 BASIC METABOLIC PNL TOTAL CA: CPT | Performed by: HOSPITALIST

## 2018-01-06 RX ORDER — POTASSIUM CHLORIDE 20 MEQ/1
40 TABLET, EXTENDED RELEASE ORAL ONCE
Status: COMPLETED | OUTPATIENT
Start: 2018-01-06 | End: 2018-01-06

## 2018-01-06 RX ORDER — SODIUM CHLORIDE 9 MG/ML
125 INJECTION, SOLUTION INTRAVENOUS CONTINUOUS
Status: DISCONTINUED | OUTPATIENT
Start: 2018-01-06 | End: 2018-01-06

## 2018-01-06 RX ADMIN — FERROUS SULFATE TAB 325 MG (65 MG ELEMENTAL FE) 325 MG: 325 (65 FE) TAB at 09:37

## 2018-01-06 RX ADMIN — Medication 10 ML: at 22:34

## 2018-01-06 RX ADMIN — HYDRALAZINE HYDROCHLORIDE 50 MG: 50 TABLET, FILM COATED ORAL at 00:31

## 2018-01-06 RX ADMIN — Medication 10 ML: at 05:28

## 2018-01-06 RX ADMIN — SODIUM CHLORIDE 125 ML/HR: 900 INJECTION, SOLUTION INTRAVENOUS at 03:43

## 2018-01-06 RX ADMIN — Medication 1 AMPULE: at 09:36

## 2018-01-06 RX ADMIN — METOPROLOL TARTRATE 25 MG: 25 TABLET, FILM COATED ORAL at 22:26

## 2018-01-06 RX ADMIN — HYDRALAZINE HYDROCHLORIDE 50 MG: 50 TABLET, FILM COATED ORAL at 09:37

## 2018-01-06 RX ADMIN — Medication 10 ML: at 00:32

## 2018-01-06 RX ADMIN — METOPROLOL TARTRATE 25 MG: 25 TABLET, FILM COATED ORAL at 09:37

## 2018-01-06 RX ADMIN — HYDRALAZINE HYDROCHLORIDE 50 MG: 50 TABLET, FILM COATED ORAL at 17:09

## 2018-01-06 RX ADMIN — BACLOFEN 10 MG: 10 TABLET ORAL at 17:09

## 2018-01-06 RX ADMIN — ENOXAPARIN SODIUM 30 MG: 30 INJECTION SUBCUTANEOUS at 22:29

## 2018-01-06 RX ADMIN — CEFTRIAXONE SODIUM 1 G: 1 INJECTION, POWDER, FOR SOLUTION INTRAMUSCULAR; INTRAVENOUS at 17:09

## 2018-01-06 RX ADMIN — Medication 10 ML: at 17:09

## 2018-01-06 RX ADMIN — HYDRALAZINE HYDROCHLORIDE 50 MG: 50 TABLET, FILM COATED ORAL at 22:26

## 2018-01-06 RX ADMIN — PANTOPRAZOLE SODIUM 40 MG: 40 TABLET, DELAYED RELEASE ORAL at 05:49

## 2018-01-06 RX ADMIN — ENOXAPARIN SODIUM 30 MG: 30 INJECTION SUBCUTANEOUS at 00:31

## 2018-01-06 RX ADMIN — FINASTERIDE 5 MG: 5 TABLET, FILM COATED ORAL at 09:37

## 2018-01-06 RX ADMIN — METOPROLOL TARTRATE 25 MG: 25 TABLET, FILM COATED ORAL at 00:31

## 2018-01-06 RX ADMIN — DOCUSATE SODIUM 100 MG: 100 CAPSULE, LIQUID FILLED ORAL at 09:37

## 2018-01-06 RX ADMIN — LEVOTHYROXINE SODIUM 100 MCG: 100 TABLET ORAL at 05:49

## 2018-01-06 RX ADMIN — POTASSIUM CHLORIDE 40 MEQ: 20 TABLET, EXTENDED RELEASE ORAL at 10:39

## 2018-01-06 RX ADMIN — BACLOFEN 10 MG: 10 TABLET ORAL at 09:37

## 2018-01-06 RX ADMIN — Medication 1 AMPULE: at 22:25

## 2018-01-06 RX ADMIN — ASPIRIN 81 MG: 81 TABLET, COATED ORAL at 09:37

## 2018-01-06 RX ADMIN — Medication 10 ML: at 14:00

## 2018-01-06 RX ADMIN — DOCUSATE SODIUM 100 MG: 100 CAPSULE, LIQUID FILLED ORAL at 17:09

## 2018-01-06 NOTE — PROGRESS NOTES
TRANSFER - IN REPORT:    Verbal report received from [facility] on Alba Shock  being received from ED(unit) for routine progression of care      Report consisted of patients Situation, Background, Assessment and   Recommendations(SBAR). Information from the following report(s) SBAR was reviewed with the receiving nurse. Opportunity for questions and clarification was provided. Dual skin assessment completed with Josué Suarez RN upon patients arrival to unit and care assumed.

## 2018-01-06 NOTE — ED NOTES
TRANSFER - OUT REPORT:    Verbal report given to Christian on Valdez Paget  being transferred to [address] [place] [address] for routine progression of care       Report consisted of patients Situation, Background, Assessment and   Recommendations(SBAR). Information from the following report(s) SBAR, Kardex, ED Summary, Intake/Output, MAR, Recent Results, Med Rec Status and Cardiac Rhythm NSR was reviewed with the receiving nurse. Lines:   Peripheral IV 01/05/18 Right Antecubital (Active)   Site Assessment Clean, dry, & intact 1/5/2018  3:13 PM   Phlebitis Assessment 0 1/5/2018  3:13 PM   Infiltration Assessment 0 1/5/2018  3:13 PM   Dressing Status Clean, dry, & intact 1/5/2018  3:13 PM        Opportunity for questions and clarification was provided.       Patient transported with:   [employer]

## 2018-01-06 NOTE — PROGRESS NOTES
Progress Note    Patient: Luis Finney MRN: [id number]  SSN: [id number]    YOB: 1931  Age: 80 y.o. Sex: male      Admit Date: 1/5/2018    LOS: 1 day     Subjective:   Patient examined at bedside. He was admitted due to near-syncope and uti. He had acute complains. He ate his meals. ROS: all pertinent findings described in my note. Objective:     Vitals:    01/05/18 2143 01/05/18 2356 01/06/18 0334 01/06/18 0704   BP:  173/74 143/56 157/73   Pulse:  79 74 69   Resp:  17 18 18   Temp:  98.5 °F (36.9 °C) 97.8 °F (36.6 °C) 97.6 °F (36.4 °C)   SpO2: 96% 92% 90% 95%   Weight:       Height:            Intake and Output:  Current Shift:    Last three shifts:      Physical Exam:   GENERAL: alert, cooperative, no distress, appears stated age  EYE: negative  LYMPHATIC: Cervical, supraclavicular, and axillary nodes normal.   THROAT & NECK: normal and no erythema or exudates noted. LUNG: clear to auscultation bilaterally  HEART: regular rate and rhythm, S1, S2 normal, no murmur, click, rub or gallop  ABDOMEN: soft, non-tender. Bowel sounds normal. No masses,  no organomegaly  EXTREMITIES:  extremities normal, atraumatic, no cyanosis or edema  SKIN: Normal.  NEUROLOGIC: negative  PSYCHIATRIC: non focal    Lab/Data Review: All lab results for the last 24 hours reviewed.        Assessment:     Principal Problem:    Acute UTI (urinary tract infection) (1/5/2018)    Active Problems:    Abdominal aortic aneurysm (AAA) without rupture ([facility] [address] (8/18/2017)      PAF (paroxysmal atrial fibrillation) ([facility] [address] (8/22/2017)      Confusion (1/5/2018)        Plan:   -Non-complicated UTI: continue rocephin- fu urine cultures  -Episode of hypotension, near syncope, likely due to UTI, medication induced : resolved- DC IVF- observe   -Dementia: observe- if agitation give haldol prn  -History of P A fib: on NSR, monitor, on lopressor, asa   -Hypokalemia: replace prn    · lovenox for DVT prophylaxis    PT/OT     Signed By: Rowan Alejandro MD     January 6, 2018

## 2018-01-06 NOTE — H&P
Hospitalist H&P/Consult Note     Admit Date:  2018  3:12 PM   Name:  [place]   Age:  80 y.o.  :  10/18/1931   MRN:  [id number]   PCP:  PROVIDER UNKNOWN  Treatment Team: Attending Provider: Rody Novak MD; Primary Nurse: Amie Mcmahan    HPI:   81 y/o male with hx dementia, CAD, AAA repair in  present from [facility] rehab for nrear-syncope, confusion and hypotension. He is a poor historian and does not recall event. Denies any chest pain, shortness of breath, nausea/vomiting or abdominal pain. No reported fever or chills. A non-contrast head CT is negative. UA is positive for greater than 100 WBC, 10-20 RBC and 4+ bacteria. IV antibiotics were started and Hospitalist service consulted for admission. He is also dehydrated. Bun 32/Cr 1.42.     10 systems reviewed and negative except as noted in HPI. Past Medical History:   Diagnosis Date    CAD (coronary artery disease)       Past Surgical History:   Procedure Laterality Date    CARDIAC SURG PROCEDURE UNLIST        Prior to Admission Medications   Prescriptions Last Dose Informant Patient Reported? Taking?   aspirin delayed-release 81 mg tablet   Yes No   Sig: Take 81 mg by mouth daily. Indications: myocardial infarction prevention, Myocardial Reinfarction Prevention   baclofen (LIORESAL) 10 mg tablet   Yes No   Sig: Take 10 mg by mouth two (2) times a day. diclofenac (VOLTAREN) 1 % gel   Yes No   Sig: Apply  to affected area four (4) times daily. docusate sodium (COLACE) 100 mg capsule   Yes No   Sig: Take 100 mg by mouth two (2) times a day. ferrous sulfate 325 mg (65 mg iron) tablet   Yes No   Sig: Take 325 mg by mouth Daily (before breakfast). finasteride (PROSCAR) 5 mg tablet   Yes No   Sig: Take 5 mg by mouth daily. hydrALAZINE (APRESOLINE) 50 mg tablet   No No   Sig: Take 1 Tab by mouth three (3) times daily.    levothyroxine (SYNTHROID) 100 mcg tablet   Yes No   Sig: Take 100 mcg by mouth Daily (before breakfast). metoprolol tartrate (LOPRESSOR) 25 mg tablet   No No   Sig: Take 1 Tab by mouth every twelve (12) hours. nicotine (NICODERM CQ) 7 mg/24 hr   Yes No   Si Patch by TransDERmal route every twenty-four (24) hours. omeprazole (PRILOSEC) 20 mg capsule   Yes No   Sig: Take 20 mg by mouth daily. Facility-Administered Medications: None     No Known Allergies   Social History   Substance Use Topics    Smoking status: Current Every Day Smoker    Smokeless tobacco: Not on file    Alcohol use Not on file      History reviewed. No pertinent family history. Immunization History   Administered Date(s) Administered    TB Skin Test (PPD) Intradermal 2017       Objective:   Patient Vitals for the past 24 hrs:   Temp Pulse Resp BP SpO2   18 - - - - 96 %   18 97.5 °F (36.4 °C) 72 14 170/70 96 %   18 - 80 8 163/70 -   18 -  10 163/70 -   18 -  12 181/75 -   18 1933 - 74 14 192/82 97 %   18 1903 - 75 13 (!) 202/106 98 %   18 1833 - 68 16 176/76 96 %   18 1824 - 69 11 180/77 99 %   18 1608 - 84 20 151/60 -   18 1533 - 79 15 (!) 176/104 98 %   18 1518 - 72 13 183/77 (!) 85 %   18 1517 - 81 - 190/78 96 %   18 1510 98.3 °F (36.8 °C) 71 15 183/77 98 %     Oxygen Therapy  O2 Sat (%): 96 % (18)  Pulse via Oximetry: 69 beats per minute (18)  O2 Device: Room air (18)  No intake or output data in the 24 hours ending 18    Physical Exam:  General:    Pleasantly confused. Alert. hypertensive   Eyes:   Normal sclera. Extraocular movements intact. ENT:  Normocephalic, atraumatic. Moist mucous membranes  CV:   RRR. No murmur, rub, or gallop. Lungs:  CTAB. No wheezing, rhonchi, or rales. Abdomen: Soft, nontender, nondistended. Bowel sounds normal.   Extremities: Warm and dry. No cyanosis or edema. Neurologic: CN II-XII grossly intact. Sensation intact. Skin:     No rashes or jaundice. No wounds. Psych:  Normal mood and affect. I reviewed the labs, imaging, EKGs, telemetry, and other studies done this admission. Data Review:   Recent Results (from the past 24 hour(s))   EKG, 12 LEAD, INITIAL    Collection Time: 01/05/18  3:27 PM   Result Value Ref Range    Ventricular Rate 73 BPM    Atrial Rate 125 BPM    P-R Interval 176 ms    QRS Duration 72 ms    Q-T Interval 354 ms    QTC Calculation (Bezet) 389 ms    Calculated P Axis 47 degrees    Calculated R Axis 28 degrees    Calculated T Axis 1 degrees    Diagnosis       Normal sinus rhythm  Premature atrial complexes  Blocked Premature atrial complexes  Confirmed by Rosie Penaloza MD (), BRIDGETT SOLOMON ([id number]) on 1/5/2018 5:06:13 PM     CBC WITH AUTOMATED DIFF    Collection Time: 01/05/18  3:41 PM   Result Value Ref Range    WBC 5.9 4.3 - 11.1 K/uL    RBC 3.59 (L) 4.23 - 5.67 M/uL    HGB 10.6 (L) 13.6 - 17.2 g/dL    HCT 31.8 (L) 41.1 - 50.3 %    MCV 88.6 79.6 - 97.8 FL    MCH 29.5 26.1 - 32.9 PG    MCHC 33.3 31.4 - 35.0 g/dL    RDW 15.8 (H) 11.9 - 14.6 %    PLATELET 266 094 - 055 K/uL    MPV 10.8 10.8 - 14.1 FL    DF AUTOMATED      NEUTROPHILS 52 43 - 78 %    LYMPHOCYTES 34 13 - 44 %    MONOCYTES 10 4.0 - 12.0 %    EOSINOPHILS 3 0.5 - 7.8 %    BASOPHILS 1 0.0 - 2.0 %    IMMATURE GRANULOCYTES 0 0.0 - 5.0 %    ABS. NEUTROPHILS 3.0 1.7 - 8.2 K/UL    ABS. LYMPHOCYTES 2.0 0.5 - 4.6 K/UL    ABS. MONOCYTES 0.6 0.1 - 1.3 K/UL    ABS. EOSINOPHILS 0.2 0.0 - 0.8 K/UL    ABS. BASOPHILS 0.0 0.0 - 0.2 K/UL    ABS. IMM.  GRANS. 0.0 0.0 - 0.5 K/UL   METABOLIC PANEL, COMPREHENSIVE    Collection Time: 01/05/18  3:41 PM   Result Value Ref Range    Sodium 142 136 - 145 mmol/L    Potassium 3.7 3.5 - 5.1 mmol/L    Chloride 106 98 - 107 mmol/L    CO2 29 21 - 32 mmol/L    Anion gap 7 7 - 16 mmol/L    Glucose 85 65 - 100 mg/dL    BUN 32 (H) 8 - 23 MG/DL    Creatinine 1.42 0.8 - 1.5 MG/DL    GFR est AA >60 >60 ml/min/1.73m2 GFR est non-AA 50 (L) >60 ml/min/1.73m2    Calcium 8.2 (L) 8.3 - 10.4 MG/DL    Bilirubin, total 0.3 0.2 - 1.1 MG/DL    ALT (SGPT) 8 (L) 12 - 65 U/L    AST (SGOT) 7 (L) 15 - 37 U/L    Alk. phosphatase 70 50 - 136 U/L    Protein, total 7.2 6.3 - 8.2 g/dL    Albumin 2.8 (L) 3.2 - 4.6 g/dL    Globulin 4.4 (H) 2.3 - 3.5 g/dL    A-G Ratio 0.6 (L) 1.2 - 3.5     MAGNESIUM    Collection Time: 01/05/18  3:41 PM   Result Value Ref Range    Magnesium 1.8 1.8 - 2.4 mg/dL   POC TROPONIN-I    Collection Time: 01/05/18  3:48 PM   Result Value Ref Range    Troponin-I (POC) 0.01 (L) 0.02 - 0.05 ng/ml   URINE MICROSCOPIC    Collection Time: 01/05/18  6:45 PM   Result Value Ref Range    WBC >100 0 /hpf    RBC 10-20 0 /hpf    Epithelial cells 0-3 0 /hpf    Bacteria 4+ (H) 0 /hpf    Casts 0 0 /lpf    Crystals, urine 0 0 /LPF    Mucus 0 0 /lpf    Other observations RESULTS VERIFIED MANUALLY         Imaging Studies:  CXR Results  (Last 48 hours)               01/05/18 1617  XR CHEST PORT Final result    Impression:  IMPRESSION:  STABLE CARDIOMEGALY STATUS POST CABG WITH NO ACUTE CARDIOPULMONARY   DISEASE IDENTIFIED. Narrative:  PORTABLE CHEST, January 5, 2018 at 1557 hours       CLINICAL HISTORY:  CHEST pain. COMPARISON:  Date 1017. FINDINGS:  AP erect image demonstrates no confluent infiltrate or significant   pleural fluid. The heart images mildly enlarged status post CABG without   evidence of congestive heart failure or pneumothorax. The bony thorax appears   intact on this view. 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 There are   overlying radiopaque support devices. CT Results  (Last 48 hours)               01/05/18 1630  CT HEAD WO CONT Final result    Impression:  IMPRESSION:            1.  RIGHT MAXILLARY AND ETHMOID SINUSITIS. 2. EXTENSIVE SMALL VESSEL ISCHEMIC DISEASE WITH NO ACUTE INTRACRANIAL   ABNORMALITY OR CALVARIAL FRACTURE IDENTIFIED AT NONCONTRAST CT. Narrative:  NONCONTRAST HEAD CT       CLINICAL HISTORY:  HEAD injury from syncopal fall today. COMPARISON:  June 12, 2017. REPORT:   Standard non contrast head CT demonstrates no definite intracranial   mass effect, hemorrhage, or evidence of acute geographic infarction. Extensive   white matter hypodensities are most consistent with small vessel ischemic   disease. The ventricles are normal in size and configuration, accounting for   the patient's age. Orbits and  are clear where imaged. There is soft tissue   opacification of multiple ethmoid air cells bilaterally with moderate mucous   membrane thickening in the right maxillary sinus. Bone windows demonstrate no   definite fracture or destruction. Assessment and Plan:     Hospital Problems as of 1/5/2018  Never Reviewed          Codes Class Noted - Resolved POA    * (Principal)Acute UTI (urinary tract infection) ICD-10-CM: N39.0  ICD-9-CM: 599.0  1/5/2018 - Present Yes        Confusion ICD-10-CM: R41.0  ICD-9-CM: 298.9  1/5/2018 - Present Yes        PAF (paroxysmal atrial fibrillation) ([facility]) ICD-10-CM: I48.0  ICD-9-CM: 427.31  8/22/2017 - Present Yes        Abdominal aortic aneurysm (AAA) without rupture ([facility] [address] ICD-10-CM: I71.4  ICD-9-CM: 441.4  8/18/2017 - Present Yes              PLAN:  · Admit inpatient to medical bed  · Bedrest for now.  Monitor neurologic status  · Has dementia, observe for sundowning  · IV fluids, IV antibiotics  · Follow-up urine culture  · Currently in sinus rhythm  · Check orthostatics in am. PT evaluation  · lovenox for DVT prophylaxis      Estimated LOS: 2 midnights     Signed:  Alana Cade MD

## 2018-01-06 NOTE — PROGRESS NOTES
Hourly rounds performed on patient. Pt rested comfortably throughout the night with no complaints of pain. Pt remained alert and oriented throughout the night with no evidence of sundowning. No signs of respiratory distress noted. VSS. Pt needs met at this time.

## 2018-01-06 NOTE — PROGRESS NOTES
Attempted to visit with patient   Reviewed notes for spiritual concerns. Left card.       Nathalie Hughes, staff Claudia yang [address] [place] [address]  C: [phone]  /  [email or web address]

## 2018-01-07 LAB
ANION GAP SERPL CALC-SCNC: 9 MMOL/L (ref 7–16)
BASOPHILS # BLD: 0 K/UL (ref 0–0.2)
BASOPHILS NFR BLD: 1 % (ref 0–2)
BUN SERPL-MCNC: 21 MG/DL (ref 8–23)
CALCIUM SERPL-MCNC: 8.3 MG/DL (ref 8.3–10.4)
CHLORIDE SERPL-SCNC: 107 MMOL/L (ref 98–107)
CO2 SERPL-SCNC: 25 MMOL/L (ref 21–32)
CREAT SERPL-MCNC: 1.36 MG/DL (ref 0.8–1.5)
DIFFERENTIAL METHOD BLD: ABNORMAL
EOSINOPHIL # BLD: 0.2 K/UL (ref 0–0.8)
EOSINOPHIL NFR BLD: 3 % (ref 0.5–7.8)
ERYTHROCYTE [DISTWIDTH] IN BLOOD BY AUTOMATED COUNT: 15.9 % (ref 11.9–14.6)
GLUCOSE SERPL-MCNC: 82 MG/DL (ref 65–100)
HCT VFR BLD AUTO: 32.1 % (ref 41.1–50.3)
HGB BLD-MCNC: 10.5 G/DL (ref 13.6–17.2)
IMM GRANULOCYTES # BLD: 0 K/UL (ref 0–0.5)
IMM GRANULOCYTES NFR BLD AUTO: 0 % (ref 0–5)
LYMPHOCYTES # BLD: 1.7 K/UL (ref 0.5–4.6)
LYMPHOCYTES NFR BLD: 32 % (ref 13–44)
MCH RBC QN AUTO: 28.8 PG (ref 26.1–32.9)
MCHC RBC AUTO-ENTMCNC: 32.7 G/DL (ref 31.4–35)
MCV RBC AUTO: 87.9 FL (ref 79.6–97.8)
MONOCYTES # BLD: 0.5 K/UL (ref 0.1–1.3)
MONOCYTES NFR BLD: 10 % (ref 4–12)
NEUTS SEG # BLD: 2.9 K/UL (ref 1.7–8.2)
NEUTS SEG NFR BLD: 54 % (ref 43–78)
PLATELET # BLD AUTO: 180 K/UL (ref 150–450)
PMV BLD AUTO: 11 FL (ref 10.8–14.1)
POTASSIUM SERPL-SCNC: 3.7 MMOL/L (ref 3.5–5.1)
RBC # BLD AUTO: 3.65 M/UL (ref 4.23–5.67)
SODIUM SERPL-SCNC: 141 MMOL/L (ref 136–145)
WBC # BLD AUTO: 5.4 K/UL (ref 4.3–11.1)

## 2018-01-07 PROCEDURE — 65270000029 HC RM PRIVATE

## 2018-01-07 PROCEDURE — 97530 THERAPEUTIC ACTIVITIES: CPT

## 2018-01-07 PROCEDURE — 85025 COMPLETE CBC W/AUTO DIFF WBC: CPT | Performed by: INTERNAL MEDICINE

## 2018-01-07 PROCEDURE — 97162 PT EVAL MOD COMPLEX 30 MIN: CPT

## 2018-01-07 PROCEDURE — 80048 BASIC METABOLIC PNL TOTAL CA: CPT | Performed by: INTERNAL MEDICINE

## 2018-01-07 PROCEDURE — 74011250636 HC RX REV CODE- 250/636: Performed by: EMERGENCY MEDICINE

## 2018-01-07 PROCEDURE — 74011250637 HC RX REV CODE- 250/637: Performed by: HOSPITALIST

## 2018-01-07 PROCEDURE — 36415 COLL VENOUS BLD VENIPUNCTURE: CPT | Performed by: INTERNAL MEDICINE

## 2018-01-07 PROCEDURE — 74011000250 HC RX REV CODE- 250: Performed by: EMERGENCY MEDICINE

## 2018-01-07 PROCEDURE — 74011250637 HC RX REV CODE- 250/637: Performed by: INTERNAL MEDICINE

## 2018-01-07 RX ADMIN — Medication 10 ML: at 21:01

## 2018-01-07 RX ADMIN — HYDRALAZINE HYDROCHLORIDE 50 MG: 50 TABLET, FILM COATED ORAL at 08:57

## 2018-01-07 RX ADMIN — ENOXAPARIN SODIUM 30 MG: 30 INJECTION SUBCUTANEOUS at 22:56

## 2018-01-07 RX ADMIN — Medication 10 ML: at 14:00

## 2018-01-07 RX ADMIN — FERROUS SULFATE TAB 325 MG (65 MG ELEMENTAL FE) 325 MG: 325 (65 FE) TAB at 05:33

## 2018-01-07 RX ADMIN — ASPIRIN 81 MG: 81 TABLET, COATED ORAL at 08:57

## 2018-01-07 RX ADMIN — LEVOTHYROXINE SODIUM 100 MCG: 100 TABLET ORAL at 05:33

## 2018-01-07 RX ADMIN — PANTOPRAZOLE SODIUM 40 MG: 40 TABLET, DELAYED RELEASE ORAL at 05:33

## 2018-01-07 RX ADMIN — BACLOFEN 10 MG: 10 TABLET ORAL at 17:26

## 2018-01-07 RX ADMIN — DOCUSATE SODIUM 100 MG: 100 CAPSULE, LIQUID FILLED ORAL at 17:26

## 2018-01-07 RX ADMIN — FINASTERIDE 5 MG: 5 TABLET, FILM COATED ORAL at 08:56

## 2018-01-07 RX ADMIN — Medication 1 AMPULE: at 08:57

## 2018-01-07 RX ADMIN — CEFTRIAXONE SODIUM 1 G: 1 INJECTION, POWDER, FOR SOLUTION INTRAMUSCULAR; INTRAVENOUS at 17:26

## 2018-01-07 RX ADMIN — METOPROLOL TARTRATE 25 MG: 25 TABLET, FILM COATED ORAL at 08:57

## 2018-01-07 RX ADMIN — Medication 10 ML: at 05:37

## 2018-01-07 RX ADMIN — DOCUSATE SODIUM 100 MG: 100 CAPSULE, LIQUID FILLED ORAL at 08:56

## 2018-01-07 RX ADMIN — HYDRALAZINE HYDROCHLORIDE 50 MG: 50 TABLET, FILM COATED ORAL at 17:26

## 2018-01-07 RX ADMIN — Medication 5 ML: at 21:00

## 2018-01-07 RX ADMIN — METOPROLOL TARTRATE 25 MG: 25 TABLET, FILM COATED ORAL at 20:58

## 2018-01-07 RX ADMIN — HYDRALAZINE HYDROCHLORIDE 50 MG: 50 TABLET, FILM COATED ORAL at 21:00

## 2018-01-07 RX ADMIN — Medication 1 AMPULE: at 20:58

## 2018-01-07 RX ADMIN — BACLOFEN 10 MG: 10 TABLET ORAL at 08:57

## 2018-01-07 NOTE — PROGRESS NOTES
Progress Note    Patient: Vonda Marcos MRN: [id number]  SSN: [id number]    YOB: 1931  Age: 80 y.o. Sex: male      Admit Date: 1/5/2018    LOS: 2 days     Subjective:   79 y/o male with hx dementia, CAD, AAA repair in August, PAF presented from [facility] rehab for near-syncope, confusion and hypotension. A non-contrast head CT was negative. UA is positive > 100 WBC. Bun 32/Cr 1.42. Patient was started on rocephin and iv fluids. So far his urine culture is partial for gram negative rods. His clinical status improved. PT/OT evaluation in light of his syncopal event and unsteady gait. Patient examined at bedside. Denies nausea, vomiting, diarrhea, abdominal pain. ROS: all pertinent findings described in my note. Objective:     Vitals:    01/06/18 2257 01/07/18 0440 01/07/18 0508 01/07/18 0703   BP: 125/67 115/55  133/65   Pulse: 68 62  79   Resp: 20 20  18   Temp: 98.4 °F (36.9 °C) 97.8 °F (36.6 °C)  98.4 °F (36.9 °C)   SpO2: 95% 94%  97%   Weight:   58.4 kg (128 lb 12.8 oz)    Height:            Intake and Output:  Current Shift:    Last three shifts:      Physical Exam:   GENERAL: alert, cooperative, no distress, appears stated age  EYE: negative  LYMPHATIC: Cervical, supraclavicular, and axillary nodes normal.   THROAT & NECK: normal and no erythema or exudates noted. LUNG: clear to auscultation bilaterally  HEART: regular rate and rhythm, S1, S2 normal, no murmur, click, rub or gallop  ABDOMEN: soft, non-tender. Bowel sounds normal. No masses,  no organomegaly  EXTREMITIES:  extremities normal, atraumatic, no cyanosis or edema  SKIN: Normal.  NEUROLOGIC: negative  PSYCHIATRIC: non focal    Lab/Data Review: All lab results for the last 24 hours reviewed.        Assessment:     Principal Problem:    Acute UTI (urinary tract infection) (1/5/2018)    Active Problems:    Abdominal aortic aneurysm (AAA) without rupture ([address] (8/18/2017)      PAF (paroxysmal atrial fibrillation) ([facility] [address] (8/22/2017)      Confusion (1/5/2018)        Plan:   -Non-complicated UTI, due to gram negative rods : continue rocephin- fu urine cultures  -Episode of hypotension, near syncope, likely due to UTI, medication induced : resolved  -Dementia: observe- if agitation give haldol prn  -History of P A fib: on NSR, monitor, on lopressor, asa   -Hypokalemia: resolved     · lovenox for DVT prophylaxis    PT/OT - has unsteady gait-  Care manager involvement     Signed By: Marisol Steele MD     January 7, 2018

## 2018-01-07 NOTE — PROGRESS NOTES
Patient rounded on hourly throughout the day, all needs met. No complaints of pain at this time. Will continue to monitor the patient.

## 2018-01-07 NOTE — PROGRESS NOTES
Problem: Mobility Impaired (Adult and Pediatric)  Goal: *Acute Goals and Plan of Care (Insert Text)  LTG:  (1.)Mr. Scheyder will move from supine to sit and sit to supine , scoot up and down and roll side to side in bed with INDEPENDENCE within 4-7 day(s). (2.)Mr. Scheyder will transfer from bed to chair and chair to bed with SUPERVISION using the least restrictive device within 4-7 day(s). (3.)Mr. Scheyder will ambulate with SUPERVISION for 50 feet with the least restrictive device within 4-7 day(s). ________________________________________________________________________________________________     PHYSICAL THERAPY: Initial Assessment, Treatment Day: Day of Assessment, AM 1/7/2018  INPATIENT: Hospital Day: 3  Payor: Rosy Lai / Plan: [id number] PPO/PFFS / Product Type: [employer] Medicare /      NAME/AGE/GENDER: Erickson Villar is a 80 y.o. male   PRIMARY DIAGNOSIS: Acute UTI (urinary tract infection)  Confusion Acute UTI (urinary tract infection) Acute UTI (urinary tract infection)        ICD-10: Treatment Diagnosis:   · Generalized Muscle Weakness (M62.81)  · Difficulty in walking, Not elsewhere classified (R26.2)  · Other abnormalities of gait and mobility (R26.89)   Precaution/Allergies:  Review of patient's allergies indicates no known allergies. ASSESSMENT:     Mr. Telma Calhoun presents supine in bed upon physical therapist entry to room this session. He was able to sit up with James and then modA to stand up with B UE support at RW x 2 bouts. He was able to stand for 30 seconds - 1 minute trials each bout, but required modA to correct posterior lean to maintain upright position while standing. He was able to return to supine position in bed with James. Unable to determine pt's previous level of functional mobility and living situation secondary to significant pt confusion (pt has dementia).  Pt may continue to benefit from skilled PT to address the below listed deficits to improve his safety and independence with transfers and ambulation prior to discharge. This section established at most recent assessment   PROBLEM LIST (Impairments causing functional limitations):  1. Decreased Strength  2. Decreased ADL/Functional Activities  3. Decreased Transfer Abilities  4. Decreased Ambulation Ability/Technique  5. Decreased Balance  6. Decreased Activity Tolerance  7. Increased Fatigue  8. Decreased Flexibility/Joint Mobility   INTERVENTIONS PLANNED: (Benefits and precautions of physical therapy have been discussed with the patient.)  1. Balance Exercise  2. Bed Mobility  3. Family Education  4. Gait Training  5. Home Exercise Program (HEP)  6. Neuromuscular Re-education/Strengthening  7. Range of Motion (ROM)  8. Therapeutic Activites  9. Therapeutic Exercise/Strengthening  10. Transfer Training  11. Group Therapy     TREATMENT PLAN: Frequency/Duration: 3 times a week for duration of hospital stay  Rehabilitation Potential For Stated Goals: Jia Nichole REHABILITATION/EQUIPMENT: (at time of discharge pending progress): Due to the probability of continued deficits (see above) this patient will likely need continued skilled physical therapy after discharge. Equipment:    None at this time              HISTORY:   History of Present Injury/Illness (Reason for Referral):  Per MD note: \"79 y/o male with hx dementia, CAD, AAA repair in August, PAF present from [facility] rehab for nrear-syncope, confusion and hypotension. He is a poor historian and does not recall event. Denies any chest pain, shortness of breath, nausea/vomiting or abdominal pain. No reported fever or chills. A non-contrast head CT is negative. UA is positive for greater than 100 WBC, 10-20 RBC and 4+ bacteria. IV antibiotics were started and Hospitalist service consulted for admission. He is also dehydrated.  Bun 32/Cr 1.42. \"  Past Medical History/Comorbidities:   Mr. Ching Nieto  has a past medical history of CAD (coronary artery disease). Mr. Daryle Crea  has a past surgical history that includes pr cardiac surg procedure unlist.  Social History/Living Environment:   Home Environment:  (pt unable to verbalize due to confusion/dementia)  Prior Level of Function/Work/Activity:  Unable to determine   Number of Personal Factors/Comorbidities that affect the Plan of Care: 1-2: MODERATE COMPLEXITY   EXAMINATION:   Most Recent Physical Functioning:   Gross Assessment:  AROM: Generally decreased, functional  PROM: Generally decreased, functional  Strength: Generally decreased, functional               Posture:  Posture (WDL): Exceptions to WDL  Posture Assessment: Kyphosis, Trunk flexion, Increased, Rounded shoulders, Forward head  Balance:  Sitting: Impaired  Sitting - Static: Fair (occasional)  Sitting - Dynamic: Poor (constant support)  Standing: Impaired;Pull to stand  Standing - Static: Constant support;Poor  Standing - Dynamic : Poor Bed Mobility:  Supine to Sit: Minimum assistance  Sit to Supine: Minimum assistance  Scooting: Contact guard assistance;Supervision  Wheelchair Mobility:     Transfers:  Sit to Stand: Moderate assistance;Assist x1  Stand to Sit: Minimum assistance; Moderate assistance  Gait:            Body Structures Involved:  1. Bones  2. Joints  3. Muscles  4. Ligaments Body Functions Affected:  1. Mental  2. Sensory/Pain  3. Neuromusculoskeletal  4. Movement Related Activities and Participation Affected:  1. Learning and Applying Knowledge  2. General Tasks and Demands  3. Mobility  4. Self Care  5. Domestic Life  6. Interpersonal Interactions and Relationships  7.  Community, Social and [place]   Number of elements that affect the Plan of Care: 4+: HIGH COMPLEXITY   CLINICAL PRESENTATION:   Presentation: Evolving clinical presentation with changing clinical characteristics: MODERATE COMPLEXITY   CLINICAL DECISION MAKIN [facility] [address] [id number] AM-PAC [id number] [facility] Mobility Inpatient Short Form  How much difficulty does the patient currently have. .. Unable A Lot A Little None   1. Turning over in bed (including adjusting bedclothes, sheets and blankets)? [] 1   [] 2   [x] 3   [] 4   2. Sitting down on and standing up from a chair with arms ( e.g., wheelchair, bedside commode, etc.)   [] 1   [x] 2   [] 3   [] 4   3. Moving from lying on back to sitting on the side of the bed? [] 1   [] 2   [x] 3   [] 4   How much help from another person does the patient currently need. .. Total A Lot A Little None   4. Moving to and from a bed to a chair (including a wheelchair)? [x] 1   [] 2   [] 3   [] 4   5. Need to walk in hospital room? [x] 1   [] 2   [] 3   [] 4   6. Climbing 3-5 steps with a railing? [x] 1   [] 2   [] 3   [] 4   © 2007, Trustees of [address] [place] [address], under license to [employer]. All rights reserved      Score:  Initial: 11 Most Recent: X (Date: -- )    Interpretation of Tool:  Represents activities that are increasingly more difficult (i.e. Bed mobility, Transfers, Gait). Score 24 23 22-20 19-15 14-10 9-7 6     Modifier CH CI CJ CK CL CM CN      ? Mobility - Walking and Moving Around:     - CURRENT STATUS: CL - 60%-79% impaired, limited or restricted    - GOAL STATUS: CK - 40%-59% impaired, limited or restricted    - D/C STATUS:  ---------------To be determined---------------  Payor: [employer] MEDICARE / Plan: [facility] [employer] MEDICARE CHOICE PPO/PFFS / Product Type: Managed Care Medicare /      Medical Necessity:     · Patient is expected to demonstrate progress in strength, range of motion, balance and functional technique to improve safety during ambulation and transfers. · Patient demonstrates fair rehab potential due to higher previous functional level. Reason for Services/Other Comments:  · Patient continues to require modification of therapeutic interventions to increase complexity of exercises.    Use of outcome tool(s) and clinical judgement create a POC that gives a: Difficult prediction of patient's progress: HIGH COMPLEXITY            TREATMENT:   (In addition to Assessment/Re-Assessment sessions the following treatments were rendered)   Pre-treatment Symptoms/Complaints:  Pt not verbalizing any complaints  Pain: Initial:   No pain verbalized  Post Session:  No pain verbalized     Therapeutic Activity: (    10): Therapeutic activities including Bed transfers, Chair transfers and static standing to improve mobility, strength, balance and dynamic movement of leg - bilateral to improve functional mobility. Required moderate verbal and tactile cues   to promote static and dynamic balance in standing. Braces/Orthotics/Lines/Etc:   · O2 Device: Room air  Treatment/Session Assessment:    · Response to Treatment:  Pt reporting no pain at end of session  · Interdisciplinary Collaboration:   o Physical Therapist  o Registered Nurse  · After treatment position/precautions:   o Supine in bed  o Bed/Chair-wheels locked  o Bed in low position  o Call light within reach  o RN notified  o Side rails x 3   · Compliance with Program/Exercises: Will assess as treatment progresses. · Recommendations/Intent for next treatment session: \"Next visit will focus on advancements to more challenging activities and reduction in assistance provided\".   Total Treatment Duration:  PT Patient Time In/Time Out  Time In: 1043  Time Out: 1601 [facility]

## 2018-01-08 LAB
ANION GAP SERPL CALC-SCNC: 11 MMOL/L (ref 7–16)
BACTERIA SPEC CULT: ABNORMAL
BASOPHILS # BLD: 0 K/UL (ref 0–0.2)
BASOPHILS NFR BLD: 1 % (ref 0–2)
BUN SERPL-MCNC: 19 MG/DL (ref 8–23)
CALCIUM SERPL-MCNC: 8.3 MG/DL (ref 8.3–10.4)
CHLORIDE SERPL-SCNC: 108 MMOL/L (ref 98–107)
CO2 SERPL-SCNC: 24 MMOL/L (ref 21–32)
CREAT SERPL-MCNC: 1.5 MG/DL (ref 0.8–1.5)
DIFFERENTIAL METHOD BLD: ABNORMAL
EOSINOPHIL # BLD: 0.2 K/UL (ref 0–0.8)
EOSINOPHIL NFR BLD: 4 % (ref 0.5–7.8)
ERYTHROCYTE [DISTWIDTH] IN BLOOD BY AUTOMATED COUNT: 15.9 % (ref 11.9–14.6)
GLUCOSE SERPL-MCNC: 73 MG/DL (ref 65–100)
HCT VFR BLD AUTO: 31.9 % (ref 41.1–50.3)
HGB BLD-MCNC: 10.6 G/DL (ref 13.6–17.2)
IMM GRANULOCYTES # BLD: 0 K/UL (ref 0–0.5)
IMM GRANULOCYTES NFR BLD AUTO: 0 % (ref 0–5)
LYMPHOCYTES # BLD: 1.5 K/UL (ref 0.5–4.6)
LYMPHOCYTES NFR BLD: 31 % (ref 13–44)
MCH RBC QN AUTO: 29 PG (ref 26.1–32.9)
MCHC RBC AUTO-ENTMCNC: 33.2 G/DL (ref 31.4–35)
MCV RBC AUTO: 87.4 FL (ref 79.6–97.8)
MONOCYTES # BLD: 0.5 K/UL (ref 0.1–1.3)
MONOCYTES NFR BLD: 9 % (ref 4–12)
NEUTS SEG # BLD: 2.7 K/UL (ref 1.7–8.2)
NEUTS SEG NFR BLD: 55 % (ref 43–78)
PLATELET # BLD AUTO: 168 K/UL (ref 150–450)
PMV BLD AUTO: 10.6 FL (ref 10.8–14.1)
POTASSIUM SERPL-SCNC: 3.4 MMOL/L (ref 3.5–5.1)
RBC # BLD AUTO: 3.65 M/UL (ref 4.23–5.67)
SERVICE CMNT-IMP: ABNORMAL
SODIUM SERPL-SCNC: 143 MMOL/L (ref 136–145)
WBC # BLD AUTO: 4.9 K/UL (ref 4.3–11.1)

## 2018-01-08 PROCEDURE — 74011250636 HC RX REV CODE- 250/636: Performed by: HOSPITALIST

## 2018-01-08 PROCEDURE — 80048 BASIC METABOLIC PNL TOTAL CA: CPT | Performed by: INTERNAL MEDICINE

## 2018-01-08 PROCEDURE — 74011250636 HC RX REV CODE- 250/636: Performed by: EMERGENCY MEDICINE

## 2018-01-08 PROCEDURE — 65660000000 HC RM CCU STEPDOWN

## 2018-01-08 PROCEDURE — 85025 COMPLETE CBC W/AUTO DIFF WBC: CPT | Performed by: INTERNAL MEDICINE

## 2018-01-08 PROCEDURE — 36415 COLL VENOUS BLD VENIPUNCTURE: CPT | Performed by: INTERNAL MEDICINE

## 2018-01-08 PROCEDURE — 74011250637 HC RX REV CODE- 250/637: Performed by: INTERNAL MEDICINE

## 2018-01-08 PROCEDURE — 74011000258 HC RX REV CODE- 258: Performed by: HOSPITALIST

## 2018-01-08 PROCEDURE — 74011250637 HC RX REV CODE- 250/637: Performed by: HOSPITALIST

## 2018-01-08 RX ORDER — POTASSIUM CHLORIDE 14.9 MG/ML
20 INJECTION INTRAVENOUS ONCE
Status: DISCONTINUED | OUTPATIENT
Start: 2018-01-08 | End: 2018-01-08

## 2018-01-08 RX ORDER — CEFPODOXIME PROXETIL 200 MG/1
200 TABLET, FILM COATED ORAL EVERY 24 HOURS
Status: DISCONTINUED | OUTPATIENT
Start: 2018-01-08 | End: 2018-01-11 | Stop reason: HOSPADM

## 2018-01-08 RX ADMIN — POTASSIUM CHLORIDE: 2 INJECTION, SOLUTION, CONCENTRATE INTRAVENOUS at 17:48

## 2018-01-08 RX ADMIN — DOCUSATE SODIUM 100 MG: 100 CAPSULE, LIQUID FILLED ORAL at 17:12

## 2018-01-08 RX ADMIN — HYDRALAZINE HYDROCHLORIDE 50 MG: 50 TABLET, FILM COATED ORAL at 09:19

## 2018-01-08 RX ADMIN — FINASTERIDE 5 MG: 5 TABLET, FILM COATED ORAL at 09:19

## 2018-01-08 RX ADMIN — LEVOTHYROXINE SODIUM 100 MCG: 100 TABLET ORAL at 05:19

## 2018-01-08 RX ADMIN — Medication 1 AMPULE: at 21:36

## 2018-01-08 RX ADMIN — Medication 10 ML: at 05:18

## 2018-01-08 RX ADMIN — HYDRALAZINE HYDROCHLORIDE 50 MG: 50 TABLET, FILM COATED ORAL at 16:05

## 2018-01-08 RX ADMIN — CEFPODOXIME PROXETIL 200 MG: 200 TABLET, FILM COATED ORAL at 10:39

## 2018-01-08 RX ADMIN — ENOXAPARIN SODIUM 30 MG: 30 INJECTION SUBCUTANEOUS at 23:37

## 2018-01-08 RX ADMIN — METOPROLOL TARTRATE 25 MG: 25 TABLET, FILM COATED ORAL at 09:19

## 2018-01-08 RX ADMIN — Medication 10 ML: at 21:38

## 2018-01-08 RX ADMIN — ASPIRIN 81 MG: 81 TABLET, COATED ORAL at 09:19

## 2018-01-08 RX ADMIN — DOCUSATE SODIUM 100 MG: 100 CAPSULE, LIQUID FILLED ORAL at 09:19

## 2018-01-08 RX ADMIN — FERROUS SULFATE TAB 325 MG (65 MG ELEMENTAL FE) 325 MG: 325 (65 FE) TAB at 05:19

## 2018-01-08 RX ADMIN — Medication 1 AMPULE: at 09:21

## 2018-01-08 RX ADMIN — METOPROLOL TARTRATE 25 MG: 25 TABLET, FILM COATED ORAL at 21:38

## 2018-01-08 RX ADMIN — BACLOFEN 10 MG: 10 TABLET ORAL at 17:12

## 2018-01-08 RX ADMIN — Medication 10 ML: at 13:47

## 2018-01-08 RX ADMIN — HYDRALAZINE HYDROCHLORIDE 50 MG: 50 TABLET, FILM COATED ORAL at 21:37

## 2018-01-08 RX ADMIN — BACLOFEN 10 MG: 10 TABLET ORAL at 09:19

## 2018-01-08 RX ADMIN — PANTOPRAZOLE SODIUM 40 MG: 40 TABLET, DELAYED RELEASE ORAL at 05:18

## 2018-01-08 NOTE — PROGRESS NOTES
Progress Note    Patient: Perlie Bernheim MRN: [id number]  SSN: [id number]    YOB: 1931  Age: 80 y.o. Sex: male      Admit Date: 1/5/2018    LOS: 3 days     Subjective:   81 y/o male with hx dementia, CAD, AAA repair in August, PAF presented from [facility] rehab for near-syncope, confusion and hypotension. A non-contrast head CT was negative. UA is positive > 100 WBC. Bun 32/Cr 1.42. Urine culture positive for K. Pneumoniae. Rocephin was changed to oral vantin according to sensitivities. His clinical status improved. PT/OT evaluation in light of his syncopal event and unsteady gait. Care manager involved and pre-certification started. Patient examined at bedside. Denies nausea, vomiting, diarrhea, abdominal pain. ROS: all pertinent findings described in my note. Objective:     Vitals:    01/07/18 1905 01/08/18 0055 01/08/18 0443 01/08/18 0729   BP: 125/47 126/57 122/56 123/58   Pulse: 79 79 61 65   Resp: 18 18 18 18   Temp: 98.2 °F (36.8 °C) 98.1 °F (36.7 °C) 98.2 °F (36.8 °C) 98 °F (36.7 °C)   SpO2: 97% 93% 95% 96%   Weight:       Height:            Intake and Output:  Current Shift:    Last three shifts:      Physical Exam:   GENERAL: alert, cooperative, no distress, appears stated age  EYE: negative  LYMPHATIC: Cervical, supraclavicular, and axillary nodes normal.   THROAT & NECK: normal and no erythema or exudates noted. LUNG: clear to auscultation bilaterally  HEART: regular rate and rhythm, S1, S2 normal, no murmur, click, rub or gallop  ABDOMEN: soft, non-tender. Bowel sounds normal. No masses,  no organomegaly  EXTREMITIES:  extremities normal, atraumatic, no cyanosis or edema  SKIN: Normal.  NEUROLOGIC: negative  PSYCHIATRIC: non focal    Lab/Data Review: All lab results for the last 24 hours reviewed.        Assessment:     Principal Problem:    Acute UTI (urinary tract infection) (1/5/2018)    Active Problems:    Abdominal aortic aneurysm (AAA) without rupture ([facility] (8/18/2017)      PAF (paroxysmal atrial fibrillation) ([facility] (8/22/2017)      Confusion (1/5/2018)        Plan:     -Non-complicated UTI, due to K pneumoniae : discontinue rocephin- start vantin EOT: 1/12/18   -Hypokalemia: replaced with kcl   -Episode of hypotension, near syncope, likely due to UTI, medication induced : resolved  -Dementia: observe- if agitation give haldol prn  -History of P A fib: on NSR, monitor, on lopressor, asa   -Hypokalemia: resolved     · lovenox for DVT prophylaxis    PT/OT - has unsteady gait- will need STR-  Care manager involvement - Awaiting pre-certification approval     Signed By: Angel Talbot MD     January 8, 2018

## 2018-01-08 NOTE — PROGRESS NOTES
Rounded every hour and prn. Ate from meal trays. Denied n/v. Appeared sleepy mid morning and slept more but was easily arousable. Took meds without difficulty. Denied pain.

## 2018-01-08 NOTE — PROGRESS NOTES
Hourly rounds completed. No complaints of pain. Daughter in law Yuko Aguirre called, would like to speak about placement options, number is [phone].

## 2018-01-08 NOTE — INTERDISCIPLINARY ROUNDS
Interdisciplinary team rounds were held 1/8/2018 with the following team members:Care Management, Nursing, Pharmacy, Physical Therapy, Physician and Respiratory Therapy. Plan of care discussed. See clinical pathway and/or care plan for interventions and desired outcomes. Awaiting insurance approval for discharge to [facility].

## 2018-01-08 NOTE — PROGRESS NOTES
Problem: Nutrition Deficit  Goal: *Optimize nutritional status  Nutrition  Reason for assessment: Referral received from nursing admission Malnutrition Screening Tool for recently lost 14-23# without trying and eating poorly due to decreased appetite. Assessment:   Diet order(s): Cardiac  Food/Nutrition Patient History:  The patient is noted to have a h/o Dementia, PAF and AAA s/p repair. The patient is currently sleeping and would not wake for RD. Breakfast was sitting on bedside table and the patient had consumed 100% of it. Weight history in the EMR cannot be verified as accurate due to unknown weight source (pt stated vs estimated vs measured). WT / BMI WEIGHT   1/7/2018 128 lb 12.8 oz   8/16/2017 147 lb 9.6 oz   8/1/2017 6/12/2017 145 lb   3/8/2017 145 lb   According to the EMR the patient has potentially lost ~19# over the past 5 months. This is a clinically significant weight loss of 12.9% over the past 5 months. Anthropometrics:Height: 5' 4\" (162.6 cm),  Weight: 58.4 kg (128 lb 12.8 oz), Weight Source: Bed, Body mass index is 22.11 kg/(m^2). BMI class of underweight for age >71. Macronutrient needs:  EER:  8700-3820 kcal /day (25-30 kcal/kg actual BW)  EPR:  58-70 grams protein/day (1-1.2 grams/kg actual BW)  Intake/Comparative Standards: Based on RD meal round the patient consumed 100% of breakfast. Average intake for past 1 recorded meal(s): 50%. This potentially meets ~100% of kcal and ~100% of protein needs    Nutrition Diagnosis: Underweight related to unintended weight loss as evidenced by patient with 19# (12.9%) weight loss over the past 5 months and current BMI of 22.1 (underweight for age). Intervention:  Meals and snacks: Continue current diet. Nutrition Supplement Therapy: Add Ensure TID   Nutrition Discharge Plan: Too soon to be determinded    Ofelia Salazar [address], [address] [place] [address], [phone]

## 2018-01-08 NOTE — PROGRESS NOTES
Contacted [facility] twice for pt's [facility] to reconcile medications. Facility has yet to fax MAR. Will follow.

## 2018-01-08 NOTE — PROGRESS NOTES
Patient is from [facility] and has a bed hold and the plan is for him to return to the facility when appropriate. He is a [employer] precert and they started pre cert today. Evaneasafia Handler Luciana Ruiz

## 2018-01-09 PROBLEM — N39.0 UTI (URINARY TRACT INFECTION): Status: ACTIVE | Noted: 2018-01-09

## 2018-01-09 LAB
ANION GAP SERPL CALC-SCNC: 10 MMOL/L (ref 7–16)
BASOPHILS # BLD: 0 K/UL (ref 0–0.2)
BASOPHILS NFR BLD: 0 % (ref 0–2)
BUN SERPL-MCNC: 25 MG/DL (ref 8–23)
CALCIUM SERPL-MCNC: 9 MG/DL (ref 8.3–10.4)
CHLORIDE SERPL-SCNC: 105 MMOL/L (ref 98–107)
CO2 SERPL-SCNC: 26 MMOL/L (ref 21–32)
CREAT SERPL-MCNC: 1.4 MG/DL (ref 0.8–1.5)
DIFFERENTIAL METHOD BLD: ABNORMAL
EOSINOPHIL # BLD: 0.3 K/UL (ref 0–0.8)
EOSINOPHIL NFR BLD: 4 % (ref 0.5–7.8)
ERYTHROCYTE [DISTWIDTH] IN BLOOD BY AUTOMATED COUNT: 15.7 % (ref 11.9–14.6)
GLUCOSE SERPL-MCNC: 102 MG/DL (ref 65–100)
HCT VFR BLD AUTO: 35.7 % (ref 41.1–50.3)
HGB BLD-MCNC: 11.7 G/DL (ref 13.6–17.2)
IMM GRANULOCYTES # BLD: 0 K/UL (ref 0–0.5)
IMM GRANULOCYTES NFR BLD AUTO: 0 % (ref 0–5)
LYMPHOCYTES # BLD: 1.7 K/UL (ref 0.5–4.6)
LYMPHOCYTES NFR BLD: 26 % (ref 13–44)
MCH RBC QN AUTO: 28.8 PG (ref 26.1–32.9)
MCHC RBC AUTO-ENTMCNC: 32.8 G/DL (ref 31.4–35)
MCV RBC AUTO: 87.9 FL (ref 79.6–97.8)
MONOCYTES # BLD: 0.5 K/UL (ref 0.1–1.3)
MONOCYTES NFR BLD: 8 % (ref 4–12)
NEUTS SEG # BLD: 4 K/UL (ref 1.7–8.2)
NEUTS SEG NFR BLD: 62 % (ref 43–78)
PLATELET # BLD AUTO: 182 K/UL (ref 150–450)
PMV BLD AUTO: 10.6 FL (ref 10.8–14.1)
POTASSIUM SERPL-SCNC: 3.8 MMOL/L (ref 3.5–5.1)
RBC # BLD AUTO: 4.06 M/UL (ref 4.23–5.67)
SODIUM SERPL-SCNC: 141 MMOL/L (ref 136–145)
WBC # BLD AUTO: 6.6 K/UL (ref 4.3–11.1)

## 2018-01-09 PROCEDURE — 74011250637 HC RX REV CODE- 250/637: Performed by: INTERNAL MEDICINE

## 2018-01-09 PROCEDURE — 74011250636 HC RX REV CODE- 250/636: Performed by: EMERGENCY MEDICINE

## 2018-01-09 PROCEDURE — 36415 COLL VENOUS BLD VENIPUNCTURE: CPT | Performed by: INTERNAL MEDICINE

## 2018-01-09 PROCEDURE — 80048 BASIC METABOLIC PNL TOTAL CA: CPT | Performed by: INTERNAL MEDICINE

## 2018-01-09 PROCEDURE — 74011250637 HC RX REV CODE- 250/637: Performed by: HOSPITALIST

## 2018-01-09 PROCEDURE — 65270000029 HC RM PRIVATE

## 2018-01-09 PROCEDURE — 85025 COMPLETE CBC W/AUTO DIFF WBC: CPT | Performed by: INTERNAL MEDICINE

## 2018-01-09 PROCEDURE — 99218 HC RM OBSERVATION: CPT

## 2018-01-09 RX ADMIN — BACLOFEN 10 MG: 10 TABLET ORAL at 18:05

## 2018-01-09 RX ADMIN — ASPIRIN 81 MG: 81 TABLET, COATED ORAL at 08:57

## 2018-01-09 RX ADMIN — Medication 10 ML: at 05:18

## 2018-01-09 RX ADMIN — FERROUS SULFATE TAB 325 MG (65 MG ELEMENTAL FE) 325 MG: 325 (65 FE) TAB at 05:18

## 2018-01-09 RX ADMIN — Medication 10 ML: at 21:34

## 2018-01-09 RX ADMIN — HYDRALAZINE HYDROCHLORIDE 50 MG: 50 TABLET, FILM COATED ORAL at 21:34

## 2018-01-09 RX ADMIN — DOCUSATE SODIUM 100 MG: 100 CAPSULE, LIQUID FILLED ORAL at 08:57

## 2018-01-09 RX ADMIN — METOPROLOL TARTRATE 25 MG: 25 TABLET, FILM COATED ORAL at 08:57

## 2018-01-09 RX ADMIN — LEVOTHYROXINE SODIUM 100 MCG: 100 TABLET ORAL at 05:18

## 2018-01-09 RX ADMIN — HYDRALAZINE HYDROCHLORIDE 50 MG: 50 TABLET, FILM COATED ORAL at 08:57

## 2018-01-09 RX ADMIN — HYDRALAZINE HYDROCHLORIDE 50 MG: 50 TABLET, FILM COATED ORAL at 15:23

## 2018-01-09 RX ADMIN — CEFPODOXIME PROXETIL 200 MG: 200 TABLET, FILM COATED ORAL at 09:02

## 2018-01-09 RX ADMIN — Medication 10 ML: at 05:19

## 2018-01-09 RX ADMIN — Medication 10 ML: at 15:06

## 2018-01-09 RX ADMIN — Medication 1 AMPULE: at 21:34

## 2018-01-09 RX ADMIN — Medication 1 AMPULE: at 08:57

## 2018-01-09 RX ADMIN — METOPROLOL TARTRATE 25 MG: 25 TABLET, FILM COATED ORAL at 21:34

## 2018-01-09 RX ADMIN — BACLOFEN 10 MG: 10 TABLET ORAL at 08:57

## 2018-01-09 RX ADMIN — FINASTERIDE 5 MG: 5 TABLET, FILM COATED ORAL at 08:57

## 2018-01-09 RX ADMIN — ENOXAPARIN SODIUM 30 MG: 30 INJECTION SUBCUTANEOUS at 23:28

## 2018-01-09 RX ADMIN — Medication 10 ML: at 21:35

## 2018-01-09 RX ADMIN — PANTOPRAZOLE SODIUM 40 MG: 40 TABLET, DELAYED RELEASE ORAL at 05:18

## 2018-01-09 NOTE — PROGRESS NOTES
Progress Note    Patient: Perlie Bernheim MRN: [id number]  SSN: [id number]    YOB: 1931  Age: 80 y.o. Sex: male      Admit Date: 1/5/2018    LOS: 4 days     Subjective:   79 y/o male with hx dementia, CAD, AAA repair in August, PAF presented from [facility] rehab for near-syncope, confusion and hypotension. A non-contrast head CT was negative. UA is positive > 100 WBC. Bun 32/Cr 1.42. Urine culture positive for K. Pneumoniae. Rocephin was changed to oral vantin according to sensitivities. His clinical status improved. PT/OT evaluation in light of his syncopal event and unsteady gait. Care manager involved and pre-certification started. Patient denied new complaints. Resting in bed and working on his lunch. ROS: all pertinent findings described in my note. Objective:     Vitals:    01/08/18 2304 01/09/18 0436 01/09/18 0740 01/09/18 1152   BP: 148/71 158/66 (!) 170/109 132/48   Pulse: 72 65 72 65   Resp: 18 18 18 18   Temp: 98.4 °F (36.9 °C) 98 °F (36.7 °C) 98.4 °F (36.9 °C) 98.5 °F (36.9 °C)   SpO2: 96% 97% 96% 93%   Weight:       Height:            Intake and Output:  Current Shift:    Last three shifts: 01/07 1901 - 01/09 0700  In: 120 [P.O.:120]  Out: -     Physical Exam:   GENERAL: alert, cooperative, no distress, appears stated age, pleasant. EYE: negative  LYMPHATIC: Cervical, supraclavicular, and axillary nodes normal.   THROAT & NECK: normal and no erythema or exudates noted. LUNG: clear to auscultation bilaterally  HEART: regular rate and rhythm, S1, S2 normal, no murmur, click, rub or gallop  ABDOMEN: soft, non-tender. Bowel sounds normal. No masses,  no organomegaly  EXTREMITIES:  extremities normal, atraumatic, no cyanosis or edema  SKIN: Normal.  NEUROLOGIC: negative  PSYCHIATRIC: non focal    Lab/Data Review: All lab results for the last 24 hours reviewed.    Recent Results (from the past 24 hour(s))   CBC WITH AUTOMATED DIFF    Collection Time: 01/09/18 7:14 AM   Result Value Ref Range    WBC 6.6 4.3 - 11.1 K/uL    RBC 4.06 (L) 4.23 - 5.67 M/uL    HGB 11.7 (L) 13.6 - 17.2 g/dL    HCT 35.7 (L) 41.1 - 50.3 %    MCV 87.9 79.6 - 97.8 FL    MCH 28.8 26.1 - 32.9 PG    MCHC 32.8 31.4 - 35.0 g/dL    RDW 15.7 (H) 11.9 - 14.6 %    PLATELET 324 219 - 240 K/uL    MPV 10.6 (L) 10.8 - 14.1 FL    DF AUTOMATED      NEUTROPHILS 62 43 - 78 %    LYMPHOCYTES 26 13 - 44 %    MONOCYTES 8 4.0 - 12.0 %    EOSINOPHILS 4 0.5 - 7.8 %    BASOPHILS 0 0.0 - 2.0 %    IMMATURE GRANULOCYTES 0 0.0 - 5.0 %    ABS. NEUTROPHILS 4.0 1.7 - 8.2 K/UL    ABS. LYMPHOCYTES 1.7 0.5 - 4.6 K/UL    ABS. MONOCYTES 0.5 0.1 - 1.3 K/UL    ABS. EOSINOPHILS 0.3 0.0 - 0.8 K/UL    ABS. BASOPHILS 0.0 0.0 - 0.2 K/UL    ABS. IMM. GRANS. 0.0 0.0 - 0.5 K/UL   METABOLIC PANEL, BASIC    Collection Time: 01/09/18  7:14 AM   Result Value Ref Range    Sodium 141 136 - 145 mmol/L    Potassium 3.8 3.5 - 5.1 mmol/L    Chloride 105 98 - 107 mmol/L    CO2 26 21 - 32 mmol/L    Anion gap 10 7 - 16 mmol/L    Glucose 102 (H) 65 - 100 mg/dL    BUN 25 (H) 8 - 23 MG/DL    Creatinine 1.40 0.8 - 1.5 MG/DL    GFR est AA >60 >60 ml/min/1.73m2    GFR est non-AA 51 (L) >60 ml/min/1.73m2    Calcium 9.0 8.3 - 10.4 MG/DL         Assessment:     Principal Problem:    Acute UTI (urinary tract infection) (1/5/2018)    Active Problems:    Abdominal aortic aneurysm (AAA) without rupture ([facility] (8/18/2017)      [facility]ox[facility] (8/22/2017)      Confusion (1/5/2018)      UTI (urinary tract infection) (1/9/2018)        Plan:     -Non-complicated UTI, due to K pneumoniae : discontinue rocephin- start vantin EOT: 1/12/18   -Hypokalemia: replaced with kcl, improved. -Episode of hypotension, near syncope, likely due to UTI, medication induced : resolved  -Dementia: observe- if agitation give haldol prn.  So far, pleasant.   -History of P A fib: on NSR, monitor, on lopressor, asa   -Hypokalemia: resolved     · lovenox for DVT prophylaxis    PT/OT - has unsteady gait- will need STR-  Care manager involvement - Awaiting pre-certification approval     I discussed the plan of care with patient.      Signed By: Sammi Sosa MD     January 9, 2018

## 2018-01-09 NOTE — PROGRESS NOTES
Spoke with Daughter in law, Jaylen Lor, expressed concerned about missing items including dentures and glasses. Admission database showed pt had only came to ER with clothing, reported to daughter in law. Social work consult put in. Notified MD Mukherjee of Pt history and lower HR in 40's earlier today. New orders for tele. Running NSR 66. Hourly rounding continued. Will continue to monitor.

## 2018-01-09 NOTE — PHYSICIAN ADVISORY
Letter of Determination:  Outpatient status receiving Observation Services    This patient was originally hospitalized as inpatient status on 1/5/2018 for near syncope. At this time this patient does not appear to meet the medical necessity requirements in CMS regulation Section 43 .3 to support an inpatient level of care. It is our recommendation that this patient's hospitalization status should be changed from INPATIENT to OUTPATIENT receiving OBSERVATION services via Condition Code 44.      This may change due to the medical condition of the patient and new clinical evidence as the patients care progreses. The final decision regarding the patient's hospitalization status depends on the attending physician's judgement.     Robert Jesse VIRAMONTES, CARMEN,   Physician [place].

## 2018-01-09 NOTE — PROGRESS NOTES
CM Specialist Malathi Philippe attempted to provide patient with Code 40 and [address] letter. Patient was asleep and SW Abraham Daughters was notified and Abraham Daughters indicated that patient was not alert. CM will attempt later.

## 2018-01-10 PROCEDURE — 74011250636 HC RX REV CODE- 250/636: Performed by: EMERGENCY MEDICINE

## 2018-01-10 PROCEDURE — 65270000029 HC RM PRIVATE

## 2018-01-10 PROCEDURE — 74011250637 HC RX REV CODE- 250/637: Performed by: INTERNAL MEDICINE

## 2018-01-10 PROCEDURE — 97530 THERAPEUTIC ACTIVITIES: CPT

## 2018-01-10 PROCEDURE — 99218 HC RM OBSERVATION: CPT

## 2018-01-10 PROCEDURE — 74011250637 HC RX REV CODE- 250/637: Performed by: HOSPITALIST

## 2018-01-10 RX ADMIN — DOCUSATE SODIUM 100 MG: 100 CAPSULE, LIQUID FILLED ORAL at 17:11

## 2018-01-10 RX ADMIN — ENOXAPARIN SODIUM 30 MG: 30 INJECTION SUBCUTANEOUS at 22:30

## 2018-01-10 RX ADMIN — Medication 1 AMPULE: at 09:37

## 2018-01-10 RX ADMIN — PANTOPRAZOLE SODIUM 40 MG: 40 TABLET, DELAYED RELEASE ORAL at 05:54

## 2018-01-10 RX ADMIN — HYDRALAZINE HYDROCHLORIDE 50 MG: 50 TABLET, FILM COATED ORAL at 22:25

## 2018-01-10 RX ADMIN — FINASTERIDE 5 MG: 5 TABLET, FILM COATED ORAL at 09:36

## 2018-01-10 RX ADMIN — DOCUSATE SODIUM 100 MG: 100 CAPSULE, LIQUID FILLED ORAL at 09:36

## 2018-01-10 RX ADMIN — Medication 1 AMPULE: at 22:31

## 2018-01-10 RX ADMIN — Medication 10 ML: at 05:53

## 2018-01-10 RX ADMIN — BACLOFEN 10 MG: 10 TABLET ORAL at 17:11

## 2018-01-10 RX ADMIN — DICLOFENAC SODIUM 4 G: 10 GEL TOPICAL at 22:00

## 2018-01-10 RX ADMIN — Medication 10 ML: at 13:26

## 2018-01-10 RX ADMIN — CEFPODOXIME PROXETIL 200 MG: 200 TABLET, FILM COATED ORAL at 09:36

## 2018-01-10 RX ADMIN — HYDRALAZINE HYDROCHLORIDE 50 MG: 50 TABLET, FILM COATED ORAL at 17:10

## 2018-01-10 RX ADMIN — HYDRALAZINE HYDROCHLORIDE 50 MG: 50 TABLET, FILM COATED ORAL at 09:36

## 2018-01-10 RX ADMIN — BACLOFEN 10 MG: 10 TABLET ORAL at 09:36

## 2018-01-10 RX ADMIN — LEVOTHYROXINE SODIUM 100 MCG: 100 TABLET ORAL at 05:16

## 2018-01-10 RX ADMIN — ASPIRIN 81 MG: 81 TABLET, COATED ORAL at 09:36

## 2018-01-10 RX ADMIN — Medication 10 ML: at 22:27

## 2018-01-10 RX ADMIN — FERROUS SULFATE TAB 325 MG (65 MG ELEMENTAL FE) 325 MG: 325 (65 FE) TAB at 05:54

## 2018-01-10 RX ADMIN — METOPROLOL TARTRATE 25 MG: 25 TABLET, FILM COATED ORAL at 22:25

## 2018-01-10 RX ADMIN — METOPROLOL TARTRATE 25 MG: 25 TABLET, FILM COATED ORAL at 09:00

## 2018-01-10 RX ADMIN — Medication 10 ML: at 22:26

## 2018-01-10 NOTE — PROGRESS NOTES
Rounded every hour and prn. Daughter in law is Court Tapia and she can be reached at [phone]. She was asking about the pt's upper dentures that are missing. She was provided pt relations phone number. She said that she asked [facility] about dentures and glasses and the facility said the pt was sent with dentures. Interestingly pt has been eating without difficulty from meal trays. Daughter is law has concerns relating to pt's [employer] status. She reports the 'girl in the Office' at [facility] told her that they didn't feel he needed to continue to still pay for [facility] but he could 'be straight Medicaid'? This was unclear but the daughter in law would very much appreciate a call from . She was able to speak with the hospitalist at the end of the shift. Thank you Dr. Desiree Kinsey.

## 2018-01-10 NOTE — PROGRESS NOTES
Progress Note    Patient: Luis Finney MRN: [id number]  SSN: [id number]    YOB: 1931  Age: 80 y.o. Sex: male      Admit Date: 1/5/2018    LOS: 4 days     Subjective:   79 y/o male with  dementia, CAD, AAA repair in August, PAF presented from [facility] rehab for near-syncope, confusion and hypotension. A non-contrast head CT was negative. UA is positive > 100 WBC. Bun 32/Cr 1.42. Urine culture positive for K. Pneumoniae. Rocephin was changed to oral vantin according to sensitivities. His clinical status improved. PT/OT evaluation in light of his syncopal event and unsteady gait. Care manager involved and pre-certification started. Today patient denied new complaints. Resting in bed. \" I am feeling fine. \"    ROS: all pertinent findings described in my note. Objective:     Vitals:    01/09/18 1924 01/09/18 2352 01/10/18 0517 01/10/18 0727   BP: 125/49 105/49 127/54 124/63   Pulse: 74 68 67 66   Resp: 18 18 18 16   Temp: 98.4 °F (36.9 °C) 97.8 °F (36.6 °C) 97.7 °F (36.5 °C) 98 °F (36.7 °C)   SpO2: 97% 94% 95% 94%   Weight:       Height:            Physical Exam:   GENERAL: alert, cooperative, no respiratory distress, appears stated age, pleasant. Lying flat in bed. EYE: negative  LYMPHATIC: Cervical, supraclavicular, and axillary nodes normal.   THROAT & NECK: normal and no erythema or exudates noted. LUNG: clear to auscultation bilaterally  HEART: regular rate and rhythm, S1, S2 normal, no murmur, click, rub or gallop  ABDOMEN: soft, non-tender. Bowel sounds normal. No masses,  no organomegaly  EXTREMITIES:  extremities normal, atraumatic, no cyanosis or edema  SKIN: Normal.  NEUROLOGIC: negative  PSYCHIATRIC: non focal    Lab/Data Review:  I have reviewed all the lab results.        Current Facility-Administered Medications:     cefpodoxime (VANTIN) tablet 200 mg, 200 mg, Oral, Q24H, Dayana Duvall MD, 200 mg at 01/09/18 0902    alcohol 62% (NOZIN) nasal  1 Ampule, 1 Ampule, Topical, Q12H, Qamar Dawn MD, 1 Ampule at 01/09/18 2134    influenza vaccine 2017-18 (3 yrs+)(PF) (FLUZONE QUAD/FLUARIX QUAD) injection 0.5 mL, 0.5 mL, IntraMUSCular, PRIOR TO DISCHARGE, Joellen Kamara MD    sodium chloride (NS) flush 5-10 mL, 5-10 mL, IntraVENous, Q8H, Julissa Ramon MD, 10 mL at 01/10/18 0553    sodium chloride (NS) flush 5-10 mL, 5-10 mL, IntraVENous, PRN, Julissa Ramon MD    baclofen (LIORESAL) tablet 10 mg, 10 mg, Oral, BID, Joellen Kamara MD, 10 mg at 01/09/18 1805    ferrous sulfate tablet 325 mg, 325 mg, Oral, ACB, Joellen Kamara MD, 325 mg at 01/10/18 0554    pantoprazole (PROTONIX) tablet 40 mg, 40 mg, Oral, ACB, Joellen Kamara MD, 40 mg at 01/10/18 0554    docusate sodium (COLACE) capsule 100 mg, 100 mg, Oral, BID, Joellen Kamara MD, Stopped at 01/09/18 1800    levothyroxine (SYNTHROID) tablet 100 mcg, 100 mcg, Oral, ACB, Joellen Kamara MD, 100 mcg at 01/10/18 0516    finasteride (PROSCAR) tablet 5 mg, 5 mg, Oral, DAILY, Joellen Kamara MD, 5 mg at 01/09/18 0857    diclofenac (VOLTAREN) 1 % topical gel 4 g (Patient Supplied), 4 g, Topical, QID, Joellen Kamara MD, Stopped at 01/07/18 2200    aspirin delayed-release tablet 81 mg, 81 mg, Oral, DAILY, Joellen Kamara MD, 81 mg at 01/09/18 0857    metoprolol tartrate (LOPRESSOR) tablet 25 mg, 25 mg, Oral, Q12H, Joellen Kamara MD, 25 mg at 01/09/18 2134    hydrALAZINE (APRESOLINE) tablet 50 mg, 50 mg, Oral, TID, Joellen Kamara MD, 50 mg at 01/09/18 2134    sodium chloride (NS) flush 5-10 mL, 5-10 mL, IntraVENous, Q8H, Joellen Kamara MD, 10 mL at 01/10/18 0553    sodium chloride (NS) flush 5-10 mL, 5-10 mL, IntraVENous, PRN, Joellen Kamara MD    acetaminophen (TYLENOL) tablet 650 mg, 650 mg, Oral, Q4H PRN, Joellen Kamara MD    ondansetron [facility]) injection 4 mg, 4 mg, IntraVENous, Q4H PRN, Joellen Kamara MD    enoxaparin (LOVENOX) injection 30 mg, 30 mg, SubCUTAneous, Q24H, Micha Alexandra MD, 30 mg at 01/09/18 5150      Assessment:     Principal Problem:    Acute UTI (urinary tract infection) (1/5/2018)    Active Problems:    Abdominal aortic aneurysm (AAA) without rupture ([facility] (8/18/2017)      PAF (paroxysmal atrial fibrillation) ([facility] (8/22/2017)      Confusion (1/5/2018)      UTI (urinary tract infection) (1/9/2018)        Plan:     -Non-complicated UTI, due to K pneumoniae : discontinued rocephin- started Vantin to continue until: 1/12/18   -Hypokalemia: replaced with kcl, improved. -Episode of hypotension, near syncope, likely due to UTI, medication induced : resolved  -Dementia: observe- if agitation give haldol prn. So far, pleasant.   -History of P A fib: on NSR, monitor, on lopressor, asa   -Hypokalemia: resolved     · lovenox for DVT prophylaxis    PT/OT - has unsteady gait- will need STR-  Care manager involvement - Awaiting pre-certification approval     I discussed the plan of care with patient.      Signed By: Franklyn Wen MD     January 10, 2018

## 2018-01-10 NOTE — PROGRESS NOTES
Hourly rounding continued. No needs at this time. Running NSR at 77 via telemetry/ Will continue to monitor.

## 2018-01-10 NOTE — PROGRESS NOTES
Problem: Mobility Impaired (Adult and Pediatric)  Goal: *Acute Goals and Plan of Care (Insert Text)  LTG:  (1.)Mr. Scheyder will move from supine to sit and sit to supine , scoot up and down and roll side to side in bed with INDEPENDENCE within 4-7 day(s). (2.)Mr. Scheyder will transfer from bed to chair and chair to bed with SUPERVISION using the least restrictive device within 4-7 day(s). (3.)Mr. Scheyder will ambulate with SUPERVISION for 50 feet with the least restrictive device within 4-7 day(s). ________________________________________________________________________________________________     PHYSICAL THERAPY: Treatment Day: 1st, AM 1/10/2018  OBSERVATION: Hospital Day: 6  Payor: Osvaldo Foot / Plan: [facility] HUMANA MEDICARE CHOICE PPO/PFFS / Product Type: [facility] Medicare /      NAME/AGE/GENDER: Holli Rico is a 80 y.o. male   PRIMARY DIAGNOSIS: Acute UTI (urinary tract infection)  Confusion  UTI (urinary tract infection)  Confusion Acute UTI (urinary tract infection) Acute UTI (urinary tract infection)        ICD-10: Treatment Diagnosis:   · Generalized Muscle Weakness (M62.81)  · Difficulty in walking, Not elsewhere classified (R26.2)  · Other abnormalities of gait and mobility (R26.89)   Precaution/Allergies:  Review of patient's allergies indicates no known allergies. ASSESSMENT:     Mr. Rivka Padilla required min A for bed mobility, mod A for transfers. He required mod-max A for ambulation. He was only able to sidestep a few steps towards the [facility]. He was challenged and fatigued with all functional mobility. He will benefit from continued PT while in the hospital to improve her functional mobility. He will benefit from short-term rehab following hospital D/C. This section established at most recent assessment   PROBLEM LIST (Impairments causing functional limitations):  1. Decreased Strength  2. Decreased ADL/Functional Activities  3.  Decreased Transfer Abilities  4. Decreased Ambulation Ability/Technique  5. Decreased Balance  6. Decreased Activity Tolerance  7. Increased Fatigue  8. Decreased Flexibility/Joint Mobility   INTERVENTIONS PLANNED: (Benefits and precautions of physical therapy have been discussed with the patient.)  1. Balance Exercise  2. Bed Mobility  3. Family Education  4. Gait Training  5. Home Exercise Program (HEP)  6. Neuromuscular Re-education/Strengthening  7. Range of Motion (ROM)  8. Therapeutic Activites  9. Therapeutic Exercise/Strengthening  10. Transfer Training  11. Group Therapy     TREATMENT PLAN: Frequency/Duration: 3 times a week for duration of hospital stay  Rehabilitation Potential For Stated Goals: Arabella Perez REHABILITATION/EQUIPMENT: (at time of discharge pending progress): Due to the probability of continued deficits (see above) this patient will likely need continued skilled physical therapy after discharge. Equipment:    None at this time              HISTORY:   History of Present Injury/Illness (Reason for Referral):  Per MD note: \"81 y/o male with hx dementia, CAD, AAA repair in August, PAF present from [facility] rehab for nrear-syncope, confusion and hypotension. He is a poor historian and does not recall event. Denies any chest pain, shortness of breath, nausea/vomiting or abdominal pain. No reported fever or chills. A non-contrast head CT is negative. UA is positive for greater than 100 WBC, 10-20 RBC and 4+ bacteria. IV antibiotics were started and Hospitalist service consulted for admission. He is also dehydrated. Bun 32/Cr 1.42. \"  Past Medical History/Comorbidities:   Mr. Vincent Gallagher  has a past medical history of CAD (coronary artery disease).   Mr. Vincent Gallagher  has a past surgical history that includes pr cardiac surg procedure unlist.  Social History/Living Environment:   Home Environment: Skilled nursing facility  One/Two Story Residence: One story  Living Alone: No  Support Systems: Friends \ neighbors, Family member(s)  Patient Expects to be Discharged to[de-identified] Skilled nursing facility  Current DME Used/Available at Home: Other (comment)  Prior Level of Function/Work/Activity:  Unable to determine   Number of Personal Factors/Comorbidities that affect the Plan of Care: 1-2: MODERATE COMPLEXITY   EXAMINATION:   Most Recent Physical Functioning:   Gross Assessment:  AROM: Grossly decreased, non-functional  PROM: Grossly decreased, non-functional  Strength: Within functional limits               Posture:  Posture (WDL): Within defined limits  Balance:  Sitting: Impaired  Standing: Impaired Bed Mobility:  Supine to Sit: Minimum assistance  Sit to Supine: Minimum assistance  Scooting: Contact guard assistance  Wheelchair Mobility:     Transfers:  Sit to Stand: Minimum assistance  Stand to Sit: Minimum assistance  Gait:     Distance (ft): 5 Feet (ft)  Assistive Device: Walker, rollator  Ambulation - Level of Assistance: Minimal assistance      Body Structures Involved:  1. Bones  2. Joints  3. Muscles  4. Ligaments Body Functions Affected:  1. Mental  2. Sensory/Pain  3. Neuromusculoskeletal  4. Movement Related Activities and Participation Affected:  1. Learning and Applying Knowledge  2. General Tasks and Demands  3. Mobility  4. Self Care  5. Domestic Life  6. Interpersonal Interactions and Relationships  7. Community, Social and [place]   Number of elements that affect the Plan of Care: 4+: HIGH COMPLEXITY   CLINICAL PRESENTATION:   Presentation: Evolving clinical presentation with changing clinical characteristics: MODERATE COMPLEXITY   CLINICAL DECISION MAKIN [facility] Mobility Inpatient Short Form  How much difficulty does the patient currently have. .. Unable A Lot A Little None   1. Turning over in bed (including adjusting bedclothes, sheets and blankets)? [] 1   [] 2   [x] 3   [] 4   2.   Sitting down on and standing up from a chair with arms ( e.g., wheelchair, bedside commode, etc.)   [] 1   [x] 2   [] 3   [] 4   3. Moving from lying on back to sitting on the side of the bed? [] 1   [] 2   [x] 3   [] 4   How much help from another person does the patient currently need. .. Total A Lot A Little None   4. Moving to and from a bed to a chair (including a wheelchair)? [x] 1   [] 2   [] 3   [] 4   5. Need to walk in hospital room? [x] 1   [] 2   [] 3   [] 4   6. Climbing 3-5 steps with a railing? [x] 1   [] 2   [] 3   [] 4   © 2007, Trustees of [address] [place] [address] [address] [address], under license to [email or web address]. All rights reserved      Score:  Initial: 11 Most Recent: X (Date: -- )    Interpretation of Tool:  Represents activities that are increasingly more difficult (i.e. Bed mobility, Transfers, Gait). Score 24 23 22-20 19-15 14-10 9-7 6     Modifier CH CI CJ CK CL CM CN      ? Mobility - Walking and Moving Around:     - CURRENT STATUS: CL - 60%-79% impaired, limited or restricted    - GOAL STATUS: CK - 40%-59% impaired, limited or restricted    - D/C STATUS:  ---------------To be determined---------------  Payor: HUMANA MEDICARE / Plan: [facility] HUMANA MEDICARE CHOICE PPO/PFFS / Product Type: Managed Care Medicare /      Medical Necessity:     · Patient is expected to demonstrate progress in strength, range of motion, balance and functional technique to improve safety during ambulation and transfers. · Patient demonstrates fair rehab potential due to higher previous functional level. Reason for Services/Other Comments:  · Patient continues to require modification of therapeutic interventions to increase complexity of exercises.    Use of outcome tool(s) and clinical judgement create a POC that gives a: Difficult prediction of patient's progress: HIGH COMPLEXITY            TREATMENT:   (In addition to Assessment/Re-Assessment sessions the following treatments were rendered)   Pre-treatment Symptoms/Complaints:  Pt not verbalizing any complaints  Pain: Initial:   No pain verbalized  Post Session:  No pain verbalized     Therapeutic Activity: (    23 minutes): Therapeutic activities including Bed transfers, Chair transfers and static standing to improve mobility, strength, balance and dynamic movement of leg - bilateral to improve functional mobility. Required moderate verbal and tactile cues   to promote static and dynamic balance in standing. Braces/Orthotics/Lines/Etc:   · O2 Device: Room air  Treatment/Session Assessment:    · Response to Treatment:  Pt reporting no pain at end of session  · Interdisciplinary Collaboration:   o Physical Therapist  o Registered Nurse  · After treatment position/precautions:   o Supine in bed  o Bed/Chair-wheels locked  o Bed in low position  o Call light within reach  o RN notified  o Side rails x 3   · Compliance with Program/Exercises: Will assess as treatment progresses. · Recommendations/Intent for next treatment session: \"Next visit will focus on advancements to more challenging activities and reduction in assistance provided\".   Total Treatment Duration:  PT Patient Time In/Time Out  Time In: 1444  Time Out: PJ Loza

## 2018-01-11 VITALS
TEMPERATURE: 98.3 F | HEART RATE: 62 BPM | OXYGEN SATURATION: 96 % | BODY MASS INDEX: 21.99 KG/M2 | HEIGHT: 64 IN | WEIGHT: 128.8 LBS | DIASTOLIC BLOOD PRESSURE: 51 MMHG | RESPIRATION RATE: 18 BRPM | SYSTOLIC BLOOD PRESSURE: 134 MMHG

## 2018-01-11 PROBLEM — G93.41 ACUTE METABOLIC ENCEPHALOPATHY: Status: ACTIVE | Noted: 2018-01-11

## 2018-01-11 PROCEDURE — 74011250637 HC RX REV CODE- 250/637: Performed by: INTERNAL MEDICINE

## 2018-01-11 PROCEDURE — 74011250636 HC RX REV CODE- 250/636: Performed by: HOSPITALIST

## 2018-01-11 PROCEDURE — 90686 IIV4 VACC NO PRSV 0.5 ML IM: CPT | Performed by: HOSPITALIST

## 2018-01-11 PROCEDURE — 90471 IMMUNIZATION ADMIN: CPT | Performed by: NURSE PRACTITIONER

## 2018-01-11 PROCEDURE — 99218 HC RM OBSERVATION: CPT

## 2018-01-11 PROCEDURE — 74011250637 HC RX REV CODE- 250/637: Performed by: HOSPITALIST

## 2018-01-11 RX ORDER — CEFPODOXIME PROXETIL 200 MG/1
200 TABLET, FILM COATED ORAL EVERY 24 HOURS
Qty: 4 TAB | Refills: 0 | Status: SHIPPED
Start: 2018-01-11 | End: 2018-01-15

## 2018-01-11 RX ADMIN — METOPROLOL TARTRATE 25 MG: 25 TABLET, FILM COATED ORAL at 08:11

## 2018-01-11 RX ADMIN — PANTOPRAZOLE SODIUM 40 MG: 40 TABLET, DELAYED RELEASE ORAL at 05:42

## 2018-01-11 RX ADMIN — ASPIRIN 81 MG: 81 TABLET, COATED ORAL at 08:11

## 2018-01-11 RX ADMIN — CEFPODOXIME PROXETIL 200 MG: 200 TABLET, FILM COATED ORAL at 08:10

## 2018-01-11 RX ADMIN — INFLUENZA VIRUS VACCINE 0.5 ML: 15; 15; 15; 15 SUSPENSION INTRAMUSCULAR at 10:19

## 2018-01-11 RX ADMIN — DOCUSATE SODIUM 100 MG: 100 CAPSULE, LIQUID FILLED ORAL at 08:11

## 2018-01-11 RX ADMIN — HYDRALAZINE HYDROCHLORIDE 50 MG: 50 TABLET, FILM COATED ORAL at 08:10

## 2018-01-11 RX ADMIN — Medication 10 ML: at 05:42

## 2018-01-11 RX ADMIN — BACLOFEN 10 MG: 10 TABLET ORAL at 08:11

## 2018-01-11 RX ADMIN — Medication 1 AMPULE: at 08:11

## 2018-01-11 RX ADMIN — LEVOTHYROXINE SODIUM 100 MCG: 100 TABLET ORAL at 05:42

## 2018-01-11 RX ADMIN — DICLOFENAC SODIUM 4 G: 10 GEL TOPICAL at 09:00

## 2018-01-11 RX ADMIN — FERROUS SULFATE TAB 325 MG (65 MG ELEMENTAL FE) 325 MG: 325 (65 FE) TAB at 08:11

## 2018-01-11 RX ADMIN — FINASTERIDE 5 MG: 5 TABLET, FILM COATED ORAL at 08:10

## 2018-01-11 NOTE — PROGRESS NOTES
Patient to transfer to [facility] @ 1300, transport scheduled via [facility]. Care Management Interventions  PCP Verified by CM:  Yes  Mode of Transport at Discharge: BLS  Transition of Care Consult (CM Consult): Discharge Planning  Physical Therapy Consult: Yes  Current Support Network: Assisted Living  Plan discussed with Pt/Family/Caregiver: Yes  Freedom of Choice Offered: Yes  Discharge Location  Discharge Placement: Skilled nursing facility

## 2018-01-11 NOTE — DISCHARGE SUMMARY
Physician Discharge Summary       Patient: Gabriel Castellano MRN: [id number]  SSN: [id number]    YOB: 1931  Age: 80 y.o. Sex: male    PCP: PROVIDER UNKNOWN    Admit date: 1/5/2018  Admitting Provider: William Aly MD    Discharge date: 1/11/2018  Discharging Provider: Brenda Kim MD    * Admission Diagnoses: Acute UTI (urinary tract infection)  Confusion  UTI (urinary tract infection)  Confusion    * Discharge Diagnoses:    Hospital Problems as of 1/11/2018  Never Reviewed          Codes Class Noted - Resolved POA    Acute metabolic encephalopathy ZVD-15-QT: G93.41  ICD-9-CM: 348.31  1/11/2018 - Present Yes        UTI (urinary tract infection) ICD-10-CM: N39.0  ICD-9-CM: 599.0  1/9/2018 - Present Unknown        Confusion ICD-10-CM: R41.0  ICD-9-CM: 298.9  1/5/2018 - Present Yes        * (Principal)Acute UTI (urinary tract infection) ICD-10-CM: N39.0  ICD-9-CM: 599.0  1/5/2018 - Present Yes        PAF (paroxysmal atrial fibrillation) (HCC) ICD-10-CM: I48.0  ICD-9-CM: 427.31  8/22/2017 - Present Yes        Abdominal aortic aneurysm (AAA) without rupture [facility]) ICD-10-CM: I71.4  ICD-9-CM: 441.4  8/18/2017 - Present Yes              * Hospital Course:     Mr. Julieth Fernando is an 81 yo white male, with a pmh of chronic CAD and chronic paroxysmal atrial fibrillation, who presented 1/5/17 for confusion and near syncopal event and was found to have acute metabolic encephalopathy due to a UTI. He was started on iv rocephin and has been changed to oral vantin to complete a ten day antibiotic course for a klebsiella pneumoniae UTI. He is feeling improved from admission but is weak and has been accepted to [facility] for continued therapies. He is medically stable for discharge.      Significant Diagnostic Studies:    NONCONTRAST HEAD CT     CLINICAL HISTORY:  HEAD injury from syncopal fall today.     COMPARISON:  June 12, 2017.     REPORT:   Standard non contrast head CT demonstrates no definite intracranial  mass effect, hemorrhage, or evidence of acute geographic infarction. Extensive  white matter hypodensities are most consistent with small vessel ischemic  disease. The ventricles are normal in size and configuration, accounting for  the patient's age. Orbits and  are clear where imaged. There is soft tissue  opacification of multiple ethmoid air cells bilaterally with moderate mucous  membrane thickening in the right maxillary sinus. Bone windows demonstrate no  definite fracture or destruction.     IMPRESSION  IMPRESSION:          1.  RIGHT MAXILLARY AND ETHMOID SINUSITIS.     2. EXTENSIVE SMALL VESSEL ISCHEMIC DISEASE WITH NO ACUTE INTRACRANIAL  ABNORMALITY OR CALVARIAL FRACTURE IDENTIFIED AT NONCONTRAST CT. Discharge Exam:    General: awake, alert, oriented, no acute distress  Eyes; non icteric, EOMI  ENT: poor dentition, no thrush  CV: RRR  Pulm; CTAB  Abd; soft, non tender, active BS, non distended    * Discharge Condition: stable  * Disposition: [place] ([facility])    Discharge Medications:  Current Discharge Medication List      START taking these medications    Details   cefpodoxime (VANTIN) 200 mg tablet Take 1 Tab by mouth every twenty-four (24) hours for 4 days. Qty: 4 Tab, Refills: 0         CONTINUE these medications which have NOT CHANGED    Details   metoprolol tartrate (LOPRESSOR) 25 mg tablet Take 1 Tab by mouth every twelve (12) hours. Qty: 60 Tab, Refills: 2      hydrALAZINE (APRESOLINE) 50 mg tablet Take 1 Tab by mouth three (3) times daily. Qty: 90 Tab, Refills: 2      aspirin delayed-release 81 mg tablet Take 81 mg by mouth daily. Indications: myocardial infarction prevention, Myocardial Reinfarction Prevention      baclofen (LIORESAL) 10 mg tablet Take 10 mg by mouth two (2) times a day. ferrous sulfate 325 mg (65 mg iron) tablet Take 325 mg by mouth Daily (before breakfast). omeprazole (PRILOSEC) 20 mg capsule Take 20 mg by mouth daily. docusate sodium (COLACE) 100 mg capsule Take 100 mg by mouth two (2) times a day. levothyroxine (SYNTHROID) 100 mcg tablet Take 100 mcg by mouth Daily (before breakfast). finasteride (PROSCAR) 5 mg tablet Take 5 mg by mouth daily. nicotine (NICODERM CQ) 7 mg/24 hr 1 Patch by TransDERmal route every twenty-four (24) hours. diclofenac (VOLTAREN) 1 % gel Apply  to affected area four (4) times daily. * Follow-up Care/Patient Instructions: Activity: as directed by PT  Diet: cardiac    Follow-up Information     Follow up With Details Comments Paresh Sharif) [address] [id number] [facility]  [phone]      Provider Unknown   Patient not available to ask          35 minutes spent in discharge planning and coordination of care.      Signed:  Catarina Conrad MD  1/11/2018  10:38 AM

## 2022-08-08 NOTE — PROGRESS NOTES
End of shift note:     All hourly rounds were completed on pt throughout shift. Pt was cooperative and med compliant. All pt needs are met at this time. Will continue to monitor pt throughout shift and give report to oncoming day shift nurse. normal (ped)...

## 2024-11-25 NOTE — PROGRESS NOTES
OT note: Attempted to see pt for treatment this afternoon. Pt stated that he did not want to participate in further therapy today and he worked with PT earlier. Will re-attempt at a later date/time.   Philomena Malik show

## (undated) DEVICE — AGENT HEMSTAT W4XL4IN OXIDIZED REGENERATED CELOS ABSRB SFT

## (undated) DEVICE — ABSORBENT, WATERPROOF, BACTERIA PROOF FILM DRESSING: Brand: OPSITE POST OP 9.5X8.5CM CTN 20

## (undated) DEVICE — Device

## (undated) DEVICE — CARDINAL HEALTH FLEXIBLE LIGHT HANDLE COVER: Brand: CARDINAL HEALTH

## (undated) DEVICE — 3M™ BAIR HUGGER® UNDERBODY BLANKET, ADULT, 10 PER CASE 54500: Brand: BAIR HUGGER™

## (undated) DEVICE — INTENDED FOR TISSUE SEPARATION, AND OTHER PROCEDURES THAT REQUIRE A SHARP SURGICAL BLADE TO PUNCTURE OR CUT.: Brand: BARD-PARKER SAFETY BLADES SIZE 10, STERILE

## (undated) DEVICE — RADIFOCUS TORQUE DEVICE MULTI-TORQUE VISE: Brand: RADIFOCUS TORQUE DEVICE

## (undated) DEVICE — SHEATH INTRO 18FR L33CM OD6.7MM ID6MM HYDRPHLC KINK

## (undated) DEVICE — SURGICAL PROCEDURE PACK SPEC

## (undated) DEVICE — INTRODUCER SHTH 11FR CANN L23CM DIL TIP 45MM YEL W/O MINI

## (undated) DEVICE — CLAMP INSERT: Brand: STEALTH® FIBRA® CLAMP INSERT

## (undated) DEVICE — HI-TORQUE VERSACORE FLOPPY GUIDE WIRE SYSTEM 175 CM: Brand: HI-TORQUE VERSACORE

## (undated) DEVICE — SUTURE PERMAHAND SZ 2-0 L12X18IN NONABSORBABLE BLK SILK A185H

## (undated) DEVICE — GEL US 20GM NONIRRITATING OVERWRAPPED FILE PCH TRNSMIT

## (undated) DEVICE — DRAPE XR C ARM 41X74IN LF --

## (undated) DEVICE — SUTURE PROL SZ 5-0 L24IN NONABSORBABLE BLU L9.3MM BV-1 3/8 9702H

## (undated) DEVICE — SLIM BODY SKIN STAPLER: Brand: APPOSE ULC

## (undated) DEVICE — 2000CC GUARDIAN II: Brand: GUARDIAN

## (undated) DEVICE — SHTH INTRO FLEX 12FR 33CM -- DRYSEAL

## (undated) DEVICE — MEDI-VAC NON-CONDUCTIVE SUCTION TUBING: Brand: CARDINAL HEALTH

## (undated) DEVICE — ULNAR NERVE PROTECTOR,CONVOLUTED: Brand: DEVON

## (undated) DEVICE — INTENDED TO BE USED TO OCCLUDE, RETRACT AND IDENTIFY ARTERIES, VEINS, TENDONS AND NERVES IN SURGICAL PROCEDURES: Brand: STERION®  VESSEL LOOP

## (undated) DEVICE — SPONGE LAP 18X18IN STRL -- 5/PK

## (undated) DEVICE — SUTURE PROL SZ 7-0 L24IN NONABSORBABLE BLU L9.3MM BV-1 3/8 M8702

## (undated) DEVICE — INTENDED FOR TISSUE SEPARATION, AND OTHER PROCEDURES THAT REQUIRE A SHARP SURGICAL BLADE TO PUNCTURE OR CUT.: Brand: BARD-PARKER SAFETY BLADES SIZE 11, STERILE

## (undated) DEVICE — CATHETER VASC AD 5FR L65CM 0.038 DIAG KA 2 PERIPH W/O

## (undated) DEVICE — WIPE INSTR HIGH ABSORBENT FAST WICKING LINT FREE COUNT 20

## (undated) DEVICE — INTRODUCER SHTH 6FR CANN L11CM DIL TIP 35MM GRN TUNGSTEN

## (undated) DEVICE — PAD,NON-ADHERENT,3X8,STERILE,LF,1/PK: Brand: MEDLINE

## (undated) DEVICE — GUIDEWIRE VASC L180CM DIA0.035IN TIP L7CM PTFE S STL STR

## (undated) DEVICE — TUBING CNTRST INJ HI PRESSURE 108 IN

## (undated) DEVICE — PERCUTANEOUS ENTRY THINWALL NEEDLE  ONE-PART: Brand: COOK

## (undated) DEVICE — SOLOPATH BALLOON EXPANDABLE TRANSFEMORAL SYSTEM: Brand: SOLOPATH

## (undated) DEVICE — INTENDED FOR TISSUE SEPARATION, AND OTHER PROCEDURES THAT REQUIRE A SHARP SURGICAL BLADE TO PUNCTURE OR CUT.: Brand: BARD-PARKER SAFETY BLADES SIZE 15, STERILE

## (undated) DEVICE — SUTURE VCRL SZ 2-0 L36IN ABSRB UD L36MM CT-1 1/2 CIR J945H

## (undated) DEVICE — SYR LR LCK 1ML GRAD NSAF 30ML --

## (undated) DEVICE — RADIFOCUS GLIDEWIRE: Brand: GLIDEWIRE

## (undated) DEVICE — GOWN,REINFORCED,POLY,AURORA,XXLARGE,STR: Brand: MEDLINE

## (undated) DEVICE — INFLATION DEVICE: Brand: ENCORE™ 26

## (undated) DEVICE — CATHETER ANGIO 5FR L80CM 0.035IN SOS OMNI 2 VIS SEL SFT

## (undated) DEVICE — APPLIER CLP SM BLK TI AUTO HNDL DISP W/ 20 CLP PREM SURGICLP

## (undated) DEVICE — CONQUEST® PTA BALLOON DILATATION CATHETER 8 MM X 40 MM, 75 CM CATHETER: Brand: CONQUEST®

## (undated) DEVICE — 3M™ IOBAN™ 2 ANTIMICROBIAL INCISE DRAPE 6651EZ: Brand: IOBAN™ 2

## (undated) DEVICE — APPLIER CLP AUTO MED 9.75 IN TI SURGCLP SUPER INTLOK 20 DISP

## (undated) DEVICE — CATHETER ANGIO 5FR L70CM 0.038IN SEG 20CM PGTL FLSH 20 1

## (undated) DEVICE — SUTURE PERMAHAND SZ 3-0 L18IN NONABSORBABLE BLK SILK BRAID A184H

## (undated) DEVICE — SUTURE PROL SZ 5-0 L24IN NONABSORBABLE BLU L13MM C-1 3/8 M8725

## (undated) DEVICE — GOWN,PREVENTION PLUS,2XL,ST,22/CS: Brand: MEDLINE

## (undated) DEVICE — SUTURE VCRL SZ 3-0 L27IN ABSRB UD L26MM SH 1/2 CIR J416H

## (undated) DEVICE — SUTURE VCRL SZ 4-0 L27IN ABSRB UD L19MM PS-2 3/8 CIR PRIM J426H

## (undated) DEVICE — SYRINGE MED 20ML WHT PLUNG CLR POLYCARB BRL FIX M LUER CONN

## (undated) DEVICE — CONQUEST® PTA BALLOON DILATATION CATHETER 6 MM X 40 MM, 75 CM CATHETER: Brand: CONQUEST®

## (undated) DEVICE — REM POLYHESIVE ADULT PATIENT RETURN ELECTRODE: Brand: VALLEYLAB

## (undated) DEVICE — DRAPE SURG SPEC PROC 4 PC

## (undated) DEVICE — SUTURE PROL SZ 6-0 L24IN NONABSORBABLE BLU BV-1 L9.3MM 3/8 M8805

## (undated) DEVICE — BUTTON SWITCH PENCIL BLADE ELECTRODE, HOLSTER: Brand: EDGE

## (undated) DEVICE — STENT GRAFT BLLN CATH 65CM --

## (undated) DEVICE — SYRINGE ANGIO CNTRST DEL 20 CC POLYCARB DK GRN MEDALLION

## (undated) DEVICE — STERILE SLEEVE: Brand: CONVERTORS

## (undated) DEVICE — SUTURE PROL SZ 6-0 L24IN NONABSORBABLE BLU L13MM C-1 3/8 8726H

## (undated) DEVICE — DRAPE TWL SURG 16X26IN BLU ORB04] ALLCARE INC]

## (undated) DEVICE — BENTSON WIRE GUIDE 20CM DISTAL FLEXIBILITY WITH SOFTENED TIP: Brand: BENTSON

## (undated) DEVICE — DISH PTRI STRL --

## (undated) DEVICE — BASIC SINGLE BASIN-LF: Brand: MEDLINE INDUSTRIES, INC.